# Patient Record
Sex: FEMALE | Race: WHITE | NOT HISPANIC OR LATINO | URBAN - NONMETROPOLITAN AREA
[De-identification: names, ages, dates, MRNs, and addresses within clinical notes are randomized per-mention and may not be internally consistent; named-entity substitution may affect disease eponyms.]

---

## 2017-04-18 ENCOUNTER — OUTSIDE FACILITY SERVICE (OUTPATIENT)
Dept: FAMILY MEDICINE CLINIC | Facility: CLINIC | Age: 82
End: 2017-04-18

## 2017-04-18 PROCEDURE — 99308 SBSQ NF CARE LOW MDM 20: CPT | Performed by: FAMILY MEDICINE

## 2017-04-25 ENCOUNTER — OUTSIDE FACILITY SERVICE (OUTPATIENT)
Dept: FAMILY MEDICINE CLINIC | Facility: CLINIC | Age: 82
End: 2017-04-25

## 2017-04-25 PROCEDURE — 99308 SBSQ NF CARE LOW MDM 20: CPT | Performed by: FAMILY MEDICINE

## 2017-05-02 ENCOUNTER — OUTSIDE FACILITY SERVICE (OUTPATIENT)
Dept: FAMILY MEDICINE CLINIC | Facility: CLINIC | Age: 82
End: 2017-05-02

## 2017-05-02 PROCEDURE — 99309 SBSQ NF CARE MODERATE MDM 30: CPT | Performed by: FAMILY MEDICINE

## 2017-05-09 ENCOUNTER — OUTSIDE FACILITY SERVICE (OUTPATIENT)
Dept: FAMILY MEDICINE CLINIC | Facility: CLINIC | Age: 82
End: 2017-05-09

## 2017-05-09 PROCEDURE — 99308 SBSQ NF CARE LOW MDM 20: CPT | Performed by: FAMILY MEDICINE

## 2017-05-17 ENCOUNTER — OUTSIDE FACILITY SERVICE (OUTPATIENT)
Dept: FAMILY MEDICINE CLINIC | Facility: CLINIC | Age: 82
End: 2017-05-17

## 2017-05-17 PROCEDURE — 99308 SBSQ NF CARE LOW MDM 20: CPT | Performed by: FAMILY MEDICINE

## 2017-05-24 ENCOUNTER — OUTSIDE FACILITY SERVICE (OUTPATIENT)
Dept: FAMILY MEDICINE CLINIC | Facility: CLINIC | Age: 82
End: 2017-05-24

## 2017-05-24 PROCEDURE — 99308 SBSQ NF CARE LOW MDM 20: CPT | Performed by: FAMILY MEDICINE

## 2017-06-06 ENCOUNTER — OUTSIDE FACILITY SERVICE (OUTPATIENT)
Dept: FAMILY MEDICINE CLINIC | Facility: CLINIC | Age: 82
End: 2017-06-06

## 2017-06-06 PROCEDURE — 99308 SBSQ NF CARE LOW MDM 20: CPT | Performed by: FAMILY MEDICINE

## 2017-06-13 ENCOUNTER — OUTSIDE FACILITY SERVICE (OUTPATIENT)
Dept: FAMILY MEDICINE CLINIC | Facility: CLINIC | Age: 82
End: 2017-06-13

## 2017-06-13 PROCEDURE — 99308 SBSQ NF CARE LOW MDM 20: CPT | Performed by: FAMILY MEDICINE

## 2017-06-21 ENCOUNTER — OUTSIDE FACILITY SERVICE (OUTPATIENT)
Dept: FAMILY MEDICINE CLINIC | Facility: CLINIC | Age: 82
End: 2017-06-21

## 2017-06-21 PROCEDURE — 99308 SBSQ NF CARE LOW MDM 20: CPT | Performed by: FAMILY MEDICINE

## 2017-06-28 ENCOUNTER — OUTSIDE FACILITY SERVICE (OUTPATIENT)
Dept: FAMILY MEDICINE CLINIC | Facility: CLINIC | Age: 82
End: 2017-06-28

## 2017-06-28 PROCEDURE — 99308 SBSQ NF CARE LOW MDM 20: CPT | Performed by: FAMILY MEDICINE

## 2017-07-05 ENCOUNTER — OUTSIDE FACILITY SERVICE (OUTPATIENT)
Dept: FAMILY MEDICINE CLINIC | Facility: CLINIC | Age: 82
End: 2017-07-05

## 2017-07-05 PROCEDURE — 99308 SBSQ NF CARE LOW MDM 20: CPT | Performed by: FAMILY MEDICINE

## 2017-07-28 ENCOUNTER — OUTSIDE FACILITY SERVICE (OUTPATIENT)
Dept: FAMILY MEDICINE CLINIC | Facility: CLINIC | Age: 82
End: 2017-07-28

## 2017-07-28 PROCEDURE — 99308 SBSQ NF CARE LOW MDM 20: CPT | Performed by: NURSE PRACTITIONER

## 2017-08-08 ENCOUNTER — OUTSIDE FACILITY SERVICE (OUTPATIENT)
Dept: FAMILY MEDICINE CLINIC | Facility: CLINIC | Age: 82
End: 2017-08-08

## 2017-08-08 PROCEDURE — 99309 SBSQ NF CARE MODERATE MDM 30: CPT | Performed by: FAMILY MEDICINE

## 2017-08-17 ENCOUNTER — OUTSIDE FACILITY SERVICE (OUTPATIENT)
Dept: FAMILY MEDICINE CLINIC | Facility: CLINIC | Age: 82
End: 2017-08-17

## 2017-08-17 PROCEDURE — 99308 SBSQ NF CARE LOW MDM 20: CPT | Performed by: NURSE PRACTITIONER

## 2017-09-08 ENCOUNTER — OUTSIDE FACILITY SERVICE (OUTPATIENT)
Dept: FAMILY MEDICINE CLINIC | Facility: CLINIC | Age: 82
End: 2017-09-08

## 2017-09-08 PROCEDURE — 99308 SBSQ NF CARE LOW MDM 20: CPT | Performed by: NURSE PRACTITIONER

## 2017-09-22 ENCOUNTER — OUTSIDE FACILITY SERVICE (OUTPATIENT)
Dept: FAMILY MEDICINE CLINIC | Facility: CLINIC | Age: 82
End: 2017-09-22

## 2017-09-22 PROCEDURE — 99308 SBSQ NF CARE LOW MDM 20: CPT | Performed by: NURSE PRACTITIONER

## 2017-09-27 ENCOUNTER — OUTSIDE FACILITY SERVICE (OUTPATIENT)
Dept: FAMILY MEDICINE CLINIC | Facility: CLINIC | Age: 82
End: 2017-09-27

## 2017-09-27 PROCEDURE — 99309 SBSQ NF CARE MODERATE MDM 30: CPT | Performed by: FAMILY MEDICINE

## 2017-10-10 ENCOUNTER — OUTSIDE FACILITY SERVICE (OUTPATIENT)
Dept: FAMILY MEDICINE CLINIC | Facility: CLINIC | Age: 82
End: 2017-10-10

## 2017-10-10 PROCEDURE — 99309 SBSQ NF CARE MODERATE MDM 30: CPT | Performed by: FAMILY MEDICINE

## 2017-10-13 ENCOUNTER — OUTSIDE FACILITY SERVICE (OUTPATIENT)
Dept: FAMILY MEDICINE CLINIC | Facility: CLINIC | Age: 82
End: 2017-10-13

## 2017-10-13 PROCEDURE — 99308 SBSQ NF CARE LOW MDM 20: CPT | Performed by: NURSE PRACTITIONER

## 2017-10-24 ENCOUNTER — OUTSIDE FACILITY SERVICE (OUTPATIENT)
Dept: FAMILY MEDICINE CLINIC | Facility: CLINIC | Age: 82
End: 2017-10-24

## 2017-10-24 PROCEDURE — 99309 SBSQ NF CARE MODERATE MDM 30: CPT | Performed by: FAMILY MEDICINE

## 2017-11-03 ENCOUNTER — OUTSIDE FACILITY SERVICE (OUTPATIENT)
Dept: FAMILY MEDICINE CLINIC | Facility: CLINIC | Age: 82
End: 2017-11-03

## 2017-11-03 PROCEDURE — 99308 SBSQ NF CARE LOW MDM 20: CPT | Performed by: NURSE PRACTITIONER

## 2017-11-07 ENCOUNTER — OUTSIDE FACILITY SERVICE (OUTPATIENT)
Dept: FAMILY MEDICINE CLINIC | Facility: CLINIC | Age: 82
End: 2017-11-07

## 2017-11-07 PROCEDURE — 99309 SBSQ NF CARE MODERATE MDM 30: CPT | Performed by: FAMILY MEDICINE

## 2017-11-20 ENCOUNTER — OUTSIDE FACILITY SERVICE (OUTPATIENT)
Dept: FAMILY MEDICINE CLINIC | Facility: CLINIC | Age: 82
End: 2017-11-20

## 2017-11-20 PROCEDURE — 99309 SBSQ NF CARE MODERATE MDM 30: CPT | Performed by: FAMILY MEDICINE

## 2017-12-05 ENCOUNTER — OUTSIDE FACILITY SERVICE (OUTPATIENT)
Dept: FAMILY MEDICINE CLINIC | Facility: CLINIC | Age: 82
End: 2017-12-05

## 2017-12-05 PROCEDURE — 99309 SBSQ NF CARE MODERATE MDM 30: CPT | Performed by: FAMILY MEDICINE

## 2017-12-15 ENCOUNTER — OUTSIDE FACILITY SERVICE (OUTPATIENT)
Dept: FAMILY MEDICINE CLINIC | Facility: CLINIC | Age: 82
End: 2017-12-15

## 2017-12-15 PROCEDURE — 99308 SBSQ NF CARE LOW MDM 20: CPT | Performed by: NURSE PRACTITIONER

## 2017-12-19 ENCOUNTER — OUTSIDE FACILITY SERVICE (OUTPATIENT)
Dept: FAMILY MEDICINE CLINIC | Facility: CLINIC | Age: 82
End: 2017-12-19

## 2017-12-19 PROCEDURE — 99309 SBSQ NF CARE MODERATE MDM 30: CPT | Performed by: FAMILY MEDICINE

## 2018-01-04 ENCOUNTER — OUTSIDE FACILITY SERVICE (OUTPATIENT)
Dept: INTERNAL MEDICINE | Facility: CLINIC | Age: 83
End: 2018-01-04

## 2018-01-04 PROCEDURE — 99309 SBSQ NF CARE MODERATE MDM 30: CPT | Performed by: FAMILY MEDICINE

## 2018-01-12 ENCOUNTER — OUTSIDE FACILITY SERVICE (OUTPATIENT)
Dept: FAMILY MEDICINE CLINIC | Facility: CLINIC | Age: 83
End: 2018-01-12

## 2018-01-12 PROCEDURE — 99308 SBSQ NF CARE LOW MDM 20: CPT | Performed by: NURSE PRACTITIONER

## 2018-01-17 ENCOUNTER — OUTSIDE FACILITY SERVICE (OUTPATIENT)
Dept: INTERNAL MEDICINE | Facility: CLINIC | Age: 83
End: 2018-01-17

## 2018-01-17 PROCEDURE — 99309 SBSQ NF CARE MODERATE MDM 30: CPT | Performed by: FAMILY MEDICINE

## 2018-01-31 ENCOUNTER — OUTSIDE FACILITY SERVICE (OUTPATIENT)
Dept: INTERNAL MEDICINE | Facility: CLINIC | Age: 83
End: 2018-01-31

## 2018-01-31 PROCEDURE — 99309 SBSQ NF CARE MODERATE MDM 30: CPT | Performed by: FAMILY MEDICINE

## 2018-02-09 ENCOUNTER — OUTSIDE FACILITY SERVICE (OUTPATIENT)
Dept: FAMILY MEDICINE CLINIC | Facility: CLINIC | Age: 83
End: 2018-02-09

## 2018-02-09 PROCEDURE — 99308 SBSQ NF CARE LOW MDM 20: CPT | Performed by: NURSE PRACTITIONER

## 2018-02-14 ENCOUNTER — OUTSIDE FACILITY SERVICE (OUTPATIENT)
Dept: INTERNAL MEDICINE | Facility: CLINIC | Age: 83
End: 2018-02-14

## 2018-02-14 PROCEDURE — 99309 SBSQ NF CARE MODERATE MDM 30: CPT | Performed by: FAMILY MEDICINE

## 2018-02-27 ENCOUNTER — OUTSIDE FACILITY SERVICE (OUTPATIENT)
Dept: INTERNAL MEDICINE | Facility: CLINIC | Age: 83
End: 2018-02-27

## 2018-02-27 PROCEDURE — 99309 SBSQ NF CARE MODERATE MDM 30: CPT | Performed by: FAMILY MEDICINE

## 2018-03-23 ENCOUNTER — OUTSIDE FACILITY SERVICE (OUTPATIENT)
Dept: FAMILY MEDICINE CLINIC | Facility: CLINIC | Age: 83
End: 2018-03-23

## 2018-03-23 PROCEDURE — 99308 SBSQ NF CARE LOW MDM 20: CPT | Performed by: NURSE PRACTITIONER

## 2018-03-28 ENCOUNTER — OUTSIDE FACILITY SERVICE (OUTPATIENT)
Dept: INTERNAL MEDICINE | Facility: CLINIC | Age: 83
End: 2018-03-28

## 2018-03-28 PROCEDURE — 99309 SBSQ NF CARE MODERATE MDM 30: CPT | Performed by: FAMILY MEDICINE

## 2018-04-10 ENCOUNTER — OUTSIDE FACILITY SERVICE (OUTPATIENT)
Dept: INTERNAL MEDICINE | Facility: CLINIC | Age: 83
End: 2018-04-10

## 2018-04-10 PROCEDURE — 99309 SBSQ NF CARE MODERATE MDM 30: CPT | Performed by: FAMILY MEDICINE

## 2018-04-13 ENCOUNTER — OUTSIDE FACILITY SERVICE (OUTPATIENT)
Dept: FAMILY MEDICINE CLINIC | Facility: CLINIC | Age: 83
End: 2018-04-13

## 2018-04-13 PROCEDURE — 99308 SBSQ NF CARE LOW MDM 20: CPT | Performed by: NURSE PRACTITIONER

## 2018-04-18 ENCOUNTER — OUTSIDE FACILITY SERVICE (OUTPATIENT)
Dept: INTERNAL MEDICINE | Facility: CLINIC | Age: 83
End: 2018-04-18

## 2018-04-18 PROCEDURE — 99309 SBSQ NF CARE MODERATE MDM 30: CPT | Performed by: FAMILY MEDICINE

## 2018-04-24 ENCOUNTER — OUTSIDE FACILITY SERVICE (OUTPATIENT)
Dept: INTERNAL MEDICINE | Facility: CLINIC | Age: 83
End: 2018-04-24

## 2018-04-24 PROCEDURE — 99309 SBSQ NF CARE MODERATE MDM 30: CPT | Performed by: FAMILY MEDICINE

## 2018-05-18 ENCOUNTER — OUTSIDE FACILITY SERVICE (OUTPATIENT)
Dept: FAMILY MEDICINE CLINIC | Facility: CLINIC | Age: 83
End: 2018-05-18

## 2018-05-18 PROCEDURE — 99308 SBSQ NF CARE LOW MDM 20: CPT | Performed by: NURSE PRACTITIONER

## 2018-06-04 RX ORDER — METHYLPHENIDATE HYDROCHLORIDE 5 MG/1
5 TABLET ORAL DAILY
Qty: 60 TABLET | Refills: 0 | Status: SHIPPED | OUTPATIENT
Start: 2018-06-04 | End: 2018-08-06 | Stop reason: SDUPTHER

## 2018-06-29 ENCOUNTER — OUTSIDE FACILITY SERVICE (OUTPATIENT)
Dept: FAMILY MEDICINE CLINIC | Facility: CLINIC | Age: 83
End: 2018-06-29

## 2018-06-29 PROCEDURE — 99308 SBSQ NF CARE LOW MDM 20: CPT | Performed by: NURSE PRACTITIONER

## 2018-07-09 ENCOUNTER — NURSING HOME (OUTPATIENT)
Dept: FAMILY MEDICINE CLINIC | Facility: CLINIC | Age: 83
End: 2018-07-09

## 2018-07-09 VITALS
HEART RATE: 70 BPM | SYSTOLIC BLOOD PRESSURE: 136 MMHG | DIASTOLIC BLOOD PRESSURE: 68 MMHG | WEIGHT: 138 LBS | OXYGEN SATURATION: 96 % | RESPIRATION RATE: 16 BRPM | TEMPERATURE: 98.4 F

## 2018-07-09 DIAGNOSIS — K21.9 GERD WITHOUT ESOPHAGITIS: Chronic | ICD-10-CM

## 2018-07-09 DIAGNOSIS — I10 ESSENTIAL HYPERTENSION: Chronic | ICD-10-CM

## 2018-07-09 DIAGNOSIS — Z79.4 TYPE 2 DIABETES MELLITUS TREATED WITH INSULIN (HCC): Chronic | ICD-10-CM

## 2018-07-09 DIAGNOSIS — J96.11 CHRONIC RESPIRATORY FAILURE WITH HYPOXIA (HCC): Chronic | ICD-10-CM

## 2018-07-09 DIAGNOSIS — Z78.9 IMPAIRED MOBILITY AND ADLS: Chronic | ICD-10-CM

## 2018-07-09 DIAGNOSIS — E11.9 TYPE 2 DIABETES MELLITUS TREATED WITH INSULIN (HCC): Chronic | ICD-10-CM

## 2018-07-09 DIAGNOSIS — F01.50 VASCULAR DEMENTIA WITHOUT BEHAVIORAL DISTURBANCE (HCC): Chronic | ICD-10-CM

## 2018-07-09 DIAGNOSIS — Z74.09 IMPAIRED MOBILITY AND ADLS: Chronic | ICD-10-CM

## 2018-07-09 DIAGNOSIS — R54 AGE-RELATED PHYSICAL DEBILITY: Primary | Chronic | ICD-10-CM

## 2018-07-09 PROCEDURE — 99309 SBSQ NF CARE MODERATE MDM 30: CPT | Performed by: FAMILY MEDICINE

## 2018-07-10 PROBLEM — I10 ESSENTIAL HYPERTENSION: Chronic | Status: ACTIVE | Noted: 2018-07-10

## 2018-07-10 PROBLEM — J96.11 CHRONIC RESPIRATORY FAILURE WITH HYPOXIA (HCC): Chronic | Status: ACTIVE | Noted: 2018-07-10

## 2018-07-10 PROBLEM — K21.9 GERD WITHOUT ESOPHAGITIS: Chronic | Status: ACTIVE | Noted: 2018-07-10

## 2018-07-10 PROBLEM — E11.9 TYPE 2 DIABETES MELLITUS TREATED WITH INSULIN (HCC): Chronic | Status: ACTIVE | Noted: 2018-07-10

## 2018-07-10 PROBLEM — Z78.9 IMPAIRED MOBILITY AND ADLS: Chronic | Status: ACTIVE | Noted: 2018-07-10

## 2018-07-10 PROBLEM — Z79.4 TYPE 2 DIABETES MELLITUS TREATED WITH INSULIN (HCC): Chronic | Status: ACTIVE | Noted: 2018-07-10

## 2018-07-10 PROBLEM — R54 AGE-RELATED PHYSICAL DEBILITY: Chronic | Status: ACTIVE | Noted: 2018-07-10

## 2018-07-10 PROBLEM — Z74.09 IMPAIRED MOBILITY AND ADLS: Chronic | Status: ACTIVE | Noted: 2018-07-10

## 2018-07-10 PROBLEM — F01.50 VASCULAR DEMENTIA WITHOUT BEHAVIORAL DISTURBANCE (HCC): Chronic | Status: ACTIVE | Noted: 2018-07-10

## 2018-07-10 NOTE — PROGRESS NOTES
[facility]  Skilled Nursing Facility    Patient Name: Brigette Ramirez    : 10/26/1930     DOS: 2018    Nursing Facility: []   [place]  []     []   [facility]  []   [facility]    Subjective     Chief Complaint:  CMM/LTC    History of Present Illness:   [x]  Follow Up visit for coordination of long term care issues and chronic medical management needs.    Dementia/debility/chronic respiratory failure with hypoxia/impaired mobility and ADLs/diabetes mellitus/GERD      Review of Systems:  [x]  A complete ROS was performed. All were (-).  []  A complete ROS was performed. All were (-) except:     Review of Systems    1. Constitutional:  2. HENT:  3. Eyes:  4. Respiratory:  5. Cardiovascular:  6. Gastrointestinal:  7. Endocrine:  8. Genitourinary:  9. Musculoskeletal:  10. Skin:  11. Neurological:  12. Hematological:  13. Psychiatric/Behavioral:    Past Medical History: No past medical history on file. Reviewed at facility    Past Surgical History:No past surgical history on file. Reviewed at facility    Family History: family history is not on file. Reviewed at facility    Social History:   Reviewed at facility    Allergies:  Allergies not on file Reviewed at facility, no new listings     [x]  History reviewed at skilled nursing facility.    Objective     Vital Signs:                                              LPM: ____  Weight: _____    Physical Exam:  General:   [x]  Alert   [x]  Oriented x person  [x]  No acute distress    [x]  Confused  []  Disoriented   []  Comatose      HEENT:   [x] Atraumatic  [x]  PEERLA  [x]  Oral mucosa pink and moist                Neck:      [x]  Nares patent without epistaxis  [x]  External auditory canals are patent     [x]  Tympanic membranes intact      [x]  Supple [x]  No JVD [x]  Trachea midline [x]  No thyromegaly      Psych:  [x]  Mood _________  [x]  No acute changes []  Depressed     Heart::   [x]  RRR  []  IRRR  []  Murmur ___________     Pedal Pulses:     [x]  Present  []  Absent      Lungs:   [x]   CTA  [x]  Unlabored breathing effort []  Rales, Location ___________       Abdomen:    [x]   Soft, BS x 4, NT/ND  []  Distended  []  Tender __________       Skin:  [x]  Intact, warm and dry  []  Pressure Ulcer, Location  Age related atrophy of the skin     Extremities:               Neuro:     [x]  No clubbing [x]  No cyanosis  [x]  No edema    [x]  Good ROM actively and passively [x] Symmetric muscle strength and                                                                 development     []  Edema, Location []  Pitting    [x]  Normal speech []  Normal mental status [x] Cranial nerves II through                                                                                XII intact   [x]  No anosmia [x]  DTR 2+ [x]  Proprioception intact    [x]  No focal motor/sensory deficits  Poor core strength and stability       Pain:  []  None  [x]  Pain noted w/pain management in place       Urinary:                []  Continent  [x]  Incontinent  []  Retention  []  F/C     []  UTI w/treatment in progress              Appetite:  []  Fair  [x]  Good  []  Poor  [x]  Weight Stable     []  Weightloss  []  Unavoidable Weight Loss     []  Supplements Provided  []  Tolerating Tube Feeding              Assessment / Plan     Assessment/Problem List:    Patient Active Problem List   Diagnosis   • Vascular dementia without behavioral disturbance   • Impaired mobility and ADLs   • Age-related physical debility   • Type 2 diabetes mellitus treated with insulin (CMS/[facility])   • Essential hypertension   • GERD without esophagitis   • Chronic respiratory failure with hypoxia (CMS/[facility])       Plan:  Planned continuation of supportive care.  No significant increased work of breathing or increased oxygen demand.  Blood sugars have been adequately controlled.  Vital signs otherwise stable.  No acute behavioral changes.  Blood pressure is at goal.      [x] Medication Review: I have reviewed the  patients active and prn medications at Skilled Nursing Facility      []   PT/OT Reviewed   []  Order Changes     [x] I have personally reviewed and interpreted available lab data, radiology studies and ECG obtained at time of visit.       []   Discharge Plans Reviewed      [x]  Plan of Care Reviewed  []  Discharge Summary Reviewed      [x]  Discussed Patient in detail with nursing/staff, addressed all needs today.    Discussed plan of care in detail with pt today; pt verb understanding and agrees; counseled for approx 15 min of total 25 min total exam time.    Jose D Pennington,  07/10/18 5:16 PM    EMR Dragon/Transcription disclaimer:   Much of this encounter note is an electronic transcription/translation of spoken language to printed text. The electronic translation of spoken language may permit erroneous, or at times, nonsensical words or phrases to be inadvertently transcribed; Although I have reviewed the note for such errors, some may still exist.

## 2018-07-17 ENCOUNTER — NURSING HOME (OUTPATIENT)
Dept: FAMILY MEDICINE CLINIC | Facility: CLINIC | Age: 83
End: 2018-07-17

## 2018-07-17 DIAGNOSIS — J96.11 CHRONIC RESPIRATORY FAILURE WITH HYPOXIA (HCC): Chronic | ICD-10-CM

## 2018-07-17 DIAGNOSIS — R54 AGE-RELATED PHYSICAL DEBILITY: Chronic | ICD-10-CM

## 2018-07-17 DIAGNOSIS — Z74.09 IMPAIRED MOBILITY AND ADLS: Primary | Chronic | ICD-10-CM

## 2018-07-17 DIAGNOSIS — F01.50 VASCULAR DEMENTIA WITHOUT BEHAVIORAL DISTURBANCE (HCC): Chronic | ICD-10-CM

## 2018-07-17 DIAGNOSIS — Z78.9 IMPAIRED MOBILITY AND ADLS: Primary | Chronic | ICD-10-CM

## 2018-07-17 DIAGNOSIS — K21.9 GERD WITHOUT ESOPHAGITIS: Chronic | ICD-10-CM

## 2018-07-17 DIAGNOSIS — Z79.4 TYPE 2 DIABETES MELLITUS TREATED WITH INSULIN (HCC): Chronic | ICD-10-CM

## 2018-07-17 DIAGNOSIS — E11.9 TYPE 2 DIABETES MELLITUS TREATED WITH INSULIN (HCC): Chronic | ICD-10-CM

## 2018-07-17 DIAGNOSIS — I10 ESSENTIAL HYPERTENSION: Chronic | ICD-10-CM

## 2018-07-17 PROCEDURE — 99309 SBSQ NF CARE MODERATE MDM 30: CPT | Performed by: FAMILY MEDICINE

## 2018-07-19 NOTE — PROGRESS NOTES
Nursing Home Progress Note      Patient Name: Brigette Ramirez   YOB: 1930    Date of Service: 07/17/2018      Facility: [place] []   [place] []   [facility] [x]   [facility] []   Other []       CHIEF COMPLAINT:  CMM/LTC     HISTORY OF PRESENT ILLNESS:  [x]  Follow Up visit for coordination of long term care issues and chronic medical management needs.    Vascular dementia/diabetes mellitus/GERD/chronic respiratory failure with hypoxia/impaired mobility and ADLs/ ypertension/age related physcal ebility    PAST MEDICAL & SURGICAL HISTORY:  No past medical history on file.   No past surgical history on file.     MEDICATIONS:  Medication administration record for Brigette Ramirez reviewed and reconciled today.    ALLERGIES:  Allergies not on file     REVIEW OF SYSTEMS:  • Appetite: Fair []   Good [x]   Poor []   Weight Loss []  [x]  Weight Stable   Unavoidable Weight Loss []  Tolerating Tube Feeding []    Supplements Provided []   • Constitutional: Negative for fever, chills, diaphoresis or fatigue and weight change.   • HENT: No dysphagia; no new changes to vision/hearing/smell/taste; no epistaxis  • Eyes: Negative for redness and visual disturbance.   • Respiratory: Negative for shortness of breath, chest pain, cough or chest tightness.   • Cardiovascular: Negative for chest pain and palpitations.   • Gastrointestinal: Negative for abdominal distention, abdominal pain and blood in stool.   • Endocrine: Negative for cold intolerance and heat intolerance.   • Genitourinary: Negative for difficulty urinating, dysuria and frequency.Negative for hematuria   • Musculoskeletal: Chronic myalgias and arthralgias  • Integumentary: No open wounds, rash or concerning skin lesions  • Neurological: Negative for syncope, weakness and headaches.   • Hematological: Negative for adenopathy. Does not bruise/bleed easily.   • Immunological: Negative for reported allergies or immunological disorders  • Psychological: No depression,  anxiety or suicidal ideation    PHYSICAL EXAMINATION:  VITAL SIGNS: /70, HR 70 bpm, RR 18, weight 138, O2 sat 96% on 3 L NC    General Appearance:  [x]  Alert   [x]  Oriented x person  [x]  No acute distress    [x]  Confused  []  Disoriented   []  Comatose   Head:  Atraumatic and normocephalic, without obvious abnormality.   Eyes:          PERRLA, conjunctivae and sclerae normal, no Icterus. No pallor. Extra-occular movements are within normal limits.   Ears:  Ears appear intact with no abnormalities noted.   Throat: No oral lesions, no thrush, oral mucosa moist.   Neck: Supple, trachea midline, no thyromegaly, no carotid bruit.   Back:   No kyphoscoliosis. No tenderness to palpation.   Lungs:   Chest shape is normal.  Audible air exchange noted all lung fields.  No wheezing.  Rhonchus, referred upper congestion that is cleared with light cough.     Heart:  Normal S1 and S2, no murmur, no gallop, no rub. No JVD.   Abdomen:   Normal bowel sounds, no masses, no organomegaly. Soft, non-tender, non-distended, no guarding    Extremities: Chronic frail stature, edema, cyanosis or clubbing.  Frail build.    Pulses: Pulses palpable and equal bilaterally.   Skin: No bleeding or rash.  Generalized dry skin noted.  Age-related atrophy of skin.   Neurologic: [x] Normal speech []  Normal mental status    [x] Cranial nerves II through XII intact   [x]  No anosmia [x]  DTR 2+ [x]  Proprioception intact  [x]  No focal motor/sensory deficits     Psych/Mood:        [x]  No acute changes []  Depressed  Urinary:        []  Continent  [x]  Incontinent  []  Retention  []  F/C     []  UTI w/treatment in progress  RECORD REVIEW:   • Consult notes []   • Admission and subsequent notes []   •  [x] I have personally reviewed and interpreted available lab data, radiology studies and ECG obtained at time of visit.  [x] Medication Review: I have reviewed the patients active and prn medications at Skilled Nursing Facility     ASSESSMENT    Diagnoses and all orders for this visit:    Impaired mobility and ADLs    Age-related physical debility    Type 2 diabetes mellitus treated with insulin (CMS/[facility])    Chronic respiratory failure with hypoxia (CMS/[facility])    Essential hypertension    GERD without esophagitis    Vascular dementia without behavioral disturbance    •     PLAN  Continue supportive care as needed for ADLs and mobilization/transfers.  No signs of increased work of breathing or oxygen supplementation need at this time.  Blood pressures well controlled.  No reports of any cyclical or persistent hypoglycemia.  Continue dietary last modifications to assist in blood sugar control.  No acute behavioral changes.  Routine surveillance labs as needed.    [x]  Discussed Patient in detail with nursing/staff, addressed all needs today.     [x]  Plan of Care Reviewed   []   PT/OT Reviewed   []  Order Changes  []   Discharge Plans Reviewed         Total face to face time spent with patient 25 minutes, 15 min in counseling.    Jose D Pennington DO.  7/19/2018

## 2018-07-23 ENCOUNTER — NURSING HOME (OUTPATIENT)
Dept: FAMILY MEDICINE CLINIC | Facility: CLINIC | Age: 83
End: 2018-07-23

## 2018-07-23 DIAGNOSIS — Z74.09 IMPAIRED MOBILITY AND ADLS: Chronic | ICD-10-CM

## 2018-07-23 DIAGNOSIS — J96.11 CHRONIC RESPIRATORY FAILURE WITH HYPOXIA (HCC): Primary | Chronic | ICD-10-CM

## 2018-07-23 DIAGNOSIS — E11.9 TYPE 2 DIABETES MELLITUS TREATED WITH INSULIN (HCC): Chronic | ICD-10-CM

## 2018-07-23 DIAGNOSIS — I10 ESSENTIAL HYPERTENSION: Chronic | ICD-10-CM

## 2018-07-23 DIAGNOSIS — R54 AGE-RELATED PHYSICAL DEBILITY: Chronic | ICD-10-CM

## 2018-07-23 DIAGNOSIS — K21.9 GERD WITHOUT ESOPHAGITIS: Chronic | ICD-10-CM

## 2018-07-23 DIAGNOSIS — Z78.9 IMPAIRED MOBILITY AND ADLS: Chronic | ICD-10-CM

## 2018-07-23 DIAGNOSIS — F01.50 VASCULAR DEMENTIA WITHOUT BEHAVIORAL DISTURBANCE (HCC): Chronic | ICD-10-CM

## 2018-07-23 DIAGNOSIS — Z79.4 TYPE 2 DIABETES MELLITUS TREATED WITH INSULIN (HCC): Chronic | ICD-10-CM

## 2018-07-23 PROCEDURE — 99309 SBSQ NF CARE MODERATE MDM 30: CPT | Performed by: FAMILY MEDICINE

## 2018-07-31 ENCOUNTER — NURSING HOME (OUTPATIENT)
Dept: FAMILY MEDICINE CLINIC | Facility: CLINIC | Age: 83
End: 2018-07-31

## 2018-07-31 DIAGNOSIS — J96.11 CHRONIC RESPIRATORY FAILURE WITH HYPOXIA (HCC): Primary | Chronic | ICD-10-CM

## 2018-07-31 DIAGNOSIS — F01.50 VASCULAR DEMENTIA WITHOUT BEHAVIORAL DISTURBANCE (HCC): Chronic | ICD-10-CM

## 2018-07-31 DIAGNOSIS — E11.9 TYPE 2 DIABETES MELLITUS TREATED WITH INSULIN (HCC): Chronic | ICD-10-CM

## 2018-07-31 DIAGNOSIS — Z78.9 IMPAIRED MOBILITY AND ADLS: Chronic | ICD-10-CM

## 2018-07-31 DIAGNOSIS — Z79.4 TYPE 2 DIABETES MELLITUS TREATED WITH INSULIN (HCC): Chronic | ICD-10-CM

## 2018-07-31 DIAGNOSIS — K21.9 GERD WITHOUT ESOPHAGITIS: Chronic | ICD-10-CM

## 2018-07-31 DIAGNOSIS — R54 AGE-RELATED PHYSICAL DEBILITY: Chronic | ICD-10-CM

## 2018-07-31 DIAGNOSIS — Z74.09 IMPAIRED MOBILITY AND ADLS: Chronic | ICD-10-CM

## 2018-07-31 DIAGNOSIS — I10 ESSENTIAL HYPERTENSION: Chronic | ICD-10-CM

## 2018-07-31 PROCEDURE — 99309 SBSQ NF CARE MODERATE MDM 30: CPT | Performed by: FAMILY MEDICINE

## 2018-08-06 RX ORDER — METHYLPHENIDATE HYDROCHLORIDE 5 MG/1
5 TABLET ORAL DAILY
Qty: 60 TABLET | Refills: 0 | Status: SHIPPED | OUTPATIENT
Start: 2018-08-06 | End: 2018-10-15 | Stop reason: SDUPTHER

## 2018-08-08 ENCOUNTER — NURSING HOME (OUTPATIENT)
Dept: FAMILY MEDICINE CLINIC | Facility: CLINIC | Age: 83
End: 2018-08-08

## 2018-08-08 DIAGNOSIS — K21.9 GERD WITHOUT ESOPHAGITIS: Chronic | ICD-10-CM

## 2018-08-08 DIAGNOSIS — F01.50 VASCULAR DEMENTIA WITHOUT BEHAVIORAL DISTURBANCE (HCC): Primary | Chronic | ICD-10-CM

## 2018-08-08 DIAGNOSIS — I10 ESSENTIAL HYPERTENSION: Chronic | ICD-10-CM

## 2018-08-08 DIAGNOSIS — Z74.09 IMPAIRED MOBILITY AND ADLS: Chronic | ICD-10-CM

## 2018-08-08 DIAGNOSIS — Z78.9 IMPAIRED MOBILITY AND ADLS: Chronic | ICD-10-CM

## 2018-08-08 DIAGNOSIS — E11.9 TYPE 2 DIABETES MELLITUS TREATED WITH INSULIN (HCC): Chronic | ICD-10-CM

## 2018-08-08 DIAGNOSIS — R54 AGE-RELATED PHYSICAL DEBILITY: Chronic | ICD-10-CM

## 2018-08-08 DIAGNOSIS — J96.11 CHRONIC RESPIRATORY FAILURE WITH HYPOXIA (HCC): Chronic | ICD-10-CM

## 2018-08-08 DIAGNOSIS — Z79.4 TYPE 2 DIABETES MELLITUS TREATED WITH INSULIN (HCC): Chronic | ICD-10-CM

## 2018-08-08 PROCEDURE — 99309 SBSQ NF CARE MODERATE MDM 30: CPT | Performed by: FAMILY MEDICINE

## 2018-08-13 ENCOUNTER — LAB REQUISITION (OUTPATIENT)
Dept: LAB | Facility: HOSPITAL | Age: 83
End: 2018-08-13

## 2018-08-13 DIAGNOSIS — Z00.00 ROUTINE GENERAL MEDICAL EXAMINATION AT A HEALTH CARE FACILITY: ICD-10-CM

## 2018-08-13 LAB
ANION GAP SERPL CALCULATED.3IONS-SCNC: 3 MMOL/L (ref 3–11)
BUN BLD-MCNC: 16 MG/DL (ref 9–23)
BUN/CREAT SERPL: 23.9 (ref 7–25)
CALCIUM SPEC-SCNC: 9.2 MG/DL (ref 8.7–10.4)
CHLORIDE SERPL-SCNC: 92 MMOL/L (ref 99–109)
CO2 SERPL-SCNC: 36 MMOL/L (ref 20–31)
CREAT BLD-MCNC: 0.67 MG/DL (ref 0.6–1.3)
GFR SERPL CREATININE-BSD FRML MDRD: 83 ML/MIN/1.73
GLUCOSE BLD-MCNC: 292 MG/DL (ref 70–100)
POTASSIUM BLD-SCNC: 4.2 MMOL/L (ref 3.5–5.5)
SODIUM BLD-SCNC: 131 MMOL/L (ref 132–146)

## 2018-08-13 PROCEDURE — 80048 BASIC METABOLIC PNL TOTAL CA: CPT

## 2018-08-14 NOTE — PROGRESS NOTES
Nursing Home Progress Note      Patient Name: Brigette Ramirez   YOB: 1930    Date of Service: 07/23/2018      Facility: [place] []   [place] []   [facility] [x]   [facility] []   Other []       CHIEF COMPLAINT:  CMM/LTC     HISTORY OF PRESENT ILLNESS:  [x]  Follow Up visit for coordination of long term care issues and chronic medical management needs.    Vascular dementia/diabetes mellitus/GERD/chronic respiratory failure with hypoxia/impaired mobility and ADLs/ ypertension/age related physcal ebility    PAST MEDICAL & SURGICAL HISTORY:  No past medical history on file.   No past surgical history on file.     MEDICATIONS:  Medication administration record for Brigette Ramirez reviewed and reconciled today.    ALLERGIES:  Allergies not on file     REVIEW OF SYSTEMS:  • Appetite: Fair []   Good [x]   Poor []   Weight Loss []  [x]  Weight Stable   Unavoidable Weight Loss []  Tolerating Tube Feeding []    Supplements Provided []   • Constitutional: Negative for fever, chills, diaphoresis or fatigue and weight change.   • HENT: No dysphagia; no new changes to vision/hearing/smell/taste; no epistaxis  • Eyes: Negative for redness and visual disturbance.   • Respiratory: Negative for shortness of breath, chest pain, cough or chest tightness.   • Cardiovascular: Negative for chest pain and palpitations.   • Gastrointestinal: Negative for abdominal distention, abdominal pain and blood in stool.   • Endocrine: Negative for cold intolerance and heat intolerance.   • Genitourinary: Negative for difficulty urinating, dysuria and frequency.Negative for hematuria   • Musculoskeletal: Chronic myalgias and arthralgias  • Integumentary: No open wounds, rash or concerning skin lesions  • Neurological: Negative for syncope, weakness and headaches.   • Hematological: Negative for adenopathy. Does not bruise/bleed easily.   • Immunological: Negative for reported allergies or immunological disorders  • Psychological: No depression,  anxiety or suicidal ideation    PHYSICAL EXAMINATION:  VITAL SIGNS: /72, HR 71 BPM, RR 18 O2 sat 96% on 3 L NC, weight 138    General Appearance:  [x]  Alert   [x]  Oriented x person  [x]  No acute distress    [x]  Confused  []  Disoriented   []  Comatose   Head:  Atraumatic and normocephalic, without obvious abnormality.   Eyes:          PERRLA, conjunctivae and sclerae normal, no Icterus. No pallor. Extra-occular movements are within normal limits.   Ears:  Ears appear intact with no abnormalities noted.   Throat: No oral lesions, no thrush, oral mucosa moist.   Neck: Supple, trachea midline, no thyromegaly, no carotid bruit.   Back:   No kyphoscoliosis. No tenderness to palpation.   Lungs:   Chest shape is normal.  Audible air exchange noted all lung fields.  No wheezing.  Rhonchus, referred upper congestion that is cleared with light cough.     Heart:  Normal S1 and S2, no murmur, no gallop, no rub. No JVD.   Abdomen:   Normal bowel sounds, no masses, no organomegaly. Soft, non-tender, non-distended, no guarding    Extremities: Chronic frail stature, edema, cyanosis or clubbing.  Frail build.    Pulses: Pulses palpable and equal bilaterally.   Skin: No bleeding or rash.  Generalized dry skin noted.  Age-related atrophy of skin.   Neurologic: [x] Normal speech []  Normal mental status    [x] Cranial nerves II through XII intact   [x]  No anosmia [x]  DTR 2+ [x]  Proprioception intact  [x]  No focal motor/sensory deficits     Psych/Mood:        [x]  No acute changes []  Depressed  Urinary:        []  Continent  [x]  Incontinent  []  Retention  []  F/C     []  UTI w/treatment in progress  RECORD REVIEW:   • Consult notes []   • Admission and subsequent notes []   •  [x] I have personally reviewed and interpreted available lab data, radiology studies and ECG obtained at time of visit.  [x] Medication Review: I have reviewed the patients active and prn medications at [facility]     ASSESSMENT    Diagnoses and all orders for this visit:    Chronic respiratory failure with hypoxia (CMS/HCC)    Vascular dementia without behavioral disturbance    Impaired mobility and ADLs    Age-related physical debility    Type 2 diabetes mellitus treated with insulin (CMS/[facility])    GERD without esophagitis    Essential hypertension        PLAN  Continue supportive care as needed for ADLs and mobilization/transfers.  Without signs of increased work of breathing or oxygen supplementation need at this time.  Blood pressure well controlled on review of vital signs, without lability.  No reports of any cyclical/persistent hypoglycemia.  Continue dietary/lifestyle modifications to assist in blood sugar control.  No acute behavioral changes.  Routine surveillance labs.    [x]  Discussed Patient in detail with nursing/staff, addressed all needs today.     [x]  Plan of Care Reviewed   []   PT/OT Reviewed   []  Order Changes  []   Discharge Plans Reviewed         Total face to face time spent with patient 25 minutes, 15 min in counseling.    Jose D Pennington DO.  8/14/2018

## 2018-08-15 ENCOUNTER — NURSING HOME (OUTPATIENT)
Dept: FAMILY MEDICINE CLINIC | Facility: CLINIC | Age: 83
End: 2018-08-15

## 2018-08-15 DIAGNOSIS — Z79.4 TYPE 2 DIABETES MELLITUS TREATED WITH INSULIN (HCC): Chronic | ICD-10-CM

## 2018-08-15 DIAGNOSIS — J96.11 CHRONIC RESPIRATORY FAILURE WITH HYPOXIA (HCC): Primary | Chronic | ICD-10-CM

## 2018-08-15 DIAGNOSIS — R54 AGE-RELATED PHYSICAL DEBILITY: Chronic | ICD-10-CM

## 2018-08-15 DIAGNOSIS — Z74.09 IMPAIRED MOBILITY AND ADLS: Chronic | ICD-10-CM

## 2018-08-15 DIAGNOSIS — I10 ESSENTIAL HYPERTENSION: Chronic | ICD-10-CM

## 2018-08-15 DIAGNOSIS — Z78.9 IMPAIRED MOBILITY AND ADLS: Chronic | ICD-10-CM

## 2018-08-15 DIAGNOSIS — E11.9 TYPE 2 DIABETES MELLITUS TREATED WITH INSULIN (HCC): Chronic | ICD-10-CM

## 2018-08-15 DIAGNOSIS — F01.50 VASCULAR DEMENTIA WITHOUT BEHAVIORAL DISTURBANCE (HCC): Chronic | ICD-10-CM

## 2018-08-15 DIAGNOSIS — K21.9 GERD WITHOUT ESOPHAGITIS: Chronic | ICD-10-CM

## 2018-08-15 PROCEDURE — 99309 SBSQ NF CARE MODERATE MDM 30: CPT | Performed by: FAMILY MEDICINE

## 2018-08-22 ENCOUNTER — NURSING HOME (OUTPATIENT)
Dept: FAMILY MEDICINE CLINIC | Facility: CLINIC | Age: 83
End: 2018-08-22

## 2018-08-22 DIAGNOSIS — Z79.4 TYPE 2 DIABETES MELLITUS TREATED WITH INSULIN (HCC): Chronic | ICD-10-CM

## 2018-08-22 DIAGNOSIS — I10 ESSENTIAL HYPERTENSION: Chronic | ICD-10-CM

## 2018-08-22 DIAGNOSIS — J96.11 CHRONIC RESPIRATORY FAILURE WITH HYPOXIA (HCC): Chronic | ICD-10-CM

## 2018-08-22 DIAGNOSIS — R30.0 DYSURIA: ICD-10-CM

## 2018-08-22 DIAGNOSIS — K21.9 GERD WITHOUT ESOPHAGITIS: Chronic | ICD-10-CM

## 2018-08-22 DIAGNOSIS — E11.9 TYPE 2 DIABETES MELLITUS TREATED WITH INSULIN (HCC): Chronic | ICD-10-CM

## 2018-08-22 DIAGNOSIS — Z78.9 IMPAIRED MOBILITY AND ADLS: Chronic | ICD-10-CM

## 2018-08-22 DIAGNOSIS — N39.0 RECURRENT UTI: ICD-10-CM

## 2018-08-22 DIAGNOSIS — F01.50 VASCULAR DEMENTIA WITHOUT BEHAVIORAL DISTURBANCE (HCC): Primary | Chronic | ICD-10-CM

## 2018-08-22 DIAGNOSIS — Z74.09 IMPAIRED MOBILITY AND ADLS: Chronic | ICD-10-CM

## 2018-08-22 DIAGNOSIS — R54 AGE-RELATED PHYSICAL DEBILITY: Chronic | ICD-10-CM

## 2018-08-22 PROCEDURE — 99309 SBSQ NF CARE MODERATE MDM 30: CPT | Performed by: FAMILY MEDICINE

## 2018-08-24 ENCOUNTER — OUTSIDE FACILITY SERVICE (OUTPATIENT)
Dept: FAMILY MEDICINE CLINIC | Facility: CLINIC | Age: 83
End: 2018-08-24

## 2018-08-24 PROCEDURE — 99308 SBSQ NF CARE LOW MDM 20: CPT | Performed by: NURSE PRACTITIONER

## 2018-08-29 ENCOUNTER — NURSING HOME (OUTPATIENT)
Dept: FAMILY MEDICINE CLINIC | Facility: CLINIC | Age: 83
End: 2018-08-29

## 2018-08-29 DIAGNOSIS — K21.9 GERD WITHOUT ESOPHAGITIS: Chronic | ICD-10-CM

## 2018-08-29 DIAGNOSIS — Z74.09 IMPAIRED MOBILITY AND ADLS: Chronic | ICD-10-CM

## 2018-08-29 DIAGNOSIS — E11.9 TYPE 2 DIABETES MELLITUS TREATED WITH INSULIN (HCC): Chronic | ICD-10-CM

## 2018-08-29 DIAGNOSIS — F01.50 VASCULAR DEMENTIA WITHOUT BEHAVIORAL DISTURBANCE (HCC): Chronic | ICD-10-CM

## 2018-08-29 DIAGNOSIS — J96.11 CHRONIC RESPIRATORY FAILURE WITH HYPOXIA (HCC): Primary | Chronic | ICD-10-CM

## 2018-08-29 DIAGNOSIS — Z79.4 TYPE 2 DIABETES MELLITUS TREATED WITH INSULIN (HCC): Chronic | ICD-10-CM

## 2018-08-29 DIAGNOSIS — I10 ESSENTIAL HYPERTENSION: Chronic | ICD-10-CM

## 2018-08-29 DIAGNOSIS — R54 AGE-RELATED PHYSICAL DEBILITY: Chronic | ICD-10-CM

## 2018-08-29 DIAGNOSIS — Z78.9 IMPAIRED MOBILITY AND ADLS: Chronic | ICD-10-CM

## 2018-08-29 PROCEDURE — 99309 SBSQ NF CARE MODERATE MDM 30: CPT | Performed by: FAMILY MEDICINE

## 2018-09-05 ENCOUNTER — NURSING HOME (OUTPATIENT)
Dept: FAMILY MEDICINE CLINIC | Facility: CLINIC | Age: 83
End: 2018-09-05

## 2018-09-05 DIAGNOSIS — K21.9 GERD WITHOUT ESOPHAGITIS: Chronic | ICD-10-CM

## 2018-09-05 DIAGNOSIS — I10 ESSENTIAL HYPERTENSION: Chronic | ICD-10-CM

## 2018-09-05 DIAGNOSIS — Z74.09 IMPAIRED MOBILITY AND ADLS: Chronic | ICD-10-CM

## 2018-09-05 DIAGNOSIS — R54 AGE-RELATED PHYSICAL DEBILITY: Chronic | ICD-10-CM

## 2018-09-05 DIAGNOSIS — F01.50 VASCULAR DEMENTIA WITHOUT BEHAVIORAL DISTURBANCE (HCC): Chronic | ICD-10-CM

## 2018-09-05 DIAGNOSIS — E11.9 TYPE 2 DIABETES MELLITUS TREATED WITH INSULIN (HCC): Chronic | ICD-10-CM

## 2018-09-05 DIAGNOSIS — Z79.4 TYPE 2 DIABETES MELLITUS TREATED WITH INSULIN (HCC): Chronic | ICD-10-CM

## 2018-09-05 DIAGNOSIS — Z78.9 IMPAIRED MOBILITY AND ADLS: Chronic | ICD-10-CM

## 2018-09-05 DIAGNOSIS — J96.11 CHRONIC RESPIRATORY FAILURE WITH HYPOXIA (HCC): Primary | Chronic | ICD-10-CM

## 2018-09-05 PROCEDURE — 99309 SBSQ NF CARE MODERATE MDM 30: CPT | Performed by: FAMILY MEDICINE

## 2018-09-05 NOTE — PROGRESS NOTES
Nursing Home Progress Note      Patient Name: Brigette Ramirez   YOB: 1930    Date of Service: 07/31/2018      Facility: [place] []   [place] []   [facility] [x]   [facility] []   Other []       CHIEF COMPLAINT:  CMM/LTC     HISTORY OF PRESENT ILLNESS:  [x]  Follow Up visit for coordination of long term care issues and chronic medical management needs.    Vascular dementia/diabetes mellitus/GERD/chronic respiratory failure with hypoxia/impaired mobility and ADLs/ ypertension/age related physcal ebility    PAST MEDICAL & SURGICAL HISTORY:  No past medical history on file.   No past surgical history on file.     MEDICATIONS:  Medication administration record for Brigette Ramirez reviewed and reconciled today.    ALLERGIES:  Allergies not on file     REVIEW OF SYSTEMS:  • Appetite: Fair []   Good [x]   Poor []   Weight Loss []  [x]  Weight Stable   Unavoidable Weight Loss []  Tolerating Tube Feeding []    Supplements Provided []   • Constitutional: Negative for fever, chills, diaphoresis or fatigue and weight change.   • HENT: No dysphagia; no new changes to vision/hearing/smell/taste; no epistaxis  • Eyes: Negative for redness and visual disturbance.   • Respiratory: Negative for shortness of breath, chest pain, cough or chest tightness.   • Cardiovascular: Negative for chest pain and palpitations.   • Gastrointestinal: Negative for abdominal distention, abdominal pain and blood in stool.   • Endocrine: Negative for cold intolerance and heat intolerance.   • Genitourinary: Negative for difficulty urinating, dysuria and frequency.Negative for hematuria   • Musculoskeletal: Chronic myalgias and arthralgias  • Integumentary: No open wounds, rash or concerning skin lesions  • Neurological: Negative for syncope, weakness and headaches.   • Hematological: Negative for adenopathy. Does not bruise/bleed easily.   • Immunological: Negative for reported allergies or immunological disorders  • Psychological: No depression,  anxiety or suicidal ideation    PHYSICAL EXAMINATION:  VITAL SIGNS: /68, HR 72 BPM, RR 18 O2 sat 96% on 3 L NC, weight 138    General Appearance:  [x]  Alert   [x]  Oriented x person  [x]  No acute distress    [x]  Confused  []  Disoriented   []  Comatose   Head:  Atraumatic and normocephalic, without obvious abnormality.   Eyes:          PERRLA, conjunctivae and sclerae normal, no Icterus. No pallor. Extra-occular movements are within normal limits.   Ears:  Ears appear intact with no abnormalities noted.   Throat: No oral lesions, no thrush, oral mucosa moist.   Neck: Supple, trachea midline, no thyromegaly, no carotid bruit.   Back:   No kyphoscoliosis. No tenderness to palpation.   Lungs:   Chest shape is normal.  Audible air exchange noted all lung fields.  No wheezing.  Rhonchus, referred upper congestion that is cleared with light cough.     Heart:  Normal S1 and S2, no murmur, no gallop, no rub. No JVD.   Abdomen:   Normal bowel sounds, no masses, no organomegaly. Soft, non-tender, non-distended, no guarding    Extremities: Chronic frail stature, edema, cyanosis or clubbing.  Frail build.    Pulses: Pulses palpable and equal bilaterally.   Skin: No bleeding or rash.  Generalized dry skin noted.  Age-related atrophy of skin.   Neurologic: [x] Normal speech []  Normal mental status    [x] Cranial nerves II through XII intact   [x]  No anosmia [x]  DTR 2+ [x]  Proprioception intact  [x]  No focal motor/sensory deficits     Psych/Mood:        [x]  No acute changes []  Depressed  Urinary:        []  Continent  [x]  Incontinent  []  Retention  []  F/C     []  UTI w/treatment in progress  RECORD REVIEW:   • Consult notes []   • Admission and subsequent notes []   •  [x] I have personally reviewed and interpreted available lab data, radiology studies and ECG obtained at time of visit.  [x] Medication Review: I have reviewed the patients active and prn medications at [facility]     ASSESSMENT    Diagnoses and all orders for this visit:    Chronic respiratory failure with hypoxia (CMS/HCC)    Vascular dementia without behavioral disturbance    Impaired mobility and ADLs    Age-related physical debility    Type 2 diabetes mellitus treated with insulin (CMS/[facility])    GERD without esophagitis    Essential hypertension        PLAN  Continue supportive care as needed for ADLs and mobilization/transfers.  Without signs of increased work of breathing or oxygen supplementation need at this time.  Blood pressure well controlled on review of vitals, without lability.  No reports of any cyclical/persistent hypoglycemia.  Continue dietary/lifestyle modifications to assist in blood sugar control.  No acute behavioral changes.  Continue surveillance labs.    [x]  Discussed Patient in detail with nursing/staff, addressed all needs today.     [x]  Plan of Care Reviewed   []   PT/OT Reviewed   []  Order Changes  []   Discharge Plans Reviewed         Total face to face time spent with patient 25 minutes, 15 min in counseling.    Jose D Pennington DO.  7/31/2018

## 2018-09-12 ENCOUNTER — NURSING HOME (OUTPATIENT)
Dept: FAMILY MEDICINE CLINIC | Facility: CLINIC | Age: 83
End: 2018-09-12

## 2018-09-12 DIAGNOSIS — J96.11 CHRONIC RESPIRATORY FAILURE WITH HYPOXIA (HCC): Chronic | ICD-10-CM

## 2018-09-12 DIAGNOSIS — I10 ESSENTIAL HYPERTENSION: Chronic | ICD-10-CM

## 2018-09-12 DIAGNOSIS — Z78.9 IMPAIRED MOBILITY AND ADLS: Primary | Chronic | ICD-10-CM

## 2018-09-12 DIAGNOSIS — K21.9 GERD WITHOUT ESOPHAGITIS: Chronic | ICD-10-CM

## 2018-09-12 DIAGNOSIS — Z74.09 IMPAIRED MOBILITY AND ADLS: Primary | Chronic | ICD-10-CM

## 2018-09-12 DIAGNOSIS — E11.9 TYPE 2 DIABETES MELLITUS TREATED WITH INSULIN (HCC): Chronic | ICD-10-CM

## 2018-09-12 DIAGNOSIS — R54 AGE-RELATED PHYSICAL DEBILITY: Chronic | ICD-10-CM

## 2018-09-12 DIAGNOSIS — Z79.4 TYPE 2 DIABETES MELLITUS TREATED WITH INSULIN (HCC): Chronic | ICD-10-CM

## 2018-09-12 DIAGNOSIS — F01.50 VASCULAR DEMENTIA WITHOUT BEHAVIORAL DISTURBANCE (HCC): Chronic | ICD-10-CM

## 2018-09-12 PROCEDURE — 99309 SBSQ NF CARE MODERATE MDM 30: CPT | Performed by: FAMILY MEDICINE

## 2018-09-19 ENCOUNTER — NURSING HOME (OUTPATIENT)
Dept: FAMILY MEDICINE CLINIC | Facility: CLINIC | Age: 83
End: 2018-09-19

## 2018-09-19 DIAGNOSIS — Z74.09 IMPAIRED MOBILITY AND ADLS: Primary | Chronic | ICD-10-CM

## 2018-09-19 DIAGNOSIS — Z79.4 TYPE 2 DIABETES MELLITUS TREATED WITH INSULIN (HCC): Chronic | ICD-10-CM

## 2018-09-19 DIAGNOSIS — R54 AGE-RELATED PHYSICAL DEBILITY: Chronic | ICD-10-CM

## 2018-09-19 DIAGNOSIS — F01.50 VASCULAR DEMENTIA WITHOUT BEHAVIORAL DISTURBANCE (HCC): Chronic | ICD-10-CM

## 2018-09-19 DIAGNOSIS — J96.11 CHRONIC RESPIRATORY FAILURE WITH HYPOXIA (HCC): Chronic | ICD-10-CM

## 2018-09-19 DIAGNOSIS — K21.9 GERD WITHOUT ESOPHAGITIS: Chronic | ICD-10-CM

## 2018-09-19 DIAGNOSIS — I10 ESSENTIAL HYPERTENSION: Chronic | ICD-10-CM

## 2018-09-19 DIAGNOSIS — Z78.9 IMPAIRED MOBILITY AND ADLS: Primary | Chronic | ICD-10-CM

## 2018-09-19 DIAGNOSIS — E11.9 TYPE 2 DIABETES MELLITUS TREATED WITH INSULIN (HCC): Chronic | ICD-10-CM

## 2018-09-19 PROCEDURE — 99309 SBSQ NF CARE MODERATE MDM 30: CPT | Performed by: FAMILY MEDICINE

## 2018-09-21 ENCOUNTER — OUTSIDE FACILITY SERVICE (OUTPATIENT)
Dept: FAMILY MEDICINE CLINIC | Facility: CLINIC | Age: 83
End: 2018-09-21

## 2018-09-21 PROCEDURE — 99308 SBSQ NF CARE LOW MDM 20: CPT | Performed by: NURSE PRACTITIONER

## 2018-09-26 ENCOUNTER — NURSING HOME (OUTPATIENT)
Dept: FAMILY MEDICINE CLINIC | Facility: CLINIC | Age: 83
End: 2018-09-26

## 2018-09-26 DIAGNOSIS — K21.9 GERD WITHOUT ESOPHAGITIS: Chronic | ICD-10-CM

## 2018-09-26 DIAGNOSIS — Z78.9 IMPAIRED MOBILITY AND ADLS: Chronic | ICD-10-CM

## 2018-09-26 DIAGNOSIS — E11.9 TYPE 2 DIABETES MELLITUS TREATED WITH INSULIN (HCC): Primary | Chronic | ICD-10-CM

## 2018-09-26 DIAGNOSIS — F01.50 VASCULAR DEMENTIA WITHOUT BEHAVIORAL DISTURBANCE (HCC): Chronic | ICD-10-CM

## 2018-09-26 DIAGNOSIS — I10 ESSENTIAL HYPERTENSION: Chronic | ICD-10-CM

## 2018-09-26 DIAGNOSIS — J96.11 CHRONIC RESPIRATORY FAILURE WITH HYPOXIA (HCC): Chronic | ICD-10-CM

## 2018-09-26 DIAGNOSIS — Z79.4 TYPE 2 DIABETES MELLITUS TREATED WITH INSULIN (HCC): Primary | Chronic | ICD-10-CM

## 2018-09-26 DIAGNOSIS — R54 AGE-RELATED PHYSICAL DEBILITY: Chronic | ICD-10-CM

## 2018-09-26 DIAGNOSIS — Z74.09 IMPAIRED MOBILITY AND ADLS: Chronic | ICD-10-CM

## 2018-09-26 PROCEDURE — 99309 SBSQ NF CARE MODERATE MDM 30: CPT | Performed by: FAMILY MEDICINE

## 2018-09-26 NOTE — PROGRESS NOTES
Nursing Home Progress Note      Patient Name: Brigette Ramirez   YOB: 1930    Date of Service: 8/8/2018      Facility: [place] []   [place] []   [facility] [x]   [facility] []   Other []       CHIEF COMPLAINT:  CMM/LTC     HISTORY OF PRESENT ILLNESS:  [x]  Follow Up visit for coordination of long term care issues and chronic medical management needs.    Vascular dementia/diabetes mellitus/GERD/chronic respiratory failure with hypoxia/impaired mobility and ADLs/ ypertension/age related physcal ebility    PAST MEDICAL & SURGICAL HISTORY:  No past medical history on file.   No past surgical history on file.     MEDICATIONS:  Medication administration record for Brigette Ramirez reviewed and reconciled today.    ALLERGIES:  Allergies not on file     REVIEW OF SYSTEMS:  • Appetite: Fair []   Good [x]   Poor []   Weight Loss []  [x]  Weight Stable   Unavoidable Weight Loss []  Tolerating Tube Feeding []    Supplements Provided []   • Constitutional: Negative for fever, chills, diaphoresis or fatigue and weight change.   • HENT: No dysphagia; no new changes to vision/hearing/smell/taste; no epistaxis  • Eyes: Negative for redness and visual disturbance.   • Respiratory: Negative for shortness of breath, chest pain, cough or chest tightness.   • Cardiovascular: Negative for chest pain and palpitations.   • Gastrointestinal: Negative for abdominal distention, abdominal pain and blood in stool.   • Endocrine: Negative for cold intolerance and heat intolerance.   • Genitourinary: Negative for difficulty urinating, dysuria and frequency.Negative for hematuria   • Musculoskeletal: Chronic myalgias and arthralgias  • Integumentary: No open wounds, rash or concerning skin lesions  • Neurological: Negative for syncope, weakness and headaches.   • Hematological: Negative for adenopathy. Does not bruise/bleed easily.   • Immunological: Negative for reported allergies or immunological disorders  • Psychological: No depression, anxiety  or suicidal ideation    PHYSICAL EXAMINATION:  VITAL SIGNS: /74, HR 71 BPM, RR 18, weight 138, FiO2 support 3 L nasal cannula    General Appearance:  [x]  Alert   [x]  Oriented x person  [x]  No acute distress    [x]  Confused  []  Disoriented   []  Comatose   Head:  Atraumatic and normocephalic, without obvious abnormality.   Eyes:          PERRLA, conjunctivae and sclerae normal, no Icterus. No pallor. Extra-occular movements are within normal limits.   Ears:  Ears appear intact with no abnormalities noted.   Throat: No oral lesions, no thrush, oral mucosa moist.   Neck: Supple, trachea midline, no thyromegaly, no carotid bruit.   Back:   No kyphoscoliosis. No tenderness to palpation.   Lungs:   Chest shape is normal.  Audible air exchange noted all lung fields.  No wheezing.  Rhonchus, referred upper congestion that is cleared with light cough.     Heart:  Normal S1 and S2, no murmur, no gallop, no rub. No JVD.   Abdomen:   Normal bowel sounds, no masses, no organomegaly. Soft, non-tender, non-distended, no guarding    Extremities: Chronic frail stature, edema, cyanosis or clubbing.  Frail build.    Pulses: Pulses palpable and equal bilaterally.   Skin: No bleeding or rash.  Generalized dry skin noted.  Age-related atrophy of skin.   Neurologic: [x] Normal speech []  Normal mental status    [x] Cranial nerves II through XII intact   [x]  No anosmia [x]  DTR 2+ [x]  Proprioception intact  [x]  No focal motor/sensory deficits     Psych/Mood:        [x]  No acute changes []  Depressed  Urinary:        []  Continent  [x]  Incontinent  []  Retention  []  F/C     []  UTI w/treatment in progress  RECORD REVIEW:   • Consult notes []   • Admission and subsequent notes []   •  [x] I have personally reviewed and interpreted available lab data, radiology studies and ECG obtained at time of visit.  [x] Medication Review: I have reviewed the patients active and prn medications at [facility]     ASSESSMENT    Diagnoses and all orders for this visit:    Vascular dementia without behavioral disturbance    Impaired mobility and ADLs    Age-related physical debility    GERD without esophagitis    Type 2 diabetes mellitus treated with insulin (CMS/[facility])    Chronic respiratory failure with hypoxia (CMS/[facility])    Essential hypertension        PLAN  Continue supportive care as needed for ADLs and mobilization/transfers.  Without signs of increased work of breathing or oxygen supplementation need at this time.  Blood pressure well controlled on review of vitals.  No reports of any cyclical/persistent hypoglycemia.  Continue dietary/lifestyle modifications to assist in blood sugar control.  No acute behavioral changes.  Continue surveillance labs.  Weight stability noted.  No reports of fever or chills.    [x]  Discussed Patient in detail with nursing/staff, addressed all needs today.     [x]  Plan of Care Reviewed   []   PT/OT Reviewed   []  Order Changes  []   Discharge Plans Reviewed         Total face to face time spent with patient 25 minutes, 15 min in counseling.    Jose D Pennington DO.  8/8/2018

## 2018-10-09 NOTE — PROGRESS NOTES
Nursing Home Progress Note      Patient Name: Brigette Ramirez   YOB: 1930    Date of Service: 8/15/2018      Facility: [place] []   [place] []   [facility] [x]   [place] []   Other []       CHIEF COMPLAINT:  CMM/LTC     HISTORY OF PRESENT ILLNESS:  [x]  Follow Up visit for coordination of long term care issues and chronic medical management needs.    Vascular dementia/diabetes mellitus/GERD/chronic respiratory failure with hypoxia/impaired mobility and ADLs/ ypertension/age related physcal debility    PAST MEDICAL & SURGICAL HISTORY:  No past medical history on file.   No past surgical history on file.     MEDICATIONS:  Medication administration record for Brigette Ramirez reviewed and reconciled today.    ALLERGIES:  Allergies not on file     REVIEW OF SYSTEMS:  • Appetite: Fair []   Good [x]   Poor []   Weight Loss []  [x]  Weight Stable   Unavoidable Weight Loss []  Tolerating Tube Feeding []    Supplements Provided []   • Constitutional: Negative for fever, chills, diaphoresis or fatigue and weight change.   • HENT: No dysphagia; no new changes to vision/hearing/smell/taste; no epistaxis  • Eyes: Negative for redness and visual disturbance.   • Respiratory: Negative for shortness of breath, chest pain, cough or chest tightness.   • Cardiovascular: Negative for chest pain and palpitations.   • Gastrointestinal: Negative for abdominal distention, abdominal pain and blood in stool.   • Endocrine: Negative for cold intolerance and heat intolerance.   • Genitourinary: Negative for difficulty urinating, dysuria and frequency.Negative for hematuria   • Musculoskeletal: Chronic myalgias and arthralgias  • Integumentary: No open wounds, rash or concerning skin lesions  • Neurological: Negative for syncope, weakness and headaches.   • Hematological: Negative for adenopathy. Does not bruise/bleed easily.   • Immunological: Negative for reported allergies or immunological disorders  • Psychological: No depression,  anxiety or suicidal ideation    PHYSICAL EXAMINATION:  VITAL SIGNS: /78, HR 74 BPM, RR 18, weight 132, O2 sat 94% 2 L nasal cannula    General Appearance:  [x]  Alert   [x]  Oriented x person  [x]  No acute distress    [x]  Confused  []  Disoriented   []  Comatose   Head:  Atraumatic and normocephalic, without obvious abnormality.   Eyes:          PERRLA, conjunctivae and sclerae normal, no Icterus. No pallor. Extra-occular movements are within normal limits.   Ears:  Ears appear intact with no abnormalities noted.   Throat: No oral lesions, no thrush, oral mucosa moist.   Neck: Supple, trachea midline, no thyromegaly, no carotid bruit.   Back:   No kyphoscoliosis. No tenderness to palpation.   Lungs:   Chest shape is normal.  Audible air exchange noted all lung fields.  No wheezing.  Rhonchus, referred upper congestion that is cleared with light cough.     Heart:  Normal S1 and S2, no murmur, no gallop, no rub. No JVD.   Abdomen:   Normal bowel sounds, no masses, no organomegaly. Soft, non-tender, non-distended, no guarding    Extremities: Chronic frail stature, edema, cyanosis or clubbing.  Frail build.    Pulses: Pulses palpable and equal bilaterally.   Skin: No bleeding or rash.  Generalized dry skin noted.  Age-related atrophy of skin.   Neurologic: [x] Normal speech []  Normal mental status    [x] Cranial nerves II through XII intact   [x]  No anosmia [x]  DTR 2+ [x]  Proprioception intact  [x]  No focal motor/sensory deficits     Psych/Mood:        [x]  No acute changes []  Depressed  Urinary:        []  Continent  [x]  Incontinent  []  Retention  []  F/C     []  UTI w/treatment in progress  RECORD REVIEW:   • Consult notes []   • Admission and subsequent notes []   •  [x] I have personally reviewed and interpreted available lab data, radiology studies and ECG obtained at time of visit.  [x] Medication Review: I have reviewed the patients active and prn medications at [facility]      ASSESSMENT   Diagnoses and all orders for this visit:    Chronic respiratory failure with hypoxia (CMS/HCC)    GERD without esophagitis    Type 2 diabetes mellitus treated with insulin (CMS/HCC)    Vascular dementia without behavioral disturbance    Impaired mobility and ADLs    Age-related physical debility    Essential hypertension        PLAN  Continue supportive care as needed for ADLs and mobilization/transfers.  Without signs of increased work of breathing or oxygen supplementation need at this time.  Blood pressure well controlled on review of vitals.  No reports of any cyclical/persistent hypoglycemia.  Continue dietary/lifestyle modifications to assist in blood sugar control.  No acute behavioral changes.  Continue surveillance labs.  Weight stability noted.    [x]  Discussed Patient in detail with nursing/staff, addressed all needs today.     [x]  Plan of Care Reviewed   []   PT/OT Reviewed   []  Order Changes  []   Discharge Plans Reviewed         Total face to face time spent with patient 25 minutes, 15 min in counseling.    Jose D Pennington DO.  8/15/2018

## 2018-10-11 NOTE — PROGRESS NOTES
Nursing Home Progress Note      Patient Name: Brigette Ramirez   YOB: 1930    Date of Service: 8/15/2018      Facility: [place] []   [place] []   [facility] [x]   [facility] []   Other []       CHIEF COMPLAINT:  CMM/LTC     HISTORY OF PRESENT ILLNESS:  [x]  Follow Up visit for coordination of long term care issues and chronic medical management needs.    Vascular dementia/diabetes mellitus/GERD/chronic respiratory failure with hypoxia/impaired mobility and ADLs/ ypertension/age related physcal debility    PAST MEDICAL & SURGICAL HISTORY:  No past medical history on file.   No past surgical history on file.     MEDICATIONS:  Medication administration record for Brigette Ramirez reviewed and reconciled today.    ALLERGIES:  Allergies not on file     REVIEW OF SYSTEMS:  • Appetite: Fair []   Good [x]   Poor []   Weight Loss []  [x]  Weight Stable   Unavoidable Weight Loss []  Tolerating Tube Feeding []    Supplements Provided []   • Constitutional: Negative for fever, chills, diaphoresis or fatigue and weight change.   • HENT: No dysphagia; no new changes to vision/hearing/smell/taste; no epistaxis  • Eyes: Negative for redness and visual disturbance.   • Respiratory: Negative for shortness of breath, chest pain, cough or chest tightness.   • Cardiovascular: Negative for chest pain and palpitations.   • Gastrointestinal: Negative for abdominal distention, abdominal pain and blood in stool.   • Endocrine: Negative for cold intolerance and heat intolerance.   • Genitourinary: Mild dysuria, without hematuria.Negative for hematuria   • Musculoskeletal: Chronic myalgias and arthralgias  • Integumentary: No open wounds, rash or concerning skin lesions  • Neurological: Negative for syncope, weakness and headaches.   • Hematological: Negative for adenopathy. Does not bruise/bleed easily.   • Immunological: Negative for reported allergies or immunological disorders  • Psychological: No depression, anxiety or suicidal  ideation    PHYSICAL EXAMINATION:  VITAL SIGNS: /82, HR 76 BPM, RR 18, weight 134, O2 sat 94% 2 L nasal cannula    General Appearance:  [x]  Alert   [x]  Oriented x person  [x]  No acute distress    [x]  Confused  []  Disoriented   []  Comatose   Head:  Atraumatic and normocephalic, without obvious abnormality.   Eyes:          PERRLA, conjunctivae and sclerae normal, no Icterus. No pallor. Extra-occular movements are within normal limits.   Ears:  Ears appear intact with no abnormalities noted.   Throat: No oral lesions, no thrush, oral mucosa moist.   Neck: Supple, trachea midline, no thyromegaly, no carotid bruit.   Back:   No kyphoscoliosis. No tenderness to palpation.   Lungs:   Chest shape is normal.  Audible air exchange noted all lung fields.  No wheezing.  Rhonchus, referred upper congestion that is cleared with light cough.     Heart:  Normal S1 and S2, no murmur, no gallop, no rub. No JVD.   Abdomen:   Normal bowel sounds, no masses, no organomegaly. Soft, non-tender, non-distended, no guarding .  No CVA tenderness.     Extremities: Chronic frail stature, edema, cyanosis or clubbing.  Frail build.    Pulses: Pulses palpable and equal bilaterally.   Skin: No bleeding or rash.  Generalized dry skin noted.  Age-related atrophy of skin.   Neurologic: [x] Normal speech []  Normal mental status    [x] Cranial nerves II through XII intact   [x]  No anosmia [x]  DTR 2+ [x]  Proprioception intact  [x]  No focal motor/sensory deficits     Psych/Mood:        [x]  No acute changes []  Depressed  Urinary:        []  Continent  [x]  Incontinent  []  Retention  []  F/C     []  UTI w/treatment in progress  RECORD REVIEW:   • Consult notes []   • Admission and subsequent notes []   •  [x] I have personally reviewed and interpreted available lab data, radiology studies and ECG obtained at time of visit.  [x] Medication Review: I have reviewed the patients active and prn medications at [facility]      ASSESSMENT   Diagnoses and all orders for this visit:    Vascular dementia without behavioral disturbance    Impaired mobility and ADLs    Age-related physical debility    Type 2 diabetes mellitus treated with insulin (CMS/[facility])    GERD without esophagitis    Chronic respiratory failure with hypoxia (CMS/[facility])    Essential hypertension    Dysuria    Recurrent UTI        PLAN  Continue supportive care as needed for ADLs and mobilization/transfers.  Without signs of increased work of breathing or oxygen supplementation need at this time.  Blood pressure well controlled on review of vitals.  No reports of any cyclical/persistent hypoglycemia.  Continue dietary/lifestyle modifications to assist in blood sugar control.  No acute behavioral changes.  Continue surveillance labs.  Weight stability noted.  Urinalysis will be obtained with culture and sensitivity.  Defer treatment at this time until results are known.  Patient is a known history of recurrent UTIs.  Suspect secondary to age-related changes of the bladder and resultant stasis of urine.    [x]  Discussed Patient in detail with nursing/staff, addressed all needs today.     [x]  Plan of Care Reviewed   []   PT/OT Reviewed   []  Order Changes  []   Discharge Plans Reviewed         Total face to face time spent with patient 25 minutes, 15 min in counseling.    Jose D Pennington DO.  8/15/2018

## 2018-10-15 RX ORDER — METHYLPHENIDATE HYDROCHLORIDE 5 MG/1
5 TABLET ORAL DAILY
Qty: 3 TABLET | Refills: 0 | Status: SHIPPED | OUTPATIENT
Start: 2018-10-15 | End: 2018-10-17 | Stop reason: SDUPTHER

## 2018-10-17 ENCOUNTER — NURSING HOME (OUTPATIENT)
Dept: FAMILY MEDICINE CLINIC | Facility: CLINIC | Age: 83
End: 2018-10-17

## 2018-10-17 DIAGNOSIS — F01.50 VASCULAR DEMENTIA WITHOUT BEHAVIORAL DISTURBANCE (HCC): Chronic | ICD-10-CM

## 2018-10-17 DIAGNOSIS — K21.9 GERD WITHOUT ESOPHAGITIS: Chronic | ICD-10-CM

## 2018-10-17 DIAGNOSIS — I10 ESSENTIAL HYPERTENSION: Chronic | ICD-10-CM

## 2018-10-17 DIAGNOSIS — Z74.09 IMPAIRED MOBILITY AND ADLS: Chronic | ICD-10-CM

## 2018-10-17 DIAGNOSIS — E11.9 TYPE 2 DIABETES MELLITUS TREATED WITH INSULIN (HCC): Primary | Chronic | ICD-10-CM

## 2018-10-17 DIAGNOSIS — J96.11 CHRONIC RESPIRATORY FAILURE WITH HYPOXIA (HCC): Chronic | ICD-10-CM

## 2018-10-17 DIAGNOSIS — Z78.9 IMPAIRED MOBILITY AND ADLS: Chronic | ICD-10-CM

## 2018-10-17 DIAGNOSIS — R54 AGE-RELATED PHYSICAL DEBILITY: Chronic | ICD-10-CM

## 2018-10-17 DIAGNOSIS — Z79.4 TYPE 2 DIABETES MELLITUS TREATED WITH INSULIN (HCC): Primary | Chronic | ICD-10-CM

## 2018-10-17 PROCEDURE — 99309 SBSQ NF CARE MODERATE MDM 30: CPT | Performed by: FAMILY MEDICINE

## 2018-10-17 RX ORDER — METHYLPHENIDATE HYDROCHLORIDE 5 MG/1
5 TABLET ORAL DAILY
Qty: 60 TABLET | Refills: 0 | Status: SHIPPED | OUTPATIENT
Start: 2018-10-17 | End: 2018-12-11 | Stop reason: SDUPTHER

## 2018-10-19 ENCOUNTER — NURSING HOME (OUTPATIENT)
Dept: FAMILY MEDICINE CLINIC | Facility: CLINIC | Age: 83
End: 2018-10-19

## 2018-10-19 DIAGNOSIS — Z74.09 IMPAIRED MOBILITY AND ADLS: Chronic | ICD-10-CM

## 2018-10-19 DIAGNOSIS — I10 ESSENTIAL HYPERTENSION: Chronic | ICD-10-CM

## 2018-10-19 DIAGNOSIS — F01.50 VASCULAR DEMENTIA WITHOUT BEHAVIORAL DISTURBANCE (HCC): Chronic | ICD-10-CM

## 2018-10-19 DIAGNOSIS — J96.11 CHRONIC RESPIRATORY FAILURE WITH HYPOXIA (HCC): Chronic | ICD-10-CM

## 2018-10-19 DIAGNOSIS — Z79.4 TYPE 2 DIABETES MELLITUS TREATED WITH INSULIN (HCC): Chronic | ICD-10-CM

## 2018-10-19 DIAGNOSIS — D64.9 ANEMIA, UNSPECIFIED TYPE: ICD-10-CM

## 2018-10-19 DIAGNOSIS — E11.9 TYPE 2 DIABETES MELLITUS TREATED WITH INSULIN (HCC): Chronic | ICD-10-CM

## 2018-10-19 DIAGNOSIS — Z78.9 IMPAIRED MOBILITY AND ADLS: Chronic | ICD-10-CM

## 2018-10-19 PROCEDURE — 99308 SBSQ NF CARE LOW MDM 20: CPT | Performed by: NURSE PRACTITIONER

## 2018-10-19 NOTE — PROGRESS NOTES
Nursing Home Progress Note      Patient Name: Brigette Ramirez   YOB: 1930    Date of Service: 10/19/2018      Facility: [place] []   [place] []   [facility] [x]   [facility] []   Other []       CHIEF COMPLAINT:  CMM/LTC     HISTORY OF PRESENT ILLNESS:  [x]  Follow Up visit for coordination of long term care issues and chronic medical management needs.    Vascular dementia/diabetes mellitus/GERD/chronic respiratory failure with hypoxia/impaired mobility and ADLs/ hypertension/age related physical debility    PAST MEDICAL & SURGICAL HISTORY:  No past medical history on file.   No past surgical history on file.     MEDICATIONS:  Medication administration record for Brigette Ramirez reviewed and reconciled today.    ALLERGIES:  Allergies not on file     REVIEW OF SYSTEMS:  • Appetite: Fair []   Good [x]   Poor []   Weight Loss [] Weight Stable [x]    Unavoidable Weight Loss []  Tolerating Tube Feeding []    Supplements Provided []   • Constitutional: Negative for fever, chills, diaphoresis or fatigue and weight change.   • HENT: No dysphagia; no new changes to vision/hearing/smell/taste; no epistaxis  • Eyes: Negative for redness and visual disturbance.   • Respiratory: Negative for shortness of breath, chest pain, cough or chest tightness.   • Cardiovascular: Negative for chest pain and palpitations.   • Gastrointestinal: Negative for abdominal distention, abdominal pain and blood in stool.   • Endocrine: Negative for cold intolerance and heat intolerance.   • Genitourinary: Mild dysuria, without hematuria.Negative for hematuria   • Musculoskeletal: Chronic myalgias and arthralgias  • Integumentary: No open wounds, rash or concerning skin lesions  • Neurological: Negative for syncope, weakness and headaches.   • Hematological: Negative for adenopathy. Does not bruise/bleed easily.   • Immunological: Negative for reported allergies or immunological disorders  • Psychological: No depression, anxiety or suicidal  ideation    PHYSICAL EXAMINATION:  VITAL SIGNS: /82, HR 76 BPM, RR 18, weight 134, O2 sat 94% 3 L nasal cannula    General Appearance:  [x]  Alert   [x]  Oriented x person  [x]  No acute distress    [x]  Confused  []  Disoriented   []  Comatose   Head:  Atraumatic and normocephalic, without obvious abnormality.   Eyes:          PERRLA, conjunctivae and sclerae normal, no Icterus. No pallor. Extra-occular movements are within normal limits.   Ears:  Ears appear intact with no abnormalities noted.   Throat: No oral lesions, no thrush, oral mucosa moist.   Neck: Supple, trachea midline, no thyromegaly, no carotid bruit.   Back:   No kyphoscoliosis. No tenderness to palpation.   Lungs:   Chest shape is normal.  Audible air exchange noted all lung fields.  No wheezing.  Rhonchus, referred upper congestion that is cleared with light cough.     Heart:  Normal S1 and S2, no murmur, no gallop, no rub. No JVD.   Abdomen:   Normal bowel sounds, no masses, no organomegaly. Soft, non-tender, non-distended, no guarding .  No CVA tenderness.     Extremities: Chronic frail stature, edema, cyanosis or clubbing.  Frail build.    Pulses: Pulses palpable and equal bilaterally.   Skin: No bleeding or rash.  Generalized dry skin noted.  Age-related atrophy of skin.   Neurologic: [x] Normal speech []  Normal mental status    [x] Cranial nerves II through XII intact   [x]  No anosmia [x]  DTR 2+ [x]  Proprioception intact  [x]  No focal motor/sensory deficits     Psych/Mood:        [x]  No acute changes []  Depressed  Urinary:        []  Continent  [x]  Incontinent  []  Retention  []  F/C     []  UTI w/treatment in progress  RECORD REVIEW:   • Consult notes []   • Admission and subsequent notes []   •  [x] I have personally reviewed and interpreted available lab data, radiology studies and ECG obtained at time of visit.  10/01/18 Hgb 10.3, Hct 31.9; 09/03/18 A1c 8.7%; 08/14/18 CMP was normal.  [x] Medication Review: I have reviewed  the [facility]     ASSESSMENT   Diagnoses and all orders for this visit:    Anemia, unspecified type    Type 2 diabetes mellitus treated with insulin (CMS/[facility])    Essential hypertension    Chronic respiratory failure with hypoxia (CMS/[facility])    Impaired mobility and ADLs    Vascular dementia without behavioral disturbance        PLAN  Continue supportive care as needed for ADLs and mobilization/transfers.  Without signs of increased work of breathing or oxygen supplementation need at this time.  Blood pressure well controlled on review of vitals.  No reports of any cyclical/persistent hypoglycemia.  Continue dietary/lifestyle modifications to assist in blood sugar control.  No acute behavioral changes.  Continue surveillance labs.  Weight stability noted.      [x]  Discussed Patient in detail with nursing/staff, addressed all needs today.     [x]  Plan of Care Reviewed   []   PT/OT Reviewed   []  Order Changes  []   Discharge Plans Reviewed         Total face to face time spent with patient 25 minutes, 15 min in counseling.    Shelley Adam, APRN.  10/19/2018

## 2018-10-24 ENCOUNTER — NURSING HOME (OUTPATIENT)
Dept: FAMILY MEDICINE CLINIC | Facility: CLINIC | Age: 83
End: 2018-10-24

## 2018-10-24 DIAGNOSIS — F01.50 VASCULAR DEMENTIA WITHOUT BEHAVIORAL DISTURBANCE (HCC): Chronic | ICD-10-CM

## 2018-10-24 DIAGNOSIS — E11.9 TYPE 2 DIABETES MELLITUS TREATED WITH INSULIN (HCC): Chronic | ICD-10-CM

## 2018-10-24 DIAGNOSIS — I10 ESSENTIAL HYPERTENSION: Chronic | ICD-10-CM

## 2018-10-24 DIAGNOSIS — K21.9 GERD WITHOUT ESOPHAGITIS: Chronic | ICD-10-CM

## 2018-10-24 DIAGNOSIS — J96.11 CHRONIC RESPIRATORY FAILURE WITH HYPOXIA (HCC): Chronic | ICD-10-CM

## 2018-10-24 DIAGNOSIS — Z79.4 TYPE 2 DIABETES MELLITUS TREATED WITH INSULIN (HCC): Chronic | ICD-10-CM

## 2018-10-24 DIAGNOSIS — R54 AGE-RELATED PHYSICAL DEBILITY: Primary | Chronic | ICD-10-CM

## 2018-10-24 DIAGNOSIS — Z74.09 IMPAIRED MOBILITY AND ADLS: Chronic | ICD-10-CM

## 2018-10-24 DIAGNOSIS — Z78.9 IMPAIRED MOBILITY AND ADLS: Chronic | ICD-10-CM

## 2018-10-24 PROCEDURE — 99309 SBSQ NF CARE MODERATE MDM 30: CPT | Performed by: FAMILY MEDICINE

## 2018-10-24 NOTE — PROGRESS NOTES
Nursing Home Progress Note      Patient Name: Brigette Ramirez   YOB: 1930    Date of Service: 8/29/2018      Facility: [place] []   [place] []   [facility] [x]   [facility] []   Other []       CHIEF COMPLAINT:  CMM/LTC     HISTORY OF PRESENT ILLNESS:  [x]  Follow Up visit for coordination of long term care issues and chronic medical management needs.    Vascular dementia/diabetes mellitus/GERD/chronic respiratory failure with hypoxia/impaired mobility and ADLs/ ypertension/age related physcal debility    PAST MEDICAL & SURGICAL HISTORY:  No past medical history on file.   No past surgical history on file.     MEDICATIONS:  Medication administration record for Brigette Ramirez reviewed and reconciled today.    ALLERGIES:  Allergies not on file     REVIEW OF SYSTEMS:  • Appetite: Fair []   Good [x]   Poor []   Weight Loss []  [x]  Weight Stable   Unavoidable Weight Loss []  Tolerating Tube Feeding []    Supplements Provided []   • Constitutional: Negative for fever, chills, diaphoresis or fatigue and weight change.   • HENT: No dysphagia; no new changes to vision/hearing/smell/taste; no epistaxis  • Eyes: Negative for redness and visual disturbance.   • Respiratory: Negative for shortness of breath, chest pain, cough or chest tightness.   • Cardiovascular: Negative for chest pain and palpitations.   • Gastrointestinal: Negative for abdominal distention, abdominal pain and blood in stool.   • Endocrine: Negative for cold intolerance and heat intolerance.   • Genitourinary: Mild dysuria, without hematuria.Negative for hematuria   • Musculoskeletal: Chronic myalgias and arthralgias  • Integumentary: No open wounds, rash or concerning skin lesions  • Neurological: Negative for syncope, weakness and headaches.   • Hematological: Negative for adenopathy. Does not bruise/bleed easily.   • Immunological: Negative for reported allergies or immunological disorders  • Psychological: No depression, anxiety or suicidal  ideation    PHYSICAL EXAMINATION:  VITAL SIGNS: /70, HR 64 BPM, RR 18, weight 134, O2 sat 94% 2-3 L nasal cannula    General Appearance:  [x]  Alert   [x]  Oriented x person  [x]  No acute distress    [x]  Confused  []  Disoriented   []  Comatose   Head:  Atraumatic and normocephalic, without obvious abnormality.   Eyes:          PERRLA, conjunctivae and sclerae normal, no Icterus. No pallor. Extra-occular movements are within normal limits.   Ears:  Ears appear intact with no abnormalities noted.   Throat: No oral lesions, no thrush, oral mucosa moist.   Neck: Supple, trachea midline, no thyromegaly, no carotid bruit.   Back:   No kyphoscoliosis. No tenderness to palpation.   Lungs:   Chest shape is normal.  Audible air exchange noted all lung fields.  No wheezing.  Rhonchus, referred upper congestion that is cleared with light cough.     Heart:  Normal S1 and S2, no murmur, no gallop, no rub. No JVD.   Abdomen:   Normal bowel sounds, no masses, no organomegaly. Soft, non-tender, non-distended, no guarding .  No CVA tenderness.     Extremities: Chronic frail stature, edema, cyanosis or clubbing.  Frail build.    Pulses: Pulses palpable and equal bilaterally.   Skin: No bleeding or rash.  Generalized dry skin noted.  Age-related atrophy of skin.   Neurologic: [x] Normal speech []  Normal mental status    [x] Cranial nerves II through XII intact   [x]  No anosmia [x]  DTR 2+ [x]  Proprioception intact  [x]  No focal motor/sensory deficits     Psych/Mood:        [x]  No acute changes []  Depressed  Urinary:        []  Continent  [x]  Incontinent  []  Retention  []  F/C     []  UTI w/treatment in progress  RECORD REVIEW:   • Consult notes []   • Admission and subsequent notes []   •  [x] I have personally reviewed and interpreted available lab data, radiology studies and ECG obtained at time of visit.  [x] Medication Review: I have reviewed the patients active and prn medications at [facility]      ASSESSMENT   Diagnoses and all orders for this visit:    Chronic respiratory failure with hypoxia (CMS/HCC)    Impaired mobility and ADLs    Age-related physical debility    Essential hypertension    Type 2 diabetes mellitus treated with insulin (CMS/HCC)    GERD without esophagitis    Vascular dementia without behavioral disturbance        PLAN  Continue supportive care as needed for ADLs and mobilization/transfers.  Without signs of increased work of breathing or oxygen supplementation need at this time.  Blood pressure well controlled, at goal on review of vitals.  No reports of any cyclical/persistent hypoglycemia.  Continue dietary/lifestyle modifications to assist in blood sugar control.  No acute behavioral changes.  Continue surveillance labs.  Weight stability noted.  Patient has a history of recurrent UTIs.  Suspect secondary to age-related changes of the bladder and resultant stasis of urine.    [x]  Discussed Patient in detail with nursing/staff, addressed all needs today.     [x]  Plan of Care Reviewed   []   PT/OT Reviewed   []  Order Changes  []   Discharge Plans Reviewed         Total face to face time spent with patient 25 minutes, 15 min in counseling.    Jose D Pennington DO.  8/29/2018

## 2018-10-31 ENCOUNTER — NURSING HOME (OUTPATIENT)
Dept: FAMILY MEDICINE CLINIC | Facility: CLINIC | Age: 83
End: 2018-10-31

## 2018-10-31 DIAGNOSIS — K21.9 GERD WITHOUT ESOPHAGITIS: Chronic | ICD-10-CM

## 2018-10-31 DIAGNOSIS — Z78.9 IMPAIRED MOBILITY AND ADLS: Primary | Chronic | ICD-10-CM

## 2018-10-31 DIAGNOSIS — I10 ESSENTIAL HYPERTENSION: Chronic | ICD-10-CM

## 2018-10-31 DIAGNOSIS — J96.11 CHRONIC RESPIRATORY FAILURE WITH HYPOXIA (HCC): Chronic | ICD-10-CM

## 2018-10-31 DIAGNOSIS — R54 AGE-RELATED PHYSICAL DEBILITY: Chronic | ICD-10-CM

## 2018-10-31 DIAGNOSIS — Z74.09 IMPAIRED MOBILITY AND ADLS: Primary | Chronic | ICD-10-CM

## 2018-10-31 DIAGNOSIS — J44.9 CHRONIC OBSTRUCTIVE BRONCHITIS (HCC): Chronic | ICD-10-CM

## 2018-10-31 DIAGNOSIS — F01.50 VASCULAR DEMENTIA WITHOUT BEHAVIORAL DISTURBANCE (HCC): Chronic | ICD-10-CM

## 2018-10-31 PROCEDURE — 99309 SBSQ NF CARE MODERATE MDM 30: CPT | Performed by: FAMILY MEDICINE

## 2018-11-28 ENCOUNTER — NURSING HOME (OUTPATIENT)
Dept: FAMILY MEDICINE CLINIC | Facility: CLINIC | Age: 83
End: 2018-11-28

## 2018-11-28 DIAGNOSIS — J44.9 CHRONIC OBSTRUCTIVE BRONCHITIS (HCC): Chronic | ICD-10-CM

## 2018-11-28 DIAGNOSIS — J96.11 CHRONIC RESPIRATORY FAILURE WITH HYPOXIA (HCC): Chronic | ICD-10-CM

## 2018-11-28 DIAGNOSIS — K21.9 GERD WITHOUT ESOPHAGITIS: Chronic | ICD-10-CM

## 2018-11-28 DIAGNOSIS — Z74.09 IMPAIRED MOBILITY AND ADLS: Primary | Chronic | ICD-10-CM

## 2018-11-28 DIAGNOSIS — R54 AGE-RELATED PHYSICAL DEBILITY: Chronic | ICD-10-CM

## 2018-11-28 DIAGNOSIS — E11.65 UNCONTROLLED TYPE 2 DIABETES MELLITUS WITH HYPERGLYCEMIA (HCC): ICD-10-CM

## 2018-11-28 DIAGNOSIS — F01.50 VASCULAR DEMENTIA WITHOUT BEHAVIORAL DISTURBANCE (HCC): Chronic | ICD-10-CM

## 2018-11-28 DIAGNOSIS — I10 ESSENTIAL HYPERTENSION: Chronic | ICD-10-CM

## 2018-11-28 DIAGNOSIS — Z78.9 IMPAIRED MOBILITY AND ADLS: Primary | Chronic | ICD-10-CM

## 2018-11-28 PROCEDURE — 99309 SBSQ NF CARE MODERATE MDM 30: CPT | Performed by: FAMILY MEDICINE

## 2018-12-06 NOTE — PROGRESS NOTES
Nursing Home Progress Note      Patient Name: Brigette Ramirez   YOB: 1930    Date of Service: 9/12/2018      Facility: [place] []   [place] []   [facility] [x]   [facility] []   Other []       CHIEF COMPLAINT:  CMM/LTC     HISTORY OF PRESENT ILLNESS:  [x]  Follow Up visit for coordination of long term care issues and chronic medical management needs.    Vascular dementia/diabetes mellitus/GERD/chronic respiratory failure with hypoxia/impaired mobility and ADLs/ ypertension/age related physcal debility    PAST MEDICAL & SURGICAL HISTORY:  No past medical history on file.   No past surgical history on file.     MEDICATIONS:  Medication administration record for Brigette Ramirez reviewed and reconciled today.    ALLERGIES:  Allergies not on file     REVIEW OF SYSTEMS:  • Appetite: Fair []   Good [x]   Poor []   Weight Loss []  [x]  Weight Stable   Unavoidable Weight Loss []  Tolerating Tube Feeding []    Supplements Provided []   • Constitutional: Negative for fever, chills, diaphoresis or fatigue and weight change.   • HENT: No dysphagia; no new changes to vision/hearing/smell/taste; no epistaxis  • Eyes: Negative for redness and visual disturbance.   • Respiratory: Negative for shortness of breath, chest pain, cough or chest tightness.   • Cardiovascular: Negative for chest pain and palpitations.   • Gastrointestinal: Negative for abdominal distention, abdominal pain and blood in stool.   • Endocrine: Negative for cold intolerance and heat intolerance.   • Genitourinary: Mild dysuria, without hematuria.Negative for hematuria   • Musculoskeletal: Chronic myalgias and arthralgias  • Integumentary: No open wounds, rash or concerning skin lesions  • Neurological: Negative for syncope, weakness and headaches.   • Hematological: Negative for adenopathy. Does not bruise/bleed easily.   • Immunological: Negative for reported allergies or immunological disorders  • Psychological: No depression, anxiety or suicidal  ideation    PHYSICAL EXAMINATION:  VITAL SIGNS: /65, HR 72 bpm, RR 18, weight 133    General Appearance:  [x]  Alert   [x]  Oriented x person  [x]  No acute distress    [x]  Confused  []  Disoriented   []  Comatose   Head:  Atraumatic and normocephalic, without obvious abnormality.   Eyes:          PERRLA, conjunctivae and sclerae normal, no Icterus. No pallor. Extra-occular movements are within normal limits.   Ears:  Ears appear intact with no abnormalities noted.   Throat: No oral lesions, no thrush, oral mucosa moist.   Neck: Supple, trachea midline, no thyromegaly, no carotid bruit.   Back:   No kyphoscoliosis. No tenderness to palpation.   Lungs:   Chest shape is normal.  Audible air exchange noted all lung fields.  No wheezing.  Rhonchus, referred upper congestion that is cleared with light cough.     Heart:  Normal S1 and S2, no murmur, no gallop, no rub. No JVD.   Abdomen:   Normal bowel sounds, no masses, no organomegaly. Soft, non-tender, non-distended, no guarding .  No CVA tenderness.     Extremities: Chronic frail stature, edema, cyanosis or clubbing.  Frail build.    Pulses: Pulses palpable and equal bilaterally.   Skin: No bleeding or rash.  Generalized dry skin noted.  Age-related atrophy of skin.   Neurologic: [x] Normal speech []  Normal mental status    [x] Cranial nerves II through XII intact   [x]  No anosmia [x]  DTR 2+ [x]  Proprioception intact  [x]  No focal motor/sensory deficits     Psych/Mood:        [x]  No acute changes []  Depressed  Urinary:        []  Continent  [x]  Incontinent  []  Retention  []  F/C     []  UTI w/treatment in progress  RECORD REVIEW:   • Consult notes []   • Admission and subsequent notes []   •  [x] I have personally reviewed and interpreted available lab data, radiology studies and ECG obtained at time of visit.  [x] Medication Review: I have reviewed the patients active and prn medications at [facility]     ASSESSMENT   Diagnoses and all  orders for this visit:    Impaired mobility and ADLs    Age-related physical debility    Essential hypertension    Chronic respiratory failure with hypoxia (CMS/HCC)    GERD without esophagitis    Type 2 diabetes mellitus treated with insulin (CMS/[facility])    Vascular dementia without behavioral disturbance        PLAN  Continue supportive care as needed for ADLs and mobilization/transfers.  Without signs of increased work of breathing or oxygen supplementation need at time of my exam, nor reported from nursing.  Blood pressure well controlled, at goal and patient is asymptomatic.  No reports of any cyclical/persistent hypoglycemia.  Continue dietary/lifestyle modifications to assist in blood sugar control, no reports of cyclical/persistent hypoglycemia.  No acute behavioral changes.  Continue surveillance labs when needed.  Weight stability noted, no changes.     [x]  Discussed Patient in detail with nursing/staff, addressed all needs today.     [x]  Plan of Care Reviewed   []   PT/OT Reviewed   []  Order Changes  []   Discharge Plans Reviewed         Total face to face time spent with patient 25 minutes, 15 min in counseling.    Jose D Pennington DO.  9/12/2018

## 2018-12-11 RX ORDER — METHYLPHENIDATE HYDROCHLORIDE 5 MG/1
TABLET ORAL
Qty: 60 TABLET | Refills: 0 | Status: SHIPPED | OUTPATIENT
Start: 2018-12-11 | End: 2019-01-18 | Stop reason: SDUPTHER

## 2018-12-19 NOTE — PROGRESS NOTES
Nursing Home Progress Note      Patient Name: Brigette Ramirez   YOB: 1930    Date of Service: 9/19/2018      Facility: [place] []   [place] []   [facility] [x]   [facility] []   Other []       CHIEF COMPLAINT:  CMM/LTC     HISTORY OF PRESENT ILLNESS:  [x]  Follow Up visit for coordination of long term care issues and chronic medical management needs.    Vascular dementia/diabetes mellitus/GERD/chronic respiratory failure with hypoxia/impaired mobility and ADLs/ ypertension/age related physcal debility    PAST MEDICAL & SURGICAL HISTORY:  No past medical history on file.   No past surgical history on file.     MEDICATIONS:  Medication administration record for Brigette Ramirez reviewed and reconciled today.    ALLERGIES:  Allergies not on file     REVIEW OF SYSTEMS:  • Appetite: Fair []   Good [x]   Poor []   Weight Loss []  [x]  Weight Stable   Unavoidable Weight Loss []  Tolerating Tube Feeding []    Supplements Provided []   • Constitutional: Negative for fever, chills, diaphoresis or fatigue and weight change.   • HENT: No dysphagia; no new changes to vision/hearing/smell/taste; no epistaxis  • Eyes: Negative for redness and visual disturbance.   • Respiratory: Negative for shortness of breath, chest pain, cough or chest tightness.   • Cardiovascular: Negative for chest pain and palpitations.   • Gastrointestinal: Negative for abdominal distention, abdominal pain and blood in stool.   • Endocrine: Negative for cold intolerance and heat intolerance.   • Genitourinary: Mild dysuria, without hematuria.Negative for hematuria   • Musculoskeletal: Chronic myalgias and arthralgias  • Integumentary: No open wounds, rash or concerning skin lesions  • Neurological: Negative for syncope, weakness and headaches.   • Hematological: Negative for adenopathy. Does not bruise/bleed easily.   • Immunological: Negative for reported allergies or immunological disorders  • Psychological: No depression, anxiety or suicidal  ideation    PHYSICAL EXAMINATION:  VITAL SIGNS: /66, HR 70 bpm, RR 18, weight 133    General Appearance:  [x]  Alert   [x]  Oriented x person  [x]  No acute distress    [x]  Confused  []  Disoriented   []  Comatose   Head:  Atraumatic and normocephalic, without obvious abnormality.   Eyes:          PERRLA, conjunctivae and sclerae normal, no Icterus. No pallor. Extra-occular movements are within normal limits.   Ears:  Ears appear intact with no abnormalities noted.   Throat: No oral lesions, no thrush, oral mucosa moist.   Neck: Supple, trachea midline, no thyromegaly, no carotid bruit.   Back:   No kyphoscoliosis. No tenderness to palpation.   Lungs:   Chest shape is normal.  Audible air exchange noted all lung fields.  No wheezing.  Rhonchus, referred upper congestion that is cleared with light cough.     Heart:  Normal S1 and S2, no murmur, no gallop, no rub. No JVD.   Abdomen:   Normal bowel sounds, no masses, no organomegaly. Soft, non-tender, non-distended, no guarding .  No CVA tenderness.     Extremities: Chronic frail stature, edema, cyanosis or clubbing.  Frail build.    Pulses: Pulses palpable and equal bilaterally.   Skin: No bleeding or rash.  Generalized dry skin noted.  Age-related atrophy of skin.   Neurologic: [x] Normal speech []  Normal mental status    [x] Cranial nerves II through XII intact   [x]  No anosmia [x]  DTR 2+ [x]  Proprioception intact  [x]  No focal motor/sensory deficits     Psych/Mood:        [x]  No acute changes []  Depressed  Urinary:        []  Continent  [x]  Incontinent  []  Retention  []  F/C     []  UTI w/treatment in progress  RECORD REVIEW:   • Consult notes []   • Admission and subsequent notes []   •  [x] I have personally reviewed and interpreted available lab data, radiology studies and ECG obtained at time of visit.  [x] Medication Review: I have reviewed the patients active and prn medications at [facility]     ASSESSMENT   Diagnoses and all  orders for this visit:    Impaired mobility and ADLs    Age-related physical debility    Vascular dementia without behavioral disturbance    Essential hypertension    Chronic respiratory failure with hypoxia (CMS/[facility])    GERD without esophagitis    Type 2 diabetes mellitus treated with insulin (CMS/[facility])        PLAN  Continue supportive care as needed for ADLs and mobilization/transfers.  Without signs of increased work of breathing or oxygen supplementation need at time of my exam, nor reported from nursing.  Blood pressure well controlled and is at goal, patient remains asymptomatic.  No reports of any cyclical/persistent hypoglycemia.  Continue dietary/lifestyle modifications to assist in blood sugar control, no reports of cyclical/persistent hypoglycemia.  No acute behavioral changes from nursing.  Continue surveillance labs as needed.  Weight stability noted, without change.     [x]  Discussed Patient in detail with nursing/staff, addressed all needs today.     [x]  Plan of Care Reviewed   []   PT/OT Reviewed   []  Order Changes  []   Discharge Plans Reviewed         Total face to face time spent with patient 25 minutes, 15 min in counseling.    Jose D Pennington DO.  9/19/2018

## 2018-12-19 NOTE — PROGRESS NOTES
Nursing Home Progress Note      Patient Name: Brigette Ramirez   YOB: 1930    Date of Service: 9/26/18    Facility: [place] []   [place] []   [facility] [x]   [facility] []   Other []       CHIEF COMPLAINT:  CMM/LTC     HISTORY OF PRESENT ILLNESS:  [x]  Follow Up visit for coordination of long term care issues and chronic medical management needs.    Vascular dementia/diabetes mellitus/GERD/chronic respiratory failure with hypoxia/impaired mobility and ADLs/ ypertension/age related physcal debility    PAST MEDICAL & SURGICAL HISTORY:  No past medical history on file.   No past surgical history on file.     MEDICATIONS:  Medication administration record for Brigette Ramirez reviewed and reconciled today.    ALLERGIES:  Allergies not on file     REVIEW OF SYSTEMS:  • Appetite: Fair []   Good [x]   Poor []   Weight Loss []  [x]  Weight Stable   Unavoidable Weight Loss []  Tolerating Tube Feeding []    Supplements Provided []   • Constitutional: Negative for fever, chills, diaphoresis or fatigue and weight change.   • HENT: No dysphagia; no new changes to vision/hearing/smell/taste; no epistaxis  • Eyes: Negative for redness and visual disturbance.   • Respiratory: Negative for shortness of breath, chest pain, cough or chest tightness.   • Cardiovascular: Negative for chest pain and palpitations.   • Gastrointestinal: Negative for abdominal distention, abdominal pain and blood in stool.   • Endocrine: Negative for cold intolerance and heat intolerance.   • Genitourinary: Mild dysuria, without hematuria.Negative for hematuria   • Musculoskeletal: Chronic myalgias and arthralgias  • Integumentary: No open wounds, rash or concerning skin lesions  • Neurological: Negative for syncope, weakness and headaches.   • Hematological: Negative for adenopathy. Does not bruise/bleed easily.   • Immunological: Negative for reported allergies or immunological disorders  • Psychological: No depression, anxiety or suicidal  ideation    PHYSICAL EXAMINATION:  VITAL SIGNS: /73  HR 67  O2 96%  RR 18  TEMP 97.8  WEIGHT 134    General Appearance:  [x]  Alert   [x]  Oriented x person  [x]  No acute distress    [x]  Confused  []  Disoriented   []  Comatose   Head:  Atraumatic and normocephalic, without obvious abnormality.   Eyes:          PERRLA, conjunctivae and sclerae normal, no Icterus. No pallor. Extra-occular movements are within normal limits.   Ears:  Ears appear intact with no abnormalities noted.   Throat: No oral lesions, no thrush, oral mucosa moist.   Neck: Supple, trachea midline, no thyromegaly, no carotid bruit.   Back:   No kyphoscoliosis. No tenderness to palpation.   Lungs:   Chest shape is normal.  Audible air exchange noted all lung fields.  No wheezing.  Rhonchus, referred upper congestion that is cleared with light cough.     Heart:  Normal S1 and S2, no murmur, no gallop, no rub. No JVD.   Abdomen:   Normal bowel sounds, no masses, no organomegaly. Soft, non-tender, non-distended, no guarding .  No CVA tenderness.     Extremities: Chronic frail stature, edema, cyanosis or clubbing.  Frail build.    Pulses: Pulses palpable and equal bilaterally.   Skin: No bleeding or rash.  Generalized dry skin noted.  Age-related atrophy of skin.   Neurologic: [x] Normal speech []  Normal mental status    [x] Cranial nerves II through XII intact   [x]  No anosmia [x]  DTR 2+ [x]  Proprioception intact  [x]  No focal motor/sensory deficits     Psych/Mood:        [x]  No acute changes []  Depressed  Urinary:        []  Continent  [x]  Incontinent  []  Retention  []  F/C     []  UTI w/treatment in progress  RECORD REVIEW:   • Consult notes []   • Admission and subsequent notes []   •  [x] I have personally reviewed and interpreted available lab data, radiology studies and ECG obtained at time of visit.  [x] Medication Review: I [facility]     ASSESSMENT    Diagnoses and all orders for this visit:    Type 2 diabetes mellitus treated with insulin (CMS/[facility])    Vascular dementia without behavioral disturbance    Impaired mobility and ADLs    GERD without esophagitis    Essential hypertension    Chronic respiratory failure with hypoxia (CMS/[facility])    Age-related physical debility        PLAN  Continue supportive care as needed for ADLs and mobilization/transfers.  Without signs of increased work of breathing or oxygen supplementation need at time of my exam, nor reported from nursing.  Blood pressure well controlled and is at goal, patient remains asymptomatic.  No reports of any cyclical/persistent hypoglycemia.  Continue dietary/lifestyle modifications to assist in blood sugar control, no reports of cyclical/persistent hypoglycemia.  No acute behavioral changes from nursing.  Continue surveillance labs.  Weight stability noted, 1 pound gain.     [x]  Discussed Patient in detail with nursing/staff, addressed all needs today.     [x]  Plan of Care Reviewed   []   PT/OT Reviewed   []  Order Changes  []   Discharge Plans Reviewed         Total face to face time spent with patient 25 minutes, 15 min in counseling.      9/26/18

## 2019-01-02 NOTE — PROGRESS NOTES
Nursing Home Progress Note      Patient Name: Brigette Ramirez   YOB: 1930    Date of Service: 10/24/2018    Jose D Pennington, DO [x]  ANDREW Cherry ?  [address] [place] [address]  Phone: [phone]  Fax: [phone] Campbell Larios MD ?  Michele Kearney, DO ?  [address] [place] [address]  Phone: [phone]  Fax: [phone]       Facility: [place] []   [place] []   [facility] [x]   [facility] []   Other []       CHIEF COMPLAINT:  CMM/LTC     HISTORY OF PRESENT ILLNESS:  [x]  Follow Up visit for coordination of long term care issues and chronic medical management needs.    Vascular dementia/diabetes mellitus/GERD/chronic respiratory failure with hypoxia/impaired mobility and ADLs/ ypertension/age related physcal debility    PAST MEDICAL & SURGICAL HISTORY:  No past medical history on file.   No past surgical history on file.     MEDICATIONS:  Medication administration record for Brigette Ramirez reviewed and reconciled today.    ALLERGIES:  Allergies not on file     REVIEW OF SYSTEMS:  • Appetite: Fair []   Good [x]   Poor []   Weight Loss []  [x]  Weight Stable   Unavoidable Weight Loss []  Tolerating Tube Feeding []    Supplements Provided []   • Constitutional: Negative for fever, chills, diaphoresis or fatigue and weight change.   • HENT: No dysphagia; no new changes to vision/hearing/smell/taste; no epistaxis  • Eyes: Negative for redness and visual disturbance.   • Respiratory: Negative for shortness of breath, chest pain, cough or chest tightness.   • Cardiovascular: Negative for chest pain and palpitations.   • Gastrointestinal: Negative for abdominal distention, abdominal pain and blood in stool.   • Endocrine: Negative for cold intolerance and heat intolerance.   • Genitourinary: Mild dysuria, without hematuria.Negative for hematuria   • Musculoskeletal: Chronic myalgias and arthralgias  • Integumentary: No open wounds, rash or concerning skin  lesions  • Neurological: Negative for syncope, weakness and headaches.   • Hematological: Negative for adenopathy. Does not bruise/bleed easily.   • Immunological: Negative for reported allergies or immunological disorders  • Psychological: No depression, anxiety or suicidal ideation    PHYSICAL EXAMINATION:  VITAL SIGNS: /78  HR 70  02 94% 2LPM  RR 16  TEMP 97  WEIGHT 131    General Appearance:  [x]  Alert   [x]  Oriented x person  [x]  No acute distress    [x]  Confused  []  Disoriented   []  Comatose   Head:  Atraumatic and normocephalic, without obvious abnormality.   Eyes:          PERRLA, conjunctivae and sclerae normal, no Icterus. No pallor. Extra-occular movements are within normal limits.   Ears:  Ears appear intact with no abnormalities noted.   Throat: No oral lesions, no thrush, oral mucosa moist.   Neck: Supple, trachea midline, no thyromegaly, no carotid bruit.   Back:   No kyphoscoliosis. No tenderness to palpation.   Lungs:   Chest shape is normal.  Audible air exchange noted all lung fields.  No wheezing.  Rhonchus, referred upper congestion that is cleared with light cough.     Heart:  Normal S1 and S2, no murmur, no gallop, no rub. No JVD.   Abdomen:   Normal bowel sounds, no masses, no organomegaly. Soft, non-tender, non-distended, no guarding .  No CVA tenderness.     Extremities: Chronic frail stature, edema, cyanosis or clubbing.  Frail build.    Pulses: Pulses palpable and equal bilaterally.   Skin: No bleeding or rash.  Generalized dry skin noted.  Age-related atrophy of skin.   Neurologic: [x] Normal speech []  Normal mental status    [x] Cranial nerves II through XII intact   [x]  No anosmia [x]  DTR 2+ [x]  Proprioception intact  [x]  No focal motor/sensory deficits     Psych/Mood:        [x]  No acute changes []  Depressed  Urinary:        []  Continent  [x]  Incontinent  []  Retention  []  F/C     []  UTI w/treatment in progress  RECORD REVIEW:   • Consult notes []   • Admission  and subsequent notes []   •  [x] I have personally reviewed and interpreted available lab data, radiology studies and ECG obtained at time of visit.  [x] Medication Review: I have reviewed the patients active and prn medications at [facility]     ASSESSMENT   Diagnoses and all orders for this visit:    Age-related physical debility    Chronic respiratory failure with hypoxia (CMS/[facility])    Essential hypertension    GERD without esophagitis    Impaired mobility and ADLs    Vascular dementia without behavioral disturbance    Type 2 diabetes mellitus treated with insulin (CMS/[facility])        PLAN  Continue supportive care as needed for ADLs and mobilization/transfers.  Without signs of increased work of breathing or oxygen supplementation need, nor reported from nursing.  Blood pressure not quite at goal, would prefer her systolic blood pressure to be less than 160 ideally, remains asymptomatic.  No reports of any cyclical/persistent hypoglycemia.  Continue dietary/lifestyle modifications to assist in blood sugar control, no reports of cyclical/persistent hypoglycemia.  Adjustments will be made to her insulin regimen to avoid hypoglycemia, however would like to maximize her blood sugar control.  She has a history of being quite brittle with respect to changes of her insulin regimen.  She does, unfortunately, following non-strict dietary intake pattern, routinely eating and altering times.  No acute behavioral changes from nursing.  Continue surveillance labs.  Weight stability noted, no significant changes to her appetite.     [x]  Discussed Patient in detail with nursing/staff, addressed all needs today.     [x]  Plan of Care Reviewed   []   PT/OT Reviewed   []  Order Changes  []   Discharge Plans Reviewed         Total face to face time spent with patient 25 minutes, 15 min in counseling.    Jose D Pennington  10/24/2018

## 2019-01-02 NOTE — PROGRESS NOTES
Nursing Home Progress Note      Patient Name: Brigette Ramirez   YOB: 1930    Date of Service: 10/17/2018    Facility: [place] []   [place] []   [facility] [x]   [facility] []   Other []       Jose D Pennington DO [x]  Shelley Adam, ANDREW ?  [address] [place] [address]  Phone: [phone]  Fax: [phone] Campbell Larios MD ?  Michele Kearney, DO ?  [address] [place] [address]  Phone: [phone]  Fax: [phone]       CHIEF COMPLAINT:  CMM/LTC     HISTORY OF PRESENT ILLNESS:  [x]  Follow Up visit for coordination of long term care issues and chronic medical management needs.    Vascular dementia/diabetes mellitus/GERD/chronic respiratory failure with hypoxia/impaired mobility and ADLs/ ypertension/age related physcal debility    PAST MEDICAL & SURGICAL HISTORY:  No past medical history on file.   No past surgical history on file.     MEDICATIONS:  Medication administration record for Brigette Ramirez reviewed and reconciled today.    ALLERGIES:  Allergies not on file     REVIEW OF SYSTEMS:  • Appetite: Fair []   Good [x]   Poor []   Weight Loss []  [x]  Weight Stable   Unavoidable Weight Loss []  Tolerating Tube Feeding []    Supplements Provided []   • Constitutional: Negative for fever, chills, diaphoresis or fatigue and weight change.   • HENT: No dysphagia; no new changes to vision/hearing/smell/taste; no epistaxis  • Eyes: Negative for redness and visual disturbance.   • Respiratory: Negative for shortness of breath, chest pain, cough or chest tightness.   • Cardiovascular: Negative for chest pain and palpitations.   • Gastrointestinal: Negative for abdominal distention, abdominal pain and blood in stool.   • Endocrine: Negative for cold intolerance and heat intolerance.   • Genitourinary: Mild dysuria, without hematuria.Negative for hematuria   • Musculoskeletal: Chronic myalgias and arthralgias  • Integumentary: No open wounds, rash or concerning skin  lesions  • Neurological: Negative for syncope, weakness and headaches.   • Hematological: Negative for adenopathy. Does not bruise/bleed easily.   • Immunological: Negative for reported allergies or immunological disorders  • Psychological: No depression, anxiety or suicidal ideation    PHYSICAL EXAMINATION:  VITAL SIGNS:     General Appearance:  [x]  Alert   [x]  Oriented x person  [x]  No acute distress    [x]  Confused  []  Disoriented   []  Comatose   Head:  Atraumatic and normocephalic, without obvious abnormality.   Eyes:          PERRLA, conjunctivae and sclerae normal, no Icterus. No pallor. Extra-occular movements are within normal limits.   Ears:  Ears appear intact with no abnormalities noted.   Throat: No oral lesions, no thrush, oral mucosa moist.   Neck: Supple, trachea midline, no thyromegaly, no carotid bruit.   Back:   No kyphoscoliosis. No tenderness to palpation.   Lungs:   Chest shape is normal.  Audible air exchange noted all lung fields.  No wheezing.  Rhonchus, referred upper congestion that is cleared with light cough.     Heart:  Normal S1 and S2, no murmur, no gallop, no rub. No JVD.   Abdomen:   Normal bowel sounds, no masses, no organomegaly. Soft, non-tender, non-distended, no guarding .  No CVA tenderness.     Extremities: Chronic frail stature, edema, cyanosis or clubbing.  Frail build.    Pulses: Pulses palpable and equal bilaterally.   Skin: No bleeding or rash.  Generalized dry skin noted.  Age-related atrophy of skin.   Neurologic: [x] Normal speech []  Normal mental status    [x] Cranial nerves II through XII intact   [x]  No anosmia [x]  DTR 2+ [x]  Proprioception intact  [x]  No focal motor/sensory deficits     Psych/Mood:        [x]  No acute changes []  Depressed  Urinary:        []  Continent  [x]  Incontinent  []  Retention  []  F/C     []  UTI w/treatment in progress  RECORD REVIEW:   • Consult notes []   • Admission and subsequent notes []   •  [x] I have personally  reviewed and interpreted available lab data, radiology studies and ECG obtained at time of visit.  [x] Medication Review: I have reviewed the patients active and prn medications at [facility]     ASSESSMENT   Diagnoses and all orders for this visit:    Type 2 diabetes mellitus treated with insulin (CMS/[facility])    Vascular dementia without behavioral disturbance    Impaired mobility and ADLs    GERD without esophagitis    Essential hypertension    Chronic respiratory failure with hypoxia (CMS/[facility])    Age-related physical debility        PLAN  Continue supportive care as needed for ADLs and mobilization/transfers.  Without signs of increased work of breathing or oxygen supplementation need at time of my exam, nor reported from nursing.  Blood pressure is at goal, patient remains asymptomatic.  No reports of any cyclical/persistent hypoglycemia.  Continue dietary/lifestyle modifications to assist in blood sugar control, no reports of cyclical/persistent hypoglycemia.  No acute behavioral changes from nursing, continue Seroquel.  Continue surveillance labs.  Weight stability noted, change.  Planned decrease of MiraLAX dosing to once daily.    [x]  Discussed Patient in detail with nursing/staff, addressed all needs today.     [x]  Plan of Care Reviewed   []   PT/OT Reviewed   []  Order Changes  []   Discharge Plans Reviewed         Total face to face time spent with patient 25 minutes, 15 min in counseling.    Jose D Peninngton  10/17/2018

## 2019-01-04 ENCOUNTER — NURSING HOME (OUTPATIENT)
Dept: FAMILY MEDICINE CLINIC | Facility: CLINIC | Age: 84
End: 2019-01-04

## 2019-01-04 DIAGNOSIS — E11.65 UNCONTROLLED TYPE 2 DIABETES MELLITUS WITH HYPERGLYCEMIA (HCC): Primary | ICD-10-CM

## 2019-01-04 DIAGNOSIS — Z74.09 IMPAIRED MOBILITY AND ADLS: Chronic | ICD-10-CM

## 2019-01-04 DIAGNOSIS — F01.50 VASCULAR DEMENTIA WITHOUT BEHAVIORAL DISTURBANCE (HCC): Chronic | ICD-10-CM

## 2019-01-04 DIAGNOSIS — Z78.9 IMPAIRED MOBILITY AND ADLS: Chronic | ICD-10-CM

## 2019-01-04 PROCEDURE — 99308 SBSQ NF CARE LOW MDM 20: CPT | Performed by: NURSE PRACTITIONER

## 2019-01-05 PROBLEM — E11.65 UNCONTROLLED TYPE 2 DIABETES MELLITUS WITH HYPERGLYCEMIA (HCC): Status: ACTIVE | Noted: 2018-07-10

## 2019-01-05 NOTE — PROGRESS NOTES
Nursing Home Follow Up Note      Jose D Pennington DO []   ANDREW Cherry [x]  [address] [facility], [place] [address]  Phone: [phone]  Fax: [phone] Campbell Larios MD []    Michele Kearney DO []   [address] [place] [address]  Phone: [phone]  Fax: [phone]     PATIENT NAME: Brigette Ramirez                                                                          YOB: 1930           DATE OF SERVICE: 01/04/2019  FACILITY:  [][place]   [] [place]   [x] [facility]   [] [facility]   [] Other ______________________________________________________________________      CHIEF COMPLAINT:    CMM and LTC needs    HISTORY OF PRESENT ILLNESS:   RV for coordination of LTC needs and medications. Review of labs.    PAST MEDICAL & SURGICAL HISTORY:   No past medical history on file.   No past surgical history on file.      MEDICATIONS:  I have reviewed and reconciled the patients medication list in the patients chart at the skilled nursing facility today.      ALLERGIES:    Allergies not on file      SOCIAL HISTORY:    Social History     Socioeconomic History   • Marital status:      Spouse name: Not on file   • Number of children: Not on file   • Years of education: Not on file   • Highest education level: Not on file   Social Needs   • Financial resource strain: Not on file   • Food insecurity - worry: Not on file   • Food insecurity - inability: Not on file   • Transportation needs - medical: Not on file   • Transportation needs - non-medical: Not on file   Occupational History   • Not on file   Tobacco Use   • Smoking status: Not on file   Substance and Sexual Activity   • Alcohol use: Not on file   • Drug use: Not on file   • Sexual activity: Not on file   Other Topics Concern   • Not on file   Social History Narrative   • Not on file       FAMILY HISTORY:    No family history on file.    REVIEW OF SYSTEMS:    Review of Systems   Constitutional: Negative for activity change,  appetite change, chills, diaphoresis, fatigue, fever, unexpected weight gain and unexpected weight loss.   HENT: Positive for hearing loss. Negative for congestion, ear pain, mouth sores, nosebleeds, postnasal drip, rhinorrhea, sinus pressure, sneezing, sore throat and trouble swallowing.    Eyes: Negative for blurred vision, pain, discharge, redness, itching and visual disturbance.   Respiratory: Negative for apnea, cough, choking, chest tightness, shortness of breath, wheezing and stridor.    Cardiovascular: Negative for chest pain, palpitations and leg swelling.   Gastrointestinal: Negative for abdominal distention, abdominal pain, blood in stool, constipation, diarrhea, nausea, vomiting, GERD and indigestion.   Endocrine: Negative for polydipsia and polyuria.   Genitourinary: Negative for urinary incontinence, decreased urine volume, difficulty urinating, dysuria, flank pain, frequency, hematuria and urgency.   Musculoskeletal: Positive for arthralgias. Negative for back pain, gait problem, joint swelling and myalgias.   Skin: Negative for color change, dry skin, rash and skin lesions.   Allergic/Immunologic: Negative for environmental allergies.   Neurological: Positive for memory problem and confusion. Negative for dizziness, seizures, speech difficulty and weakness.   Psychiatric/Behavioral: Negative for behavioral problems, dysphoric mood, hallucinations, sleep disturbance and depressed mood. The patient is not nervous/anxious.          PHYSICAL EXAMINATION:   VITAL SIGNS: BP: 142/88   HR: 85   02: 94% 3L    RR:18     T: 98.0     Wt: 137    Physical Exam   Constitutional: She appears well-developed and well-nourished.   HENT:   Head: Normocephalic.   Right Ear: External ear normal.   Left Ear: External ear normal.   Nose: Nose normal.   Eyes: Conjunctivae are normal.   Neck: Normal range of motion.   Cardiovascular: Normal rate, regular rhythm, normal heart sounds and intact distal pulses.   Pulmonary/Chest:  Effort normal and breath sounds normal. No respiratory distress. She has no wheezes. She has no rales.   Abdominal: Soft. Bowel sounds are normal. She exhibits no distension and no mass. There is no tenderness. No hernia.   Musculoskeletal: She exhibits no edema.   Generalized weakness. Confined to bed or WC.   Neurological: She is alert.   Skin: Skin is warm and dry. No rash noted.   Psychiatric: She has a normal mood and affect. Her behavior is normal.   Nursing note and vitals reviewed.      RECORDS REVIEW:   I have reviewed and interpreted the following lab test results  obtained at the time of the visit today. 10/01/18 Hgb 10.3, Hct 31.9;  12/1/18 BMP WNL, A1c 9.6    ASSESSMENT     Diagnoses and all orders for this visit:    Uncontrolled type 2 diabetes mellitus with hyperglycemia (CMS/[facility])    Vascular dementia without behavioral disturbance    Impaired mobility and ADLs        PLAN    A1c on 12/1, 9.6. Patient requires insulin to keep her Diabetes controlled but her family does not want her taking insulin, for fear of hypoglycemia.     Dementia symptoms are stable, with no acute behavior changes. Her appetite and weight are stable.    Staff encouraged to keep me informed of any acute changes, lack of improvement, or any new concerning symptoms.    [x]  Discussed Patient in detail with nursing/staff, addressed all needs today.     [x]  Plan of Care Reviewed   []  PT/OT Reviewed   [x]  Order Changes  []  Discharge Plans Reviewed  [x]  Advance Directive on file with Nursing Home.   [x]  POA on file with Nursing Home.   [x]  Code Status listed: []  Full Code   [x]  DNR              ANDREW Ballesteros.

## 2019-01-18 RX ORDER — METHYLPHENIDATE HYDROCHLORIDE 5 MG/1
TABLET ORAL
Qty: 60 TABLET | Refills: 0 | Status: SHIPPED | OUTPATIENT
Start: 2019-01-18 | End: 2019-01-18 | Stop reason: SDUPTHER

## 2019-01-18 RX ORDER — METHYLPHENIDATE HYDROCHLORIDE 5 MG/1
TABLET ORAL
Qty: 60 TABLET | Refills: 0 | Status: SHIPPED | OUTPATIENT
Start: 2019-01-18 | End: 2019-01-24 | Stop reason: SDUPTHER

## 2019-01-24 RX ORDER — METHYLPHENIDATE HYDROCHLORIDE 5 MG/1
TABLET ORAL
Qty: 60 TABLET | Refills: 0 | Status: SHIPPED | OUTPATIENT
Start: 2019-01-24 | End: 2019-04-19 | Stop reason: SDUPTHER

## 2019-01-25 ENCOUNTER — NURSING HOME (OUTPATIENT)
Dept: FAMILY MEDICINE CLINIC | Facility: CLINIC | Age: 84
End: 2019-01-25

## 2019-01-25 DIAGNOSIS — Z74.09 IMPAIRED MOBILITY AND ADLS: Chronic | ICD-10-CM

## 2019-01-25 DIAGNOSIS — D64.9 ANEMIA, UNSPECIFIED TYPE: Primary | ICD-10-CM

## 2019-01-25 DIAGNOSIS — E11.65 UNCONTROLLED TYPE 2 DIABETES MELLITUS WITH HYPERGLYCEMIA (HCC): ICD-10-CM

## 2019-01-25 DIAGNOSIS — F01.50 VASCULAR DEMENTIA WITHOUT BEHAVIORAL DISTURBANCE (HCC): Chronic | ICD-10-CM

## 2019-01-25 DIAGNOSIS — Z78.9 IMPAIRED MOBILITY AND ADLS: Chronic | ICD-10-CM

## 2019-01-25 PROCEDURE — 99308 SBSQ NF CARE LOW MDM 20: CPT | Performed by: NURSE PRACTITIONER

## 2019-01-25 NOTE — PROGRESS NOTES
Nursing Home Follow Up Note      Jose D Pennington DO []   ANDREW Cherry [x]  [address] [facility], [place] [address]  Phone: [phone]  Fax: [phone] Campbell Larios MD []    Michele Kearney DO []   [address] [place] [address]  Phone: [phone]  Fax: [phone]     PATIENT NAME: Brigette Ramirez                                                                          YOB: 1930           DATE OF SERVICE: 01/25/2019  FACILITY:  [][place]   [] [place]   [x] [facility]   [] [facility]   [] Other ______________________________________________________________________      CHIEF COMPLAINT:  Anemia and DM      CMM and LTC needs    HISTORY OF PRESENT ILLNESS:   F/U on labs for Anemia, Diabetes.      RV for coordination of LTC needs and medications. Management of chronic conditions; dementia, impaired mobility.    PAST MEDICAL & SURGICAL HISTORY:   No past medical history on file.   No past surgical history on file.      MEDICATIONS:  I have reviewed and reconciled the patients medication list in the patients chart at the skilled nursing facility today.      ALLERGIES:    Allergies not on file      SOCIAL HISTORY:    Social History     Socioeconomic History   • Marital status:      Spouse name: Not on file   • Number of children: Not on file   • Years of education: Not on file   • Highest education level: Not on file   Social Needs   • Financial resource strain: Not on file   • Food insecurity - worry: Not on file   • Food insecurity - inability: Not on file   • Transportation needs - medical: Not on file   • Transportation needs - non-medical: Not on file   Occupational History   • Not on file   Tobacco Use   • Smoking status: Not on file   Substance and Sexual Activity   • Alcohol use: Not on file   • Drug use: Not on file   • Sexual activity: Not on file   Other Topics Concern   • Not on file   Social History Narrative   • Not on file       FAMILY HISTORY:    No family  history on file.    REVIEW OF SYSTEMS:    Review of Systems   Constitutional: Negative for activity change, appetite change, chills, diaphoresis, fatigue, fever, unexpected weight gain and unexpected weight loss.   HENT: Positive for hearing loss. Negative for congestion, ear pain, mouth sores, nosebleeds, postnasal drip, rhinorrhea, sinus pressure, sneezing, sore throat and trouble swallowing.    Eyes: Negative for blurred vision, pain, discharge, redness, itching and visual disturbance.   Respiratory: Negative for apnea, cough, choking, chest tightness, shortness of breath, wheezing and stridor.    Cardiovascular: Negative for chest pain, palpitations and leg swelling.   Gastrointestinal: Negative for abdominal distention, abdominal pain, blood in stool, constipation, diarrhea, nausea, vomiting, GERD and indigestion.   Endocrine: Negative for polydipsia and polyuria.   Genitourinary: Negative for urinary incontinence, decreased urine volume, difficulty urinating, dysuria, flank pain, frequency, hematuria and urgency.   Musculoskeletal: Positive for arthralgias. Negative for back pain, gait problem, joint swelling and myalgias.   Skin: Negative for color change, dry skin, rash and skin lesions.   Allergic/Immunologic: Negative for environmental allergies.   Neurological: Positive for memory problem and confusion. Negative for dizziness, seizures, speech difficulty and weakness.   Psychiatric/Behavioral: Negative for behavioral problems, dysphoric mood, hallucinations, sleep disturbance and depressed mood. The patient is not nervous/anxious.          PHYSICAL EXAMINATION:   VITAL SIGNS: BP: 118/78 HR: 80   02: 95% 3L    RR:18     T: 97.3     Wt: 135    Physical Exam   Constitutional: She appears well-developed and well-nourished.   HENT:   Head: Normocephalic.   Right Ear: External ear normal.   Left Ear: External ear normal.   Nose: Nose normal.   Eyes: Conjunctivae are normal.   Neck: Normal range of motion.    Cardiovascular: Normal rate, regular rhythm, normal heart sounds and intact distal pulses.   Pulmonary/Chest: Effort normal and breath sounds normal. No respiratory distress. She has no wheezes. She has no rales.   Abdominal: Soft. Bowel sounds are normal. She exhibits no distension and no mass. There is no tenderness. No hernia.   Musculoskeletal: She exhibits no edema.   Generalized weakness. Confined to bed or WC.   Neurological: She is alert.   Skin: Skin is warm and dry. No rash noted.   Psychiatric: She has a normal mood and affect. Her behavior is normal.   Nursing note and vitals reviewed.      RECORDS REVIEW:   I have reviewed and interpreted the following lab test results  obtained at the time of the visit today. 1/7/2019 Hgb 9.9, Hct 31; 1/23/2019 A1c 10.4    ASSESSMENT     Diagnoses and all orders for this visit:    Anemia, unspecified type    Uncontrolled type 2 diabetes mellitus with hyperglycemia (CMS/[facility])    Vascular dementia without behavioral disturbance    Impaired mobility and ADLs        PLAN  Hgb 9.9 and Hct 31 on 1/7/19 increased from Hgb of 9.4 on 12/4/18.  Will continue to monitor.       A1c on 1/23, 10.4. Patient requires insulin to keep her Diabetes controlled but her family does not want her taking insulin, for fear of hypoglycemia.     Dementia symptoms are stable, with no acute behavior changes. Her appetite and weight are stable.    Staff to continue supportive care for all ADLs.     Staff encouraged to keep me informed of any acute changes, lack of improvement, or any new concerning symptoms.    [x]  Discussed Patient in detail with nursing/staff, addressed all needs today.     [x]  Plan of Care Reviewed   []  PT/OT Reviewed   []  Order Changes  []  Discharge Plans Reviewed   [x]  Advance Directive on file with Nursing Home.   [x]  POA on file with Nursing Home.   [x]  Code Status listed: []  Full Code   [x]  DNR              Shelley Adam, ANDREW.

## 2019-01-30 ENCOUNTER — NURSING HOME (OUTPATIENT)
Dept: FAMILY MEDICINE CLINIC | Facility: CLINIC | Age: 84
End: 2019-01-30

## 2019-01-30 DIAGNOSIS — R54 AGE-RELATED PHYSICAL DEBILITY: Chronic | ICD-10-CM

## 2019-01-30 DIAGNOSIS — F01.50 VASCULAR DEMENTIA WITHOUT BEHAVIORAL DISTURBANCE (HCC): Chronic | ICD-10-CM

## 2019-01-30 DIAGNOSIS — Z78.9 IMPAIRED MOBILITY AND ADLS: Primary | Chronic | ICD-10-CM

## 2019-01-30 DIAGNOSIS — J96.11 CHRONIC RESPIRATORY FAILURE WITH HYPOXIA (HCC): Chronic | ICD-10-CM

## 2019-01-30 DIAGNOSIS — I10 ESSENTIAL HYPERTENSION: Chronic | ICD-10-CM

## 2019-01-30 DIAGNOSIS — Z74.09 IMPAIRED MOBILITY AND ADLS: Primary | Chronic | ICD-10-CM

## 2019-01-30 DIAGNOSIS — J44.9 CHRONIC OBSTRUCTIVE BRONCHITIS (HCC): Chronic | ICD-10-CM

## 2019-01-30 DIAGNOSIS — K21.9 GERD WITHOUT ESOPHAGITIS: Chronic | ICD-10-CM

## 2019-01-30 DIAGNOSIS — E11.65 UNCONTROLLED TYPE 2 DIABETES MELLITUS WITH HYPERGLYCEMIA (HCC): ICD-10-CM

## 2019-01-30 PROCEDURE — 99309 SBSQ NF CARE MODERATE MDM 30: CPT | Performed by: FAMILY MEDICINE

## 2019-02-08 ENCOUNTER — NURSING HOME (OUTPATIENT)
Dept: FAMILY MEDICINE CLINIC | Facility: CLINIC | Age: 84
End: 2019-02-08

## 2019-02-08 DIAGNOSIS — J44.9 CHRONIC OBSTRUCTIVE PULMONARY DISEASE, UNSPECIFIED COPD TYPE (HCC): Primary | ICD-10-CM

## 2019-02-08 DIAGNOSIS — F01.50 VASCULAR DEMENTIA WITHOUT BEHAVIORAL DISTURBANCE (HCC): Chronic | ICD-10-CM

## 2019-02-08 DIAGNOSIS — I10 ESSENTIAL HYPERTENSION: Chronic | ICD-10-CM

## 2019-02-08 PROCEDURE — 99308 SBSQ NF CARE LOW MDM 20: CPT | Performed by: NURSE PRACTITIONER

## 2019-02-09 PROBLEM — J44.9 CHRONIC OBSTRUCTIVE PULMONARY DISEASE (HCC): Status: ACTIVE | Noted: 2019-02-09

## 2019-02-10 NOTE — PROGRESS NOTES
Nursing Home Follow Up Note      Jose D Pennington DO []   ANDREW Cherry [x]  [address] [facility], [place] [address]  Phone: [phone]  Fax: [phone] Campbell Larios MD []    Michele Keraney DO []   [address] [place] [address]  Phone: [phone]  Fax: [phone]     PATIENT NAME: Brigette Ramirez                                                                          YOB: 1930           DATE OF SERVICE: 02/8/2019  FACILITY:  [][facility]   [] [place]   [x] [facility]   [] [facility]   [] Other ______________________________________________________________________      CHIEF COMPLAINT:  Unable to use inhaler      CMM and LTC needs    HISTORY OF PRESENT ILLNESS:   Patient is unable to understand how to use her Incruse inhaler properly, and family would like it stopped.    RV for coordination of LTC needs and medications. Management of chronic conditions; dementia, impaired mobility.    PAST MEDICAL & SURGICAL HISTORY:   No past medical history on file.   No past surgical history on file.      MEDICATIONS:  I have reviewed and reconciled the patients medication list in the patients chart at the skilled nursing facility today.      ALLERGIES:    Allergies not on file      SOCIAL HISTORY:    Social History     Socioeconomic History   • Marital status:      Spouse name: Not on file   • Number of children: Not on file   • Years of education: Not on file   • Highest education level: Not on file   Social Needs   • Financial resource strain: Not on file   • Food insecurity - worry: Not on file   • Food insecurity - inability: Not on file   • Transportation needs - medical: Not on file   • Transportation needs - non-medical: Not on file   Occupational History   • Not on file   Tobacco Use   • Smoking status: Not on file   Substance and Sexual Activity   • Alcohol use: Not on file   • Drug use: Not on file   • Sexual activity: Not on file   Other Topics Concern   • Not on file    Social History Narrative   • Not on file       FAMILY HISTORY:    No family history on file.    REVIEW OF SYSTEMS:    Review of Systems   Constitutional: Negative for activity change, appetite change, chills, diaphoresis, fatigue, fever, unexpected weight gain and unexpected weight loss.   HENT: Positive for hearing loss. Negative for congestion, ear pain, mouth sores, nosebleeds, postnasal drip, rhinorrhea, sinus pressure, sneezing, sore throat and trouble swallowing.    Eyes: Negative for blurred vision, pain, discharge, redness, itching and visual disturbance.   Respiratory: Negative for apnea, cough, choking, chest tightness, shortness of breath, wheezing and stridor.    Cardiovascular: Negative for chest pain, palpitations and leg swelling.   Gastrointestinal: Negative for abdominal distention, abdominal pain, blood in stool, constipation, diarrhea, nausea, vomiting, GERD and indigestion.   Endocrine: Negative for polydipsia and polyuria.   Genitourinary: Negative for urinary incontinence, decreased urine volume, difficulty urinating, dysuria, flank pain, frequency, hematuria and urgency.   Musculoskeletal: Positive for arthralgias. Negative for back pain, gait problem, joint swelling and myalgias.   Skin: Negative for color change, dry skin, rash and skin lesions.   Allergic/Immunologic: Negative for environmental allergies.   Neurological: Positive for memory problem and confusion. Negative for dizziness, seizures, speech difficulty and weakness.   Psychiatric/Behavioral: Negative for behavioral problems, dysphoric mood, hallucinations, sleep disturbance and depressed mood. The patient is not nervous/anxious.          PHYSICAL EXAMINATION:   VITAL SIGNS: BP: 158/77   HR: 61   02: 95% 3L    RR:18     T: 97.0     Wt: 134    Physical Exam   Constitutional: She appears well-developed and well-nourished.   HENT:   Head: Normocephalic.   Right Ear: External ear normal.   Left Ear: External ear normal.   Nose:  Nose normal.   Eyes: Conjunctivae are normal.   Neck: Normal range of motion.   Cardiovascular: Normal rate, regular rhythm, normal heart sounds and intact distal pulses.   Pulmonary/Chest: Effort normal and breath sounds normal. No respiratory distress. She has no wheezes. She has no rales.   Abdominal: Soft. Bowel sounds are normal. She exhibits no distension and no mass. There is no tenderness. No hernia.   Musculoskeletal: She exhibits no edema.   Generalized weakness. Confined to bed or WC.   Neurological: She is alert.   Skin: Skin is warm and dry. No rash noted.   Psychiatric: She has a normal mood and affect. Her behavior is normal.   Nursing note and vitals reviewed.      RECORDS REVIEW:   I have reviewed and interpreted the following lab test results  obtained at the time of the visit today. 1/7/2019 Hgb 9.9, Hct 31; 1/23/2019 A1c 10.4    ASSESSMENT     Diagnoses and all orders for this visit:    Chronic obstructive pulmonary disease, unspecified COPD type (CMS/HCC)    Essential hypertension    Vascular dementia without behavioral disturbance        PLAN  Discontinue Incruse, due to inability to use properly. Continue Spiriva and Symbicort, as well as prn inhalers, for COPD. No s/s of acute exacerbation or increased 02 demand.    BP at goal on current medications.     Dementia symptoms are stable, with no acute behavior changes. Her appetite and weight are stable.    Staff to continue supportive care for all ADLs.     Staff encouraged to keep me informed of any acute changes, lack of improvement, or any new concerning symptoms.    [x]  Discussed Patient in detail with nursing/staff, addressed all needs today.     [x]  Plan of Care Reviewed   []  PT/OT Reviewed   [x]  Order Changes  []  Discharge Plans Reviewed   [x]  Advance Directive on file with Nursing Home.   [x]  POA on file with Nursing Home.   [x]  Code Status listed: []  Full Code   [x]  DNR              ANDREW Ballesteros.

## 2019-02-19 RX ORDER — OXAZEPAM 10 MG
10 CAPSULE ORAL NIGHTLY PRN
Qty: 30 CAPSULE | Refills: 5 | Status: SHIPPED | OUTPATIENT
Start: 2019-02-19 | End: 2019-04-23

## 2019-03-13 PROBLEM — J44.9 CHRONIC OBSTRUCTIVE BRONCHITIS (HCC): Chronic | Status: ACTIVE | Noted: 2019-03-13

## 2019-03-13 PROBLEM — J44.9 CHRONIC OBSTRUCTIVE PULMONARY DISEASE (HCC): Chronic | Status: ACTIVE | Noted: 2019-02-09

## 2019-03-13 PROBLEM — J44.89 CHRONIC OBSTRUCTIVE BRONCHITIS: Chronic | Status: ACTIVE | Noted: 2019-03-13

## 2019-03-13 PROBLEM — J44.9 CHRONIC OBSTRUCTIVE PULMONARY DISEASE (HCC): Chronic | Status: RESOLVED | Noted: 2019-02-09 | Resolved: 2019-03-13

## 2019-03-13 NOTE — PROGRESS NOTES
Nursing Home Progress Note      Patient Name: Brigette Ramirez   YOB: 1930    Date of Service: 10/31/2018    Jose D Pennington, DO [x]  ANDREW Cherry ?  [address] [place] [address]  Phone: [phone]  Fax: [phone] Campbell Larios MD ?  Michele Kearney, DO ?  [address] [place] [address]  Phone: [phone]  Fax: [phone]       Facility: [place] []   [facility] []   [facility] [x]   [facility] []   Other []       CHIEF COMPLAINT:  CMM/LTC     HISTORY OF PRESENT ILLNESS:  [x]  Follow Up visit for coordination of long term care issues and chronic medical management needs.    Vascular dementia/diabetes mellitus/GERD/chronic respiratory failure with hypoxia/impaired mobility and ADLs/ ypertension/age related physcal debility    PAST MEDICAL & SURGICAL HISTORY:  No past medical history on file.   No past surgical history on file.     MEDICATIONS:  Medication administration record for Brigette Ramirez reviewed and reconciled today.    ALLERGIES:  Allergies not on file     REVIEW OF SYSTEMS:  • Appetite: Fair []   Good [x]   Poor []   Weight Loss []  [x]  Weight Stable   Unavoidable Weight Loss []  Tolerating Tube Feeding []    Supplements Provided []   • Constitutional: Negative for fever, chills, diaphoresis or fatigue and weight change.   • HENT: No dysphagia; no new changes to vision/hearing/smell/taste; no epistaxis  • Eyes: Negative for redness and visual disturbance.   • Respiratory: Negative for shortness of breath, chest pain, cough or chest tightness.   • Cardiovascular: Negative for chest pain and palpitations.   • Gastrointestinal: Negative for abdominal distention, abdominal pain and blood in stool.   • Endocrine: Negative for cold intolerance and heat intolerance.   • Genitourinary: Mild dysuria, without hematuria.Negative for hematuria   • Musculoskeletal: Chronic myalgias and arthralgias  • Integumentary: No open wounds, rash or concerning skin  lesions  • Neurological: Negative for syncope, weakness and headaches.   • Hematological: Negative for adenopathy. Does not bruise/bleed easily.   • Immunological: Negative for reported allergies or immunological disorders  • Psychological: No depression, anxiety or suicidal ideation    PHYSICAL EXAMINATION:  VITAL SIGNS: /78  HR 70  02 94% 2LPM  RR 16  TEMP 97  WEIGHT 131    General Appearance:  [x]  Alert   [x]  Oriented x person  [x]  No acute distress    [x]  Confused  []  Disoriented   []  Comatose   Head:  Atraumatic and normocephalic, without obvious abnormality.   Eyes:          PERRLA, conjunctivae and sclerae normal, no Icterus. No pallor. Extra-occular movements are within normal limits.   Ears:  Ears appear intact with no abnormalities noted.   Throat: No oral lesions, no thrush, oral mucosa moist.   Neck: Supple, trachea midline, no thyromegaly, no carotid bruit.   Back:   No kyphoscoliosis. No tenderness to palpation.   Lungs:   Chest shape is normal.  Audible air exchange noted all lung fields.  No wheezing.  Rhonchus, referred upper congestion that is cleared with light cough.     Heart:  Normal S1 and S2, no murmur, no gallop, no rub. No JVD.   Abdomen:   Normal bowel sounds, no masses, no organomegaly. Soft, non-tender, non-distended, no guarding .  No CVA tenderness.     Extremities: Chronic frail stature, edema, cyanosis or clubbing.  Frail build.    Pulses: Pulses palpable and equal bilaterally.   Skin: No bleeding or rash.  Generalized dry skin noted.  Age-related atrophy of skin.   Neurologic: [x] Normal speech []  Normal mental status    [x] Cranial nerves II through XII intact   [x]  No anosmia [x]  DTR 2+ [x]  Proprioception intact  [x]  No focal motor/sensory deficits     Psych/Mood:        [x]  No acute changes []  Depressed  Urinary:        []  Continent  [x]  Incontinent  []  Retention  []  F/C     []  UTI w/treatment in progress  RECORD REVIEW:   • Consult notes []   • Admission  and subsequent notes []   •  [x] I have personally reviewed and interpreted available lab data, radiology studies and ECG obtained at time of visit.  [x] Medication Review: I have reviewed the patients active and prn medications at Skilled Nursing Facility     ASSESSMENT   Diagnoses and all orders for this visit:    Impaired mobility and ADLs    Age-related physical debility    Vascular dementia without behavioral disturbance    Chronic respiratory failure with hypoxia (CMS/HCC)    Chronic obstructive bronchitis (CMS/HCC)    Essential hypertension    GERD without esophagitis        PLAN  Continue supportive care as needed for ADLs and mobilization/transfers.  Demonstrates no signs of increased work of breathing or oxygen supplementation need, nor reported from nursing/staff.  Blood pressure is not at goal, though her systolic blood pressure is at goal, diastolic blood pressure is listed above 90; she remains asymptomatic.  Continue to follow clinically with routine blood pressure monitoring, adjustment of her treatment regimen will be made based on her results.  No reports of any cyclical/persistent hypoglycemia since last visit.  Continue dietary modifications to assist in blood sugar control including avoidance of prolonged fasting periods and maintaining adequate hydration status.  Adjustments will be made to her insulin regimen to avoid hypoglycemia, however needs improvement to her blood sugar control.  She has a history of being quite brittle with respect to changes of her insulin regimen in the past.  She has been more adherent to her dietary recommendations.  No acute behavioral changes at this time.  Continue surveillance labs when needed.  Weight stability noted, appetite continues to be good.     [x]  Discussed Patient in detail with nursing/staff, addressed all needs today.     [x]  Plan of Care Reviewed   []   PT/OT Reviewed   []  Order Changes  []   Discharge Plans Reviewed         Total face to face  time spent with patient 25 minutes, 15 min in counseling.    Jose D Pennington  10/31/2018

## 2019-04-03 NOTE — PROGRESS NOTES
Nursing Home Progress Note      Patient Name: Brigette Ramirez   YOB: 1930    Date of Service: 11/28/2018    Jose D Pennington, DO [x]  ANDRE[facility] Cherry ?  [address] [place] [address]  Phone: [phone]  Fax: [phone] Campbell Larios MD ?  Michele Kearney, DO ?  [address] [place] [address]  Phone: [phone]  Fax: [phone]       Facility: [place] []   [place] []   [facility] [x]   [facility] []   Other []       CHIEF COMPLAINT:  CMM/LTC     HISTORY OF PRESENT ILLNESS:  [x]  Follow Up visit for coordination of long term care issues and chronic medical management needs.    Vascular dementia/diabetes mellitus/GERD/chronic respiratory failure with hypoxia/impaired mobility and ADLs/ ypertension/age related physcal debility    PAST MEDICAL & SURGICAL HISTORY:  No past medical history on file.   No past surgical history on file.     MEDICATIONS:  Medication administration record for Brigette Ramierz reviewed and reconciled today.    ALLERGIES:  Allergies not on file     REVIEW OF SYSTEMS:  • Appetite: Fair []   Good [x]   Poor []   Weight Loss []  [x]  Weight Stable   Unavoidable Weight Loss []  Tolerating Tube Feeding []    Supplements Provided []   • Constitutional: Negative for fever, chills, diaphoresis or fatigue and weight change.   • HENT: No dysphagia; no new changes to vision/hearing/smell/taste; no epistaxis  • Eyes: Negative for redness and visual disturbance.   • Respiratory: Negative for shortness of breath, chest pain, cough or chest tightness.   • Cardiovascular: Negative for chest pain and palpitations.   • Gastrointestinal: Negative for abdominal distention, abdominal pain and blood in stool.   • Endocrine: Negative for cold intolerance and heat intolerance.   • Genitourinary: Mild dysuria, without hematuria.Negative for hematuria   • Musculoskeletal: Chronic myalgias and arthralgias  • Integumentary: No open wounds, rash or concerning skin  lesions  • Neurological: Negative for syncope, weakness and headaches.   • Hematological: Negative for adenopathy. Does not bruise/bleed easily.   • Immunological: Negative for reported allergies or immunological disorders  • Psychological: No depression, anxiety or suicidal ideation    PHYSICAL EXAMINATION:  VITAL SIGNS: /76, HR 73 bpm, RR 18, weight 135    General Appearance:  [x]  Alert   [x]  Oriented x person  [x]  No acute distress    [x]  Confused  []  Disoriented   []  Comatose   Head:  Atraumatic and normocephalic, without obvious abnormality.   Eyes:          PERRLA, conjunctivae and sclerae normal, no Icterus. No pallor. Extra-occular movements are within normal limits.   Ears:  Ears appear intact with no abnormalities noted.   Throat: No oral lesions, no thrush, oral mucosa moist.   Neck: Supple, trachea midline, no thyromegaly, no carotid bruit.   Back:   No kyphoscoliosis. No tenderness to palpation.   Lungs:   Chest shape is normal.  Audible air exchange noted all lung fields.  No wheezing.  Rhonchus, referred upper congestion that is cleared with light cough.     Heart:  Normal S1 and S2, no murmur, no gallop, no rub. No JVD.   Abdomen:   Normal bowel sounds, no masses, no organomegaly. Soft, non-tender, non-distended, no guarding .  No CVA tenderness.     Extremities: Chronic frail stature, edema, cyanosis or clubbing.  Frail build.    Pulses: Pulses palpable and equal bilaterally.   Skin: No bleeding or rash.  Generalized dry skin noted.  Age-related atrophy of skin.   Neurologic: [x] Normal speech []  Normal mental status    [x] Cranial nerves II through XII intact   [x]  No anosmia [x]  DTR 2+ [x]  Proprioception intact  [x]  No focal motor/sensory deficits     Psych/Mood:        [x]  No acute changes []  Depressed  Urinary:        []  Continent  [x]  Incontinent  []  Retention  []  F/C     []  UTI w/treatment in progress  RECORD REVIEW:   • Consult notes []   • Admission and subsequent  notes []   •  [x] I have personally reviewed and interpreted available lab data, radiology studies and ECG obtained at time of visit.  [x] Medication Review: I have reviewed the patients active and prn medications at Skilled Nursing Facility     ASSESSMENT   Diagnoses and all orders for this visit:    Impaired mobility and ADLs    Age-related physical debility    Uncontrolled type 2 diabetes mellitus with hyperglycemia (CMS/HCC)    Chronic respiratory failure with hypoxia (CMS/HCC)    Essential hypertension    Chronic obstructive bronchitis (CMS/HCC)    GERD without esophagitis    Vascular dementia without behavioral disturbance        PLAN  Continue supportive care as needed for ADLs and mobilization/transfers.  No findings or reports of increased work of breathing, nor oxygen supplementation need.  BP is at goal, remains hemodynamically asymptomatic.  No reports of any cyclical/persistent hypoglycemia.  Continue dietary modifications to assist in blood glucose control including avoidance of prolonged fasting periods and adequate hydration status as best able.  Adjustments continue to be made to her insulin regimen to avoid hypoglycemia.  She has a history of being quite brittle with respect to changes of her insulin regimen in the past.  She has been more compliant with recommendations to her diet.  No acute behavioral changes at this time reported from staff.  Continue surveillance labs.  Relative weight stability noted, appetite continues to be good, approximate 2 pound change.     [x]  Discussed Patient in detail with nursing/staff, addressed all needs today.     [x]  Plan of Care Reviewed   []   PT/OT Reviewed   []  Order Changes  []   Discharge Plans Reviewed         Total face to face time spent with patient 25 minutes, 15 min in counseling.    Jose D Pennington  11/28/2018

## 2019-04-19 RX ORDER — METHYLPHENIDATE HYDROCHLORIDE 5 MG/1
TABLET ORAL
Qty: 60 TABLET | Refills: 0 | Status: SHIPPED | OUTPATIENT
Start: 2019-04-19 | End: 2019-06-14 | Stop reason: SDUPTHER

## 2019-04-23 ENCOUNTER — NURSING HOME (OUTPATIENT)
Dept: FAMILY MEDICINE CLINIC | Facility: CLINIC | Age: 84
End: 2019-04-23

## 2019-04-23 DIAGNOSIS — J44.9 CHRONIC OBSTRUCTIVE BRONCHITIS (HCC): Chronic | ICD-10-CM

## 2019-04-23 DIAGNOSIS — F01.50 VASCULAR DEMENTIA WITHOUT BEHAVIORAL DISTURBANCE (HCC): Chronic | ICD-10-CM

## 2019-04-23 DIAGNOSIS — R05.9 COUGH: Primary | ICD-10-CM

## 2019-04-23 DIAGNOSIS — Z74.09 IMPAIRED MOBILITY AND ADLS: Chronic | ICD-10-CM

## 2019-04-23 DIAGNOSIS — I10 ESSENTIAL HYPERTENSION: Chronic | ICD-10-CM

## 2019-04-23 DIAGNOSIS — Z78.9 IMPAIRED MOBILITY AND ADLS: Chronic | ICD-10-CM

## 2019-04-23 DIAGNOSIS — F41.9 ANXIETY: ICD-10-CM

## 2019-04-23 PROCEDURE — 99309 SBSQ NF CARE MODERATE MDM 30: CPT | Performed by: NURSE PRACTITIONER

## 2019-04-23 RX ORDER — TEMAZEPAM 7.5 MG/1
7.5 CAPSULE ORAL NIGHTLY PRN
Qty: 30 CAPSULE | Refills: 0 | Status: SHIPPED | OUTPATIENT
Start: 2019-04-23 | End: 2019-05-23

## 2019-04-23 NOTE — PROGRESS NOTES
Nursing Home Follow Up Note      Jose D Pennington DO []   ANDREW Cherry [x]  [address] [place] [address]  Phone: [phone]  Fax: [phone] Campbell Larios MD []    Michele Kearney DO []   [address] [place] [address]  Phone: [phone]  Fax: [phone]     PATIENT NAME: Brigette Ramirez                                                                          YOB: 1930           DATE OF SERVICE: 04/23/2019  FACILITY:  [][place]   [] [place]   [x] [facility]   [] [facility]   [] Other ______________________________________________________________________      CHIEF COMPLAINT:  Cough and congestion      CMM and LTC needs    HISTORY OF PRESENT ILLNESS:   Patient has had cough and congestion for the past 2 days. Family would like her to have a chest xray    RV for coordination of LTC needs and medications. Management of chronic conditions; HTN, dementia, anxiety, insomnia,  impaired mobility.    PAST MEDICAL & SURGICAL HISTORY:   No past medical history on file.   No past surgical history on file.      MEDICATIONS:  I have reviewed and reconciled the patients medication list in the patients chart at the skilled nursing facility today.      ALLERGIES:    Allergies not on file      SOCIAL HISTORY:    Social History     Socioeconomic History   • Marital status:      Spouse name: Not on file   • Number of children: Not on file   • Years of education: Not on file   • Highest education level: Not on file       FAMILY HISTORY:    No family history on file.    REVIEW OF SYSTEMS:    Review of Systems   Constitutional: Negative for activity change, appetite change, chills, diaphoresis, fatigue, fever, unexpected weight gain and unexpected weight loss.   HENT: Positive for congestion and hearing loss. Negative for ear pain, mouth sores, nosebleeds, postnasal drip, rhinorrhea, sinus pressure, sneezing, sore throat and trouble swallowing.    Eyes: Negative for blurred vision,  pain, discharge, redness, itching and visual disturbance.   Respiratory: Positive for cough. Negative for apnea, choking, chest tightness, shortness of breath, wheezing and stridor.    Cardiovascular: Negative for chest pain, palpitations and leg swelling.   Gastrointestinal: Negative for abdominal distention, abdominal pain, blood in stool, constipation, diarrhea, nausea, vomiting, GERD and indigestion.   Endocrine: Negative for polydipsia and polyuria.   Genitourinary: Negative for urinary incontinence, decreased urine volume, difficulty urinating, dysuria, flank pain, frequency, hematuria and urgency.   Musculoskeletal: Positive for arthralgias. Negative for back pain, gait problem, joint swelling and myalgias.   Skin: Negative for color change, dry skin, rash and skin lesions.   Allergic/Immunologic: Negative for environmental allergies.   Neurological: Positive for memory problem and confusion. Negative for dizziness, seizures, speech difficulty and weakness.   Psychiatric/Behavioral: Negative for behavioral problems, dysphoric mood, hallucinations, sleep disturbance and depressed mood. The patient is not nervous/anxious.          PHYSICAL EXAMINATION:   VITAL SIGNS: BP: 132/74   HR: 70    02: 97% 3L    RR:18     T: 97.9    Wt: 129    Physical Exam   Constitutional: She appears well-developed and well-nourished.   HENT:   Head: Normocephalic.   Right Ear: External ear normal.   Left Ear: External ear normal.   Nose: Nose normal.   Eyes: Conjunctivae are normal.   Neck: Normal range of motion.   Cardiovascular: Normal rate, regular rhythm, normal heart sounds and intact distal pulses.   Pulmonary/Chest: Effort normal. No respiratory distress. She has decreased breath sounds in the right middle field and the left middle field. She has wheezes in the right upper field, the right middle field, the left upper field and the left middle field. She has no rales.   Abdominal: Soft. Bowel sounds are normal. She exhibits  no distension and no mass. There is no tenderness. No hernia.   Musculoskeletal: She exhibits no edema.   Generalized weakness. Confined to bed or WC.   Neurological: She is alert.   Skin: Skin is warm and dry. No rash noted.   Psychiatric: She has a normal mood and affect. Her behavior is normal.   Nursing note and vitals reviewed.      RECORDS REVIEW:   I have reviewed and interpreted the following lab test results  obtained at the time of the visit today. 1/7/2019 Hgb 9.9, Hct 31; 1/23/2019 A1c 10.4    ASSESSMENT     Diagnoses and all orders for this visit:    Cough    Chronic obstructive bronchitis (CMS/HCC)    Essential hypertension    Anxiety  -     temazepam (RESTORIL) 7.5 MG capsule; Take 1 capsule by mouth At Night As Needed for Sleep for up to 30 days.    Vascular dementia without behavioral disturbance    Impaired mobility and ADLs        PLAN  Patient with increased cough and congestion, but no increased 02 demand. Will get CXR for further evaluation. Staff to notify of results.    BP at goal on current medications.     Anxiety well controlled on Temazepam qhs.     Dementia symptoms are stable, with no acute behavior changes. Her appetite and weight are stable.    Staff to continue supportive care for all ADLs.     Staff encouraged to keep me informed of any acute changes, lack of improvement, or any new concerning symptoms.    [x]  Discussed Patient in detail with nursing/staff, addressed all needs today.     [x]  Plan of Care Reviewed   []  PT/OT Reviewed   [x]  Order Changes  []  Discharge Plans Reviewed   [x]  Advance Directive on file with Nursing Home.   [x]  POA on file with Nursing Home.   [x]  Code Status listed: []  Full Code   [x]  DNR              Shelley Adam, ANDREW.

## 2019-05-21 ENCOUNTER — NURSING HOME (OUTPATIENT)
Dept: FAMILY MEDICINE CLINIC | Facility: CLINIC | Age: 84
End: 2019-05-21

## 2019-05-21 DIAGNOSIS — J44.9 CHRONIC OBSTRUCTIVE BRONCHITIS (HCC): ICD-10-CM

## 2019-05-21 DIAGNOSIS — I10 ESSENTIAL HYPERTENSION: Chronic | ICD-10-CM

## 2019-05-21 DIAGNOSIS — Z74.09 IMPAIRED MOBILITY AND ADLS: Chronic | ICD-10-CM

## 2019-05-21 DIAGNOSIS — Z78.9 IMPAIRED MOBILITY AND ADLS: Chronic | ICD-10-CM

## 2019-05-21 DIAGNOSIS — F01.50 VASCULAR DEMENTIA WITHOUT BEHAVIORAL DISTURBANCE (HCC): Chronic | ICD-10-CM

## 2019-05-21 DIAGNOSIS — R41.0 CONFUSION: ICD-10-CM

## 2019-05-21 PROCEDURE — 99309 SBSQ NF CARE MODERATE MDM 30: CPT | Performed by: NURSE PRACTITIONER

## 2019-05-21 NOTE — PROGRESS NOTES
"Nursing Home Follow Up Note      Jose D Pennington DO []   ANDREW Cherry [x]  [address] [place] [address]  Phone: [phone]  Fax: [phone] Campbell Larios MD []    Michele Kearney DO []   [address] [place] [address]  Phone: [phone]  Fax: [phone]     PATIENT NAME: Brigette Ramirez                                                                          YOB: 1930           DATE OF SERVICE: 05/21/2019  FACILITY:  [][place]   [] [place]   [x] [facility]   [] [facility]   [] Other ______________________________________________________________________      \" I personally performed the service documented in this note. The ancillary staff only assisted in transcribing the note and was not involved in any other part of rendering this service\".    CHIEF COMPLAINT:  Increased confusion      CMM and LTC needs    HISTORY OF PRESENT ILLNESS:   Patient has had increased confusion for the couple days, per family, and they would like a UA performed on her.     RV for coordination of LTC needs and medications. Management of chronic conditions; HTN, dementia, anxiety, insomnia,  impaired mobility.    PAST MEDICAL & SURGICAL HISTORY:   No past medical history on file.   No past surgical history on file.      MEDICATIONS:  I have reviewed and reconciled the patients medication list in the patients chart at the skilled nursing facility today.      ALLERGIES:    Allergies not on file      SOCIAL HISTORY:    Social History     Socioeconomic History   • Marital status:      Spouse name: Not on file   • Number of children: Not on file   • Years of education: Not on file   • Highest education level: Not on file       FAMILY HISTORY:    No family history on file.    REVIEW OF SYSTEMS:    Review of Systems   Constitutional: Negative for activity change, appetite change, chills, diaphoresis, fatigue, fever, unexpected weight gain and unexpected weight loss.   HENT: Positive for " hearing loss. Negative for congestion, ear pain, mouth sores, nosebleeds, postnasal drip, rhinorrhea, sinus pressure, sneezing, sore throat and trouble swallowing.    Eyes: Negative for blurred vision, pain, discharge, redness, itching and visual disturbance.   Respiratory: Positive for cough and wheezing. Negative for apnea, choking, chest tightness, shortness of breath and stridor.    Cardiovascular: Negative for chest pain, palpitations and leg swelling.   Gastrointestinal: Negative for abdominal distention, abdominal pain, blood in stool, constipation, diarrhea, nausea, vomiting, GERD and indigestion.   Endocrine: Negative for polydipsia and polyuria.   Genitourinary: Negative for urinary incontinence, decreased urine volume, difficulty urinating, dysuria, flank pain, frequency, hematuria and urgency.   Musculoskeletal: Positive for arthralgias. Negative for back pain, gait problem, joint swelling and myalgias.   Skin: Negative for color change, dry skin, rash and skin lesions.   Allergic/Immunologic: Negative for environmental allergies.   Neurological: Positive for memory problem and confusion. Negative for dizziness, seizures, speech difficulty and weakness.   Psychiatric/Behavioral: Negative for behavioral problems, dysphoric mood, hallucinations, sleep disturbance and depressed mood. The patient is not nervous/anxious.      Exam re-performed and the findings are noted    PHYSICAL EXAMINATION:   VITAL SIGNS: BP: 146/79   HR: 84    02: 96%    RR:18     T: 97   Wt: 128    Physical Exam   Constitutional: She appears well-developed and well-nourished.   HENT:   Head: Normocephalic.   Right Ear: External ear normal.   Left Ear: External ear normal.   Nose: Nose normal.   Eyes: Conjunctivae are normal.   Neck: Normal range of motion.   Cardiovascular: Normal rate, regular rhythm, normal heart sounds and intact distal pulses.   Pulmonary/Chest: Effort normal. No respiratory distress. She has decreased breath sounds  in the right upper field, the right middle field, the left upper field and the left middle field. She has no rhonchi. She has no rales.   Abdominal: Soft. Bowel sounds are normal. She exhibits no distension and no mass. There is no tenderness. No hernia.   Musculoskeletal: She exhibits no edema.   Generalized weakness. Confined to bed or WC.   Neurological: She is alert.   Skin: Skin is warm and dry. No rash noted.   Psychiatric: She has a normal mood and affect. Her behavior is normal.   Nursing note and vitals reviewed.    Exam re-performed and the findings are noted.      RECORDS REVIEW:   I have reviewed and interpreted the following lab test results  obtained at the time of the visit today. 1/7/2019 Hgb 9.9, Hct 31; 1/23/2019 A1c 10.4    ASSESSMENT     Diagnoses and all orders for this visit:    Vascular dementia without behavioral disturbance    Confusion    Chronic obstructive bronchitis (CMS/HCC)    Essential hypertension    Impaired mobility and ADLs        PLAN  Patient with increased confusion, per family, the past couple days. Her appetite is still stable.     COPD without s/s of acute exacerbation and not increased 02 demand.     BP at goal on current medications.     Staff to continue supportive care for all ADLs.     Staff encouraged to keep me informed of any acute changes, lack of improvement, or any new concerning symptoms.    [x]  Discussed Patient in detail with nursing/staff, addressed all needs today.     [x]  Plan of Care Reviewed   []  PT/OT Reviewed   [x]  Order Changes  []  Discharge Plans Reviewed   [x]  Advance Directive on file with Nursing Home.   [x]  POA on file with Nursing Home.   [x]  Code Status listed: []  Full Code   [x]  DNR

## 2019-06-05 NOTE — PROGRESS NOTES
Nursing Home Progress Note      Patient Name: Brigette Ramirez   YOB: 1930    Date of Service: 1/30/2019    Jose D Pennington, DO [x]  ANDREW Cherry ?  [address] [place] [address]  Phone: [phone]  Fax: [phone] Campbell Larios MD ?  Michele Kearney, DO ?  [address] [place] [address]  Phone: [phone]  Fax: [phone]       Facility: [place] []   [place] []   [facility] [x]   [facility] []   Other []       CHIEF COMPLAINT:  CMM/LTC     HISTORY OF PRESENT ILLNESS:  [x]  Follow Up visit for coordination of long term care issues and chronic medical management needs.    Vascular dementia/diabetes mellitus/GERD/chronic respiratory failure with hypoxia/impaired mobility and ADLs/ ypertension/age related physcal debility    PAST MEDICAL & SURGICAL HISTORY:  No past medical history on file.   No past surgical history on file.     MEDICATIONS:  Medication administration record for Brigette Ramirez reviewed and reconciled today.    ALLERGIES:  Allergies not on file     REVIEW OF SYSTEMS:  • Appetite: Fair []   Good [x]   Poor []   Weight Loss []  [x]  Weight Stable   Unavoidable Weight Loss []  Tolerating Tube Feeding []    Supplements Provided []   • Constitutional: Negative for fever, chills, diaphoresis or fatigue and weight change.   • HENT: No dysphagia; no new changes to vision/hearing/smell/taste; no epistaxis  • Eyes: Negative for redness and visual disturbance.   • Respiratory: Negative for shortness of breath, chest pain, cough or chest tightness.   • Cardiovascular: Negative for chest pain and palpitations.   • Gastrointestinal: Negative for abdominal distention, abdominal pain and blood in stool.   • Endocrine: Negative for cold intolerance and heat intolerance.   • Genitourinary: Mild dysuria, without hematuria.Negative for hematuria   • Musculoskeletal: Chronic myalgias and arthralgias  • Integumentary: No open wounds, rash or concerning skin  lesions  • Neurological: Negative for syncope, weakness and headaches.   • Hematological: Negative for adenopathy. Does not bruise/bleed easily.   • Immunological: Negative for reported allergies or immunological disorders  • Psychological: No depression, anxiety or suicidal ideation    PHYSICAL EXAMINATION:  VITAL SIGNS: /74, HR 69 bpm, RR 18, weight 134    General Appearance:  [x]  Alert   [x]  Oriented x person  [x]  No acute distress    [x]  Confused  []  Disoriented   []  Comatose   Head:  Atraumatic and normocephalic, without obvious abnormality.   Eyes:          PERRLA, conjunctivae and sclerae normal, no Icterus. No pallor. Extra-occular movements are within normal limits.   Ears:  Ears appear intact with no abnormalities noted.   Throat: No oral lesions, no thrush, oral mucosa moist.   Neck: Supple, trachea midline, no thyromegaly, no carotid bruit.   Back:   No kyphoscoliosis. No tenderness to palpation.   Lungs:   Chest shape is normal.  Audible air exchange noted all lung fields.  No wheezing.  Rhonchus, referred upper congestion that is cleared with light cough.     Heart:  Normal S1 and S2, no murmur, no gallop, no rub. No JVD.   Abdomen:   Normal bowel sounds, no masses, no organomegaly. Soft, non-tender, non-distended, no guarding .  No CVA tenderness.     Extremities: Chronic frail stature, edema, cyanosis or clubbing.  Frail build.    Pulses: Pulses palpable and equal bilaterally.   Skin: No bleeding or rash.  Generalized dry skin noted.  Age-related atrophy of skin.   Neurologic: [x] Normal speech []  Normal mental status    [x] Cranial nerves II through XII intact   [x]  No anosmia [x]  DTR 2+ [x]  Proprioception intact  [x]  No focal motor/sensory deficits     Psych/Mood:        [x]  No acute changes []  Depressed  Urinary:        []  Continent  [x]  Incontinent  []  Retention  []  F/C     []  UTI w/treatment in progress  RECORD REVIEW:   • Consult notes []   • Admission and subsequent  notes []   •  [x] I have personally reviewed and interpreted available lab data, radiology studies and ECG obtained at time of visit.  [x] Medication Review: I have reviewed the patients active and prn medications at [facility]     ASSESSMENT   Diagnoses and all orders for this visit:    Impaired mobility and ADLs    Age-related physical debility    Uncontrolled type 2 diabetes mellitus with hyperglycemia (CMS/HCC)    Chronic respiratory failure with hypoxia (CMS/HCC)    Chronic obstructive bronchitis (CMS/HCC)    GERD without esophagitis    Essential hypertension    Vascular dementia without behavioral disturbance        PLAN  Continue supportive care as needed for ADLs and all mobilization/transfers needs.  No findings or reports of increased work of breathing, nor oxygen supplementation need.  BP remains at goal, continues to be hemodynamically asymptomatic.  No reports of any cyclical/persistent hypoglycemia, although I have increased her insulin, Lantus, from 10 units to 18 units; family has been routinely performing fingersticks, and I  discussed the need to avoid random fingersticks on patient by anyone other than clinical staff at facility.,  Due to facility regulations and the avoidance of incorrect timing of fingersticks affecting the potential of inappropriate short acting insulin administration; planned timing of fingersticks by clinical staff will  allow a more accurate reflection of her short acting insulin need, as well as future increases to her long-acting insulin.  Continue dietary modifications to assist in blood glucose control including avoidance of prolonged fasting periods and adequate hydration status as best able.  She has a history of being quite brittle with respect to changes of her insulin regimen in the past.  I have discussed again with the family the need to apply it with dietary recommendations.  No acute behavioral changes at this time reported from staff.  Continue  surveillance labs when needed.  Relative weight stability noted, appetite is good, approximate 2 pound change since last visit, of no clinical significance.     [x]  Discussed Patient in detail with nursing/staff, addressed all needs today.     [x]  Plan of Care Reviewed   []   PT/OT Reviewed   []  Order Changes  []   Discharge Plans Reviewed         Total face to face time spent with patient 25 minutes, 15 min in counseling.    Jose D Pennington  1/30/2019

## 2019-06-07 ENCOUNTER — NURSING HOME (OUTPATIENT)
Dept: FAMILY MEDICINE CLINIC | Facility: CLINIC | Age: 84
End: 2019-06-07

## 2019-06-07 DIAGNOSIS — E11.65 UNCONTROLLED TYPE 2 DIABETES MELLITUS WITH HYPERGLYCEMIA (HCC): ICD-10-CM

## 2019-06-07 DIAGNOSIS — I10 ESSENTIAL HYPERTENSION: Chronic | ICD-10-CM

## 2019-06-07 DIAGNOSIS — R07.81 RIB PAIN ON LEFT SIDE: ICD-10-CM

## 2019-06-07 PROCEDURE — 99309 SBSQ NF CARE MODERATE MDM 30: CPT | Performed by: NURSE PRACTITIONER

## 2019-06-07 NOTE — PROGRESS NOTES
"Nursing Home Follow Up Note      Jose D Pennington DO []   ANDREW Cherry [x]  [address] [place] [address]  Phone: [phone]  Fax: [phone] Campbell Larios MD []    Michele Kearney DO []   [address] [place] [address]  Phone: [phone]  Fax: [phone]     PATIENT NAME: Brigette Ramirez                                                                          YOB: 1930           DATE OF SERVICE: 06/07/2019  FACILITY:  [][place]   [] [place]   [x] [facility]   [] [facility]   [] Other ______________________________________________________________________      \" I personally performed the service documented in this note. The ancillary staff only assisted in transcribing the note and was not involved in any other part of rendering this service\".    CHIEF COMPLAINT:  Increased pain/tenderness under left breast in rib area.       CMM and LTC needs    HISTORY OF PRESENT ILLNESS:   Per staff and family, patient has increased pain/tenderness on left side, under her ribs, with moving and turning patient. Family at bedside with patient during visit.    RV for coordination of LTC needs and medications. Management of chronic conditions; HTN, dementia, anxiety, insomnia,  impaired mobility.    PAST MEDICAL & SURGICAL HISTORY:   Diabetes  No past surgical history on file.      MEDICATIONS:  I have reviewed and reconciled the patients medication list in the patients chart at the skilled nursing facility today.      ALLERGIES:    Allergies not on file      SOCIAL HISTORY:    Social History     Socioeconomic History   • Marital status:      Spouse name: Not on file   • Number of children: Not on file   • Years of education: Not on file   • Highest education level: Not on file       FAMILY HISTORY:    No family history on file.    REVIEW OF SYSTEMS:    Review of Systems   Constitutional: Negative for activity change, appetite change, chills, diaphoresis, fatigue, fever, " unexpected weight gain and unexpected weight loss.        Pain/tenderness on left side under breast, near rib area.   HENT: Positive for hearing loss. Negative for congestion, ear pain, mouth sores, nosebleeds, postnasal drip, rhinorrhea, sinus pressure, sneezing, sore throat and trouble swallowing.    Eyes: Negative for blurred vision, pain, discharge, redness, itching and visual disturbance.   Respiratory: Negative for apnea, cough, choking, chest tightness, shortness of breath, wheezing and stridor.    Cardiovascular: Negative for chest pain, palpitations and leg swelling.   Gastrointestinal: Negative for abdominal distention, abdominal pain, blood in stool, constipation, diarrhea, nausea, vomiting, GERD and indigestion.   Endocrine: Negative for polydipsia and polyuria.   Genitourinary: Negative for urinary incontinence, decreased urine volume, difficulty urinating, dysuria, flank pain, frequency, hematuria and urgency.   Musculoskeletal: Positive for arthralgias. Negative for back pain, gait problem, joint swelling and myalgias.   Skin: Negative for color change, dry skin, rash and skin lesions.   Allergic/Immunologic: Negative for environmental allergies.   Neurological: Positive for memory problem and confusion. Negative for dizziness, seizures, speech difficulty and weakness.   Psychiatric/Behavioral: Negative for behavioral problems, dysphoric mood, hallucinations, sleep disturbance and depressed mood. The patient is not nervous/anxious.      Exam re-performed and the findings are noted    PHYSICAL EXAMINATION:   VITAL SIGNS: BP: 114/94   HR: 64    02: 99% on 2L    RR:16     T: 97.2   Wt: 128    Physical Exam   Constitutional: She appears well-developed and well-nourished.   HENT:   Head: Normocephalic.   Right Ear: External ear normal.   Left Ear: External ear normal.   Nose: Nose normal.   Eyes: Conjunctivae are normal.   Neck: Normal range of motion.   Cardiovascular: Normal rate, regular rhythm, normal  heart sounds and intact distal pulses.   Pulmonary/Chest: Effort normal. No stridor. No respiratory distress. She has decreased breath sounds in the right upper field, the right middle field, the left upper field and the left middle field. She has no wheezes. She has no rhonchi. She has no rales. She exhibits tenderness.   Tenderness with palpation and ROM, in left side ribs, just inferior to breast     Abdominal: Soft. Bowel sounds are normal. She exhibits no distension and no mass. There is no tenderness. No hernia.   Musculoskeletal: She exhibits no edema.   Generalized weakness. Confined to bed or WC.   Neurological: She is alert.   Skin: Skin is warm and dry. No rash noted.   Psychiatric: She has a normal mood and affect. Her behavior is normal.   Nursing note and vitals reviewed.    Exam re-performed and the findings are noted.      RECORDS REVIEW:   I have reviewed and interpreted the following lab test results  obtained at the time of the visit today. 6/6/2019 Hgb 9.6, Hct 28.7  A1c 6.8 , Creat 0.7, BUN/Creat ration 27, Albumin 3.3    ASSESSMENT   RIB PAIN LEFT SIDE    UNCONTROLLED DM WITH HYPERGLYCEMIA    ESSENTIAL HYPERTENSION        PLAN  Chest x-ray with left rib series ordered, for further evaluation of her pain. Discussed  plan with daughter, at BS, is agreeable.     A1C is 6.8 on 6/6/2019    BP at goal on current medications.     Staff to continue supportive care for all ADLs.     Staff encouraged to keep me informed of any acute changes, lack of improvement, or any new concerning symptoms.    [x]  Discussed Patient in detail with nursing/staff, addressed all needs today.     [x]  Plan of Care Reviewed   []  PT/OT Reviewed   [x]  Order Changes  []  Discharge Plans Reviewed   [x]  Advance Directive on file with Nursing Home.   [x]  POA on file with Nursing Home.   [x]  Code Status listed: []  Full Code   [x]  DNR

## 2019-06-14 RX ORDER — METHYLPHENIDATE HYDROCHLORIDE 5 MG/1
TABLET ORAL
Qty: 60 TABLET | Refills: 0 | Status: SHIPPED | OUTPATIENT
Start: 2019-06-14 | End: 2019-06-19 | Stop reason: SDUPTHER

## 2019-06-19 RX ORDER — METHYLPHENIDATE HYDROCHLORIDE 5 MG/1
TABLET ORAL
Qty: 60 TABLET | Refills: 0 | Status: SHIPPED | OUTPATIENT
Start: 2019-06-19 | End: 2019-07-30 | Stop reason: SDUPTHER

## 2019-06-28 ENCOUNTER — NURSING HOME (OUTPATIENT)
Dept: FAMILY MEDICINE CLINIC | Facility: CLINIC | Age: 84
End: 2019-06-28

## 2019-06-28 DIAGNOSIS — F01.50 VASCULAR DEMENTIA WITHOUT BEHAVIORAL DISTURBANCE (HCC): Chronic | ICD-10-CM

## 2019-06-28 DIAGNOSIS — Z78.9 IMPAIRED MOBILITY AND ADLS: Chronic | ICD-10-CM

## 2019-06-28 DIAGNOSIS — I10 ESSENTIAL HYPERTENSION: Chronic | ICD-10-CM

## 2019-06-28 DIAGNOSIS — Z74.09 IMPAIRED MOBILITY AND ADLS: Chronic | ICD-10-CM

## 2019-06-28 DIAGNOSIS — J44.9 CHRONIC OBSTRUCTIVE BRONCHITIS (HCC): Chronic | ICD-10-CM

## 2019-06-28 PROCEDURE — 99309 SBSQ NF CARE MODERATE MDM 30: CPT | Performed by: NURSE PRACTITIONER

## 2019-07-01 PROBLEM — R07.81 RIB PAIN ON LEFT SIDE: Status: RESOLVED | Noted: 2019-06-07 | Resolved: 2019-07-01

## 2019-07-12 ENCOUNTER — NURSING HOME (OUTPATIENT)
Dept: FAMILY MEDICINE CLINIC | Facility: CLINIC | Age: 84
End: 2019-07-12

## 2019-07-12 DIAGNOSIS — D63.8 ANEMIA OF CHRONIC DISEASE: Primary | ICD-10-CM

## 2019-07-12 PROCEDURE — 99309 SBSQ NF CARE MODERATE MDM 30: CPT | Performed by: NURSE PRACTITIONER

## 2019-07-12 NOTE — PROGRESS NOTES
Nursing Home Follow Up Note      Jose D Pennington DO  []  NADREW Cherry  []  ANDREW Mcclain [x]  [address] [facility], [place] [address]  Phone: [phone]  Fax: [phone] Campbell Larios MD  []  Michele Kearney DO  []  [address] [place] [address]  Phone: [phone]  Fax: [phone]     PATIENT NAME: Brigette Ramirez                                                                          YOB: 1930           DATE OF SERVICE: 07/12/2019  FACILITY:   [] [place]    [] [place]    [x] [facility]     [] [facility]    [] Other ______________________________________________________________________     CHIEF COMPLAINT:  Follow-up anemia      HISTORY OF PRESENT ILLNESS:   Family is wanting to know the next step in her care given her occult stool was negative.   Family member at bedside asks about her receiving 1 unit of blood.  She also tells me she does not want her mom's anemia to have invasive work-up.  She would like her to receive B12 if possible.  No events per nursing    REVIEW OF SYSTEMS:  Patient denies pain, full review of systems unable to be obtained due to mentation and extreme hard of hearing    MEDICATIONS:  I have reviewed and reconciled the patients medication list in the patients chart at the [facility] today.      ALLERGIES:  I have reviewed allergies on file at the [facility]     SOCIAL HISTORY:  Social History     Socioeconomic History   • Marital status:      Spouse name: Not on file   • Number of children: Not on file   • Years of education: Not on file   • Highest education level: Not on file       PHYSICAL EXAMINATION:   VITAL SIGNS:   BP: 116/82, HR: 61, Resp: 16, O2 Sat: 99, Temp: 97.0, Weight: 127    Constitutional: No acute distress, sitting up in chair just received bath  HENT: NCAT, mucous membranes moist, dark coating noted on tongue  Respiratory: Clear to auscultation bilaterally, respiratory effort normal, on  supplemental O2  Cardiovascular: RRR, no murmurs, rubs, or gallops  Gastrointestinal: Positive bowel sounds, soft, nontender, nondistended  Psychiatric: Appropriate affect, cooperative  Neurologic: Follows commands, oriented to self, extremely hard of hearing    RECORDS REVIEW:   I have reviewed labs available at time of visit.  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  Hemoglobin is stable.  Looks like her baseline hemoglobin is between 9.5-10.5 when looking at her trends  --She has no overt signs of bleeding, stool Hemoccult is negative  --Discussed rationale and guidelines concerning blood transfusion  --Will check CBC on Monday per her request  --Continue iron and vitamin C replacement.  Would not check iron levels for another 2 months  --Add oral B12 supplement per family request    [x]  Discussed Patient in detail with nursing/staff, addressed all needs today.     [x]  Plan of Care Reviewed   []  PT/OT Reviewed   [x]  Order Changes  []  Discharge Plans Reviewed  [x]  Advance Directive on file with Nursing Home.   [x]  POA on file with Nursing Home.   [x]  Code Status: I have reviewed the CODE STATUS on file at the skilled nursing facility    More than 50% of time spent counseling on current illness and plan of care. Case discussed with: Daughter, nursing  Total time spent face to face with the patient was 15 minutes.  Total time of the encounter was 25 minutes.       Jocelin Moore, APRN.

## 2019-07-23 ENCOUNTER — NURSING HOME (OUTPATIENT)
Dept: FAMILY MEDICINE CLINIC | Facility: CLINIC | Age: 84
End: 2019-07-23

## 2019-07-23 DIAGNOSIS — Z78.9 IMPAIRED MOBILITY AND ADLS: Chronic | ICD-10-CM

## 2019-07-23 DIAGNOSIS — I10 ESSENTIAL HYPERTENSION: Chronic | ICD-10-CM

## 2019-07-23 DIAGNOSIS — Z74.09 IMPAIRED MOBILITY AND ADLS: Chronic | ICD-10-CM

## 2019-07-23 DIAGNOSIS — H61.21 IMPACTED CERUMEN, RIGHT EAR: Primary | ICD-10-CM

## 2019-07-23 DIAGNOSIS — J44.9 CHRONIC OBSTRUCTIVE BRONCHITIS (HCC): Chronic | ICD-10-CM

## 2019-07-23 PROCEDURE — 99309 SBSQ NF CARE MODERATE MDM 30: CPT | Performed by: NURSE PRACTITIONER

## 2019-07-23 NOTE — PROGRESS NOTES
"Nursing Home Follow Up Note      Jose D Pennington DO []   ANDREW Cherry [x]  [address] [place] [address]  Phone: [phone]  Fax: [phone] Campbell Larios MD []    Michele Kearney DO []   [address] [place] [address]  Phone: [phone]  Fax: [phone]     PATIENT NAME: Brigette Ramirez                                                                          YOB: 1930           DATE OF SERVICE: 07/23/2019  FACILITY:  [][place]   [] [place]   [x] [facility]   [] [facility]   [] Other ______________________________________________________________________      \" I personally performed the service documented in this note. The ancillary staff only assisted in transcribing the note and was not involved in any other part of rendering this service\".    CHIEF COMPLAINT:  Right ear drainage and odor      CMM and LTC needs    HISTORY OF PRESENT ILLNESS:   Per family, patient had an odorous drainage for her right ear yesterday. Staff reports that they have not seen it and it is not noted today.    RV for coordination of LTC needs and medications. Management of chronic conditions; HTN, dementia, anxiety, insomnia,  impaired mobility.    PAST MEDICAL & SURGICAL HISTORY:   Diabetes  No past surgical history on file.      MEDICATIONS:  I have reviewed and reconciled the patients medication list in the patients chart at the skilled nursing facility today.      ALLERGIES:    Allergies not on file      SOCIAL HISTORY:    Social History     Socioeconomic History   • Marital status:      Spouse name: Not on file   • Number of children: Not on file   • Years of education: Not on file   • Highest education level: Not on file       FAMILY HISTORY:    No family history on file.    REVIEW OF SYSTEMS:    Review of Systems   Constitutional: Negative for activity change, appetite change, chills, diaphoresis, fatigue, fever, unexpected weight gain and unexpected weight loss.   HENT: " Positive for ear discharge (left) and hearing loss. Negative for congestion, ear pain, mouth sores, nosebleeds, postnasal drip, rhinorrhea, sinus pressure, sneezing, sore throat and trouble swallowing.    Eyes: Negative for blurred vision, pain, discharge, redness, itching and visual disturbance.   Respiratory: Positive for cough and wheezing. Negative for apnea, choking, chest tightness, shortness of breath and stridor.    Cardiovascular: Negative for chest pain, palpitations and leg swelling.   Gastrointestinal: Negative for abdominal distention, abdominal pain, blood in stool, constipation, diarrhea, nausea, vomiting, GERD and indigestion.   Endocrine: Negative for polydipsia and polyuria.   Genitourinary: Negative for urinary incontinence, decreased urine volume, difficulty urinating, dysuria, flank pain, frequency, hematuria and urgency.   Musculoskeletal: Negative for arthralgias, back pain, gait problem, joint swelling and myalgias.   Skin: Negative for color change, dry skin, rash and skin lesions.   Allergic/Immunologic: Negative for environmental allergies.   Neurological: Positive for memory problem and confusion. Negative for dizziness, seizures, speech difficulty and weakness.   Psychiatric/Behavioral: Negative for behavioral problems, dysphoric mood, hallucinations, sleep disturbance and depressed mood. The patient is not nervous/anxious.      Exam re-performed and the findings are noted    PHYSICAL EXAMINATION:   VITAL SIGNS: BP: 133/82   HR: 62   02: 98% on 2L    RR:18     T: 97.3   Wt: 127    Physical Exam   Constitutional: She appears well-developed and well-nourished.   HENT:   Head: Normocephalic.   Right Ear: External ear normal. There is drainage (per family). cerumen impaction is present.  Left Ear: External ear normal.   Nose: Nose normal.   Eyes: Conjunctivae are normal.   Neck: Normal range of motion.   Cardiovascular: Normal rate, regular rhythm, normal heart sounds and intact distal  pulses.   Pulmonary/Chest: Effort normal. No stridor. No respiratory distress. She has decreased breath sounds in the right upper field, the right middle field, the left upper field and the left middle field. She has no wheezes. She has no rhonchi. She has no rales. She exhibits tenderness.        Abdominal: Soft. Bowel sounds are normal. She exhibits no distension and no mass. There is no tenderness. No hernia.   Musculoskeletal: She exhibits no edema.   Generalized weakness. Confined to bed or WC.   Neurological: She is alert.   Skin: Skin is warm and dry. No rash noted.   Psychiatric: She has a normal mood and affect. Her behavior is normal.   Nursing note and vitals reviewed.    Exam re-performed and the findings are noted.      RECORDS REVIEW:   I have reviewed and interpreted the following lab test results  obtained at the time of the visit today. 6/6/2019 Hgb 9.6, Hct 28.7  A1c 6.8 , Creat 0.7, BUN/Creat ration 27, Albumin 3.3    ASSESSMENT   IMPACTED CERUMEN, RIGHT EAR    ESSENTIAL HYPERTENSION    CHRONIC OBSTRUCTIVE BRONCHITIS    IMPAIRED MOBILITY      PLAN  Debrox drops to right ear, 5 gtts bid for 4 days then irrigate.     BP at goal on current medications.     Staff to continue supportive care for all ADLs.     Staff encouraged to keep me informed of any acute changes, lack of improvement, or any new concerning symptoms.    [x]  Discussed Patient in detail with nursing/staff, addressed all needs today.     [x]  Plan of Care Reviewed   []  PT/OT Reviewed   [x]  Order Changes  []  Discharge Plans Reviewed   [x]  Advance Directive on file with Nursing Home.   [x]  POA on file with Nursing Home.   [x]  Code Status listed: []  Full Code   [x]  DNR

## 2019-07-30 ENCOUNTER — NURSING HOME (OUTPATIENT)
Dept: FAMILY MEDICINE CLINIC | Facility: CLINIC | Age: 84
End: 2019-07-30

## 2019-07-30 DIAGNOSIS — H65.111 ACUTE MUCOID OTITIS MEDIA OF RIGHT EAR: ICD-10-CM

## 2019-07-30 DIAGNOSIS — I10 ESSENTIAL HYPERTENSION: Chronic | ICD-10-CM

## 2019-07-30 DIAGNOSIS — J44.9 CHRONIC OBSTRUCTIVE BRONCHITIS (HCC): Chronic | ICD-10-CM

## 2019-07-30 DIAGNOSIS — Z74.09 IMPAIRED MOBILITY AND ADLS: Chronic | ICD-10-CM

## 2019-07-30 DIAGNOSIS — Z78.9 IMPAIRED MOBILITY AND ADLS: Chronic | ICD-10-CM

## 2019-07-30 DIAGNOSIS — H91.13 PRESBYCUSIS OF BOTH EARS: ICD-10-CM

## 2019-07-30 DIAGNOSIS — H61.23 IMPACTED CERUMEN OF BOTH EARS: ICD-10-CM

## 2019-07-30 PROCEDURE — 99309 SBSQ NF CARE MODERATE MDM 30: CPT | Performed by: NURSE PRACTITIONER

## 2019-07-30 RX ORDER — METHYLPHENIDATE HYDROCHLORIDE 5 MG/1
TABLET ORAL
Qty: 60 TABLET | Refills: 0 | Status: SHIPPED | OUTPATIENT
Start: 2019-07-30 | End: 2019-10-08 | Stop reason: SDUPTHER

## 2019-07-30 NOTE — PROGRESS NOTES
"Nursing Home Follow Up Note      Jose D Pennington DO []   ANDREW Cherry [x]  [address] [place] [address]  Phone: [phone]  Fax: [phone] Campbell Larios MD []    Michele Kearney DO []   [address] [place] [address]  Phone: [phone]  Fax: [phone]     PATIENT NAME: Brigette Ramirez                                                                          YOB: 1930           DATE OF SERVICE: 07/30/2019  FACILITY:  [][facility]   [] [place]   [x] [facility]   [] [facility]   [] Other ______________________________________________________________________      \" I personally performed the service documented in this note. The ancillary staff only assisted in transcribing the note and was not involved in any other part of rendering this service\".    CHIEF COMPLAINT:  F/U Right ear drainage and cerumen impaction       HISTORY OF PRESENT ILLNESS:   Per family, patient had an odorous drainage for her right ear last week. TM visualization was inhibited due to right ear cerumen impaction. Debrox started on 7/23.      PAST MEDICAL & SURGICAL HISTORY:   Diabetes  No past surgical history on file.      MEDICATIONS:  I have reviewed and reconciled the patients medication list in the patients chart at the skilled nursing facility today.      ALLERGIES:    Allergies not on file      SOCIAL HISTORY:    Social History     Socioeconomic History   • Marital status:      Spouse name: Not on file   • Number of children: Not on file   • Years of education: Not on file   • Highest education level: Not on file       FAMILY HISTORY:    No family history on file.    REVIEW OF SYSTEMS:    Review of Systems   Constitutional: Negative for activity change, appetite change, chills, diaphoresis, fatigue, fever, unexpected weight gain and unexpected weight loss.   HENT: Positive for ear discharge (right) and hearing loss. Negative for congestion, ear pain, mouth sores, nosebleeds, postnasal " drip, rhinorrhea, sinus pressure, sneezing, sore throat and trouble swallowing.    Eyes: Negative for blurred vision, pain, discharge, redness, itching and visual disturbance.   Respiratory: Negative for apnea, cough, choking, chest tightness, shortness of breath, wheezing and stridor.    Cardiovascular: Negative for chest pain, palpitations and leg swelling.   Gastrointestinal: Negative for abdominal distention, abdominal pain, blood in stool, constipation, diarrhea, nausea, vomiting, GERD and indigestion.   Endocrine: Negative for polydipsia and polyuria.   Genitourinary: Negative for urinary incontinence, decreased urine volume, difficulty urinating, dysuria, flank pain, frequency, hematuria and urgency.   Musculoskeletal: Negative for arthralgias, back pain, gait problem, joint swelling and myalgias.   Skin: Negative for color change, dry skin, rash and skin lesions.   Allergic/Immunologic: Negative for environmental allergies.   Neurological: Positive for memory problem and confusion. Negative for dizziness, seizures, speech difficulty and weakness.   Psychiatric/Behavioral: Negative for behavioral problems, dysphoric mood, hallucinations, sleep disturbance and depressed mood. The patient is not nervous/anxious.      Exam re-performed and the findings are noted    PHYSICAL EXAMINATION:   VITAL SIGNS: BP: 106/70   HR: 64   02: 96% on 2L    RR:18     T: 98.1   Wt: 127    Physical Exam   Constitutional: She appears well-developed and well-nourished.   HENT:   Head: Normocephalic.   Right Ear: External ear normal. There is drainage (per family) and tenderness. Tympanic membrane is erythematous and bulging. cerumen impaction is present.  Left Ear: External ear normal. An impacted cerumen is present.  Nose: Nose normal.   Mucoid drainage present Right ear   Eyes: Conjunctivae are normal.   Neck: Normal range of motion.   Cardiovascular: Normal rate, regular rhythm, normal heart sounds and intact distal pulses.    Pulmonary/Chest: Effort normal. No stridor. No respiratory distress. She has decreased breath sounds. She has no wheezes. She has no rhonchi. She has no rales. She exhibits no tenderness.        Abdominal: Soft. Bowel sounds are normal. She exhibits no distension and no mass. There is no tenderness. No hernia.   Musculoskeletal: She exhibits no edema.   Generalized weakness. Confined to bed or WC.   Neurological: She is alert.   Skin: Skin is warm and dry. No rash noted.   Psychiatric: She has a normal mood and affect. Her behavior is normal.   Nursing note and vitals reviewed.    Exam re-performed and the findings are noted.      RECORDS REVIEW:   I have reviewed and interpreted the following lab test results : 7/15/19  Hgb 9.0   Hct 27.2   6/6/2019 A1c 6.8 , Creat 0.7, BUN/Creat ratio 27, Albumin 3.3, GFR 79    ASSESSMENT   IMPACTED CERUMEN OF BOTH EARS    ACUTE MUCOID OTITIS MEDIA OF RIGHT EAR    PRESBYCUSIS OF BOTH EARS    ESSENTIAL HYPERTENSION    CHRONIC OBSTRUCTIVE BRONCHITIS    IMPAIRED MOBILITY      PLAN    Right canal still with 3/4 cerumen impaction. Mucoid drainage noted in open canal. Left canal with complete cerumen impaction. Debrox drops ordered to bilateral ears, 5 gtts bid for 4 days then staff to flush bilateral ears to irrigate.    Start Levaquin 500mg once, on day one, then 250mg daily for 9 days, for mucoid otitis. Cr Cl 44.     BP at goal on current medications.     Chronic obstructive bronchitis well controlled with current oxygen supplementation and plan of care.  No acute exacerbations. Will continue to monitor.    Staff to continue supportive care for all ADLs.     Staff encouraged to keep me informed of any acute changes, lack of improvement, or any new concerning symptoms.    [x]  Discussed Patient in detail with nursing/staff, addressed all needs today.     [x]  Plan of Care Reviewed   []  PT/OT Reviewed   [x]  Order Changes  []  Discharge Plans Reviewed   [x]  Advance Directive  on file with Nursing Home.   [x]  POA on file with Nursing Home.   [x]  Code Status listed: []  Full Code   [x]  DNR

## 2019-08-02 ENCOUNTER — NURSING HOME (OUTPATIENT)
Dept: FAMILY MEDICINE CLINIC | Facility: CLINIC | Age: 84
End: 2019-08-02

## 2019-08-02 DIAGNOSIS — H65.111 ACUTE MUCOID OTITIS MEDIA OF RIGHT EAR: Primary | ICD-10-CM

## 2019-08-02 PROCEDURE — 99308 SBSQ NF CARE LOW MDM 20: CPT | Performed by: NURSE PRACTITIONER

## 2019-08-02 NOTE — PROGRESS NOTES
Nursing Home Follow Up Note      Jose D Pennington DO  []  ANDREW Cherry  []  ANDREW Mcclain [x]  [address] [facility], [place] [address]  Phone: [phone]  Fax: [phone] Campbell Larios MD  []  Michele Kearney DO  []  [address] [place] [address]  Phone: [phone]  Fax: [phone]     PATIENT NAME: Brigette Ramirez                                                                          YOB: 1930           DATE OF SERVICE: 08/02/2019  FACILITY:   [] [place]    [] [place]    [x] [facility]     [] [facility]    [] Other ______________________________________________________________________     CHIEF COMPLAINT:  Follow-up otitis media    HISTORY OF PRESENT ILLNESS:   Daughter reports ear drainage and foul odor have subsided.  Patient denies ear pain    REVIEW OF SYSTEMS:  Review of systems unable to be obtained due to patient's mentation and extreme hard of hearing    PAST MEDICAL & SURGICAL HISTORY:   No past medical history on file.   No past surgical history on file.      MEDICATIONS:  I have reviewed and reconciled the patients medication list in the patients chart at the [facility] today.      ALLERGIES:  I have reviewed allergies on file at the [facility]  Allergies not on file      SOCIAL HISTORY:  Social History     Socioeconomic History   • Marital status:      Spouse name: Not on file   • Number of children: Not on file   • Years of education: Not on file   • Highest education level: Not on file       PHYSICAL EXAMINATION:   VITAL SIGNS:   BP: 118/64, HR: 66, Resp: 18, O2 Sat: 96, Temp: 97.2, Weight: 128    Constitutional: No acute distress, awake, alert, sitting up chair eating lunch with daughter  HENT: NCAT, mucous membranes moist, right ear examined with my cell phone light as there is no otoscope available. 3/4 canal impacted with cerumen. No drainage or odor  Respiratory: Clear to auscultation bilaterally, respiratory  effort normal   Cardiovascular: RRR, no murmurs, rubs, or gallops  Gastrointestinal: Positive bowel sounds, soft, nontender, nondistended  Psychiatric: Appropriate affect, cooperative  Neurologic: alert, very [place],     RECORDS REVIEW:   I have reviewed labs available at time of visit.    ASSESSMENT     Diagnoses and all orders for this visit:    Acute mucoid otitis media of right ear        PLAN  --Limited assessment due to lack of otoscope, however some improvement reported per daughter  --Continue current plan of Supa    []  Discussed Patient in detail with nursing/staff, addressed all needs today.     [x]  Plan of Care Reviewed   []  PT/OT Reviewed   []  Order Changes  []  Discharge Plans Reviewed  [x]  Advance Directive on file with Nursing Home.   [x]  POA on file with Nursing Home.   [x]  Code Status: I have reviewed the CODE STATUS on file at the skilled nursing facility       ANDREW Mcclain.

## 2019-08-19 ENCOUNTER — NURSING HOME (OUTPATIENT)
Dept: FAMILY MEDICINE CLINIC | Facility: CLINIC | Age: 84
End: 2019-08-19

## 2019-08-19 DIAGNOSIS — J44.1 CHRONIC OBSTRUCTIVE PULMONARY DISEASE WITH ACUTE EXACERBATION (HCC): ICD-10-CM

## 2019-08-19 DIAGNOSIS — Z74.09 IMPAIRED MOBILITY AND ADLS: Chronic | ICD-10-CM

## 2019-08-19 DIAGNOSIS — Z78.9 IMPAIRED MOBILITY AND ADLS: Chronic | ICD-10-CM

## 2019-08-19 DIAGNOSIS — I10 ESSENTIAL HYPERTENSION: Chronic | ICD-10-CM

## 2019-08-19 PROBLEM — H61.23 IMPACTED CERUMEN OF BOTH EARS: Status: RESOLVED | Noted: 2019-07-30 | Resolved: 2019-08-19

## 2019-08-19 PROCEDURE — 99309 SBSQ NF CARE MODERATE MDM 30: CPT | Performed by: NURSE PRACTITIONER

## 2019-08-20 NOTE — PROGRESS NOTES
"Nursing Home Follow Up Note      Jose D Pennington DO []   ANDREW Cherry [x]  [address] [place] [address]  Phone: [phone]  Fax: [phone] Campbell Larios MD []    Michele Kearney DO []   [address] [place] [address]  Phone: [phone]  Fax: [phone]     PATIENT NAME: Brigette Ramirez                                                                          YOB: 1930           DATE OF SERVICE: 08/19/2019  FACILITY:  [][place]   [] [place]   [x] [facility]   [] [facility]   [] Other ______________________________________________________________________      \" I personally performed the service documented in this note. The ancillary staff only assisted in transcribing the note and was not involved in any other part of rendering this service\".    CHIEF COMPLAINT:  Patient coughing up discolored sputum, per family       HISTORY OF PRESENT ILLNESS:   Per family, patient has had a productive cough since yesterday and has been coughing up, \"brown colored\", sputum.      PAST MEDICAL & SURGICAL HISTORY:   Diabetes  No past surgical history on file.      MEDICATIONS:  I have reviewed and reconciled the patients medication list in the patients chart at the skilled nursing facility today.      ALLERGIES:    Allergies not on file      SOCIAL HISTORY:    Social History     Socioeconomic History   • Marital status:      Spouse name: Not on file   • Number of children: Not on file   • Years of education: Not on file   • Highest education level: Not on file       FAMILY HISTORY:    No family history on file.    REVIEW OF SYSTEMS:    Review of Systems   Constitutional: Negative for activity change, appetite change, chills, diaphoresis, fatigue, fever, unexpected weight gain and unexpected weight loss.   HENT: Positive for hearing loss. Negative for congestion, ear discharge, ear pain, mouth sores, nosebleeds, postnasal drip, rhinorrhea, sinus pressure, sneezing, sore throat, " swollen glands and trouble swallowing.    Eyes: Negative for blurred vision, pain, discharge, redness, itching and visual disturbance.   Respiratory: Positive for cough. Negative for apnea, choking, chest tightness, shortness of breath, wheezing and stridor.    Cardiovascular: Negative for chest pain, palpitations and leg swelling.   Gastrointestinal: Negative for abdominal distention, abdominal pain, blood in stool, constipation, diarrhea, nausea, vomiting, GERD and indigestion.   Endocrine: Negative for polydipsia and polyuria.   Genitourinary: Negative for decreased urine volume, difficulty urinating, dysuria, flank pain, frequency, hematuria, urgency and urinary incontinence.   Musculoskeletal: Negative for arthralgias, back pain, gait problem, joint swelling and myalgias.   Skin: Negative for color change, dry skin, rash and skin lesions.   Allergic/Immunologic: Negative for environmental allergies.   Neurological: Positive for memory problem and confusion. Negative for dizziness, seizures, speech difficulty and weakness.   Psychiatric/Behavioral: Negative for behavioral problems, dysphoric mood, hallucinations, sleep disturbance and depressed mood. The patient is not nervous/anxious.      Exam re-performed and the findings are noted    PHYSICAL EXAMINATION:   VITAL SIGNS: BP: 106/70   HR: 64   02: 96% on 2L    RR:18     T: 98.1   Wt: 127    Physical Exam   Constitutional: She appears well-developed and well-nourished.   HENT:   Head: Normocephalic.   Right Ear: External ear normal. There is drainage (per family) and tenderness. Tympanic membrane is erythematous and bulging. cerumen impaction is present.  Left Ear: External ear normal. An impacted cerumen is present.  Nose: Nose normal.   Mucoid drainage present Right ear   Eyes: Conjunctivae are normal.   Neck: Normal range of motion.   Cardiovascular: Normal rate, regular rhythm, normal heart sounds and intact distal pulses.   Pulmonary/Chest: Effort normal.  No stridor. No respiratory distress. She has decreased breath sounds. She has no wheezes. She has no rhonchi. She has no rales. She exhibits no tenderness.        Abdominal: Soft. Bowel sounds are normal. She exhibits no distension and no mass. There is no tenderness. No hernia.   Musculoskeletal: She exhibits no edema.   Generalized weakness. Confined to bed or WC.   Neurological: She is alert.   Skin: Skin is warm and dry. No rash noted.   Psychiatric: She has a normal mood and affect. Her behavior is normal.   Nursing note and vitals reviewed.    Exam re-performed and the findings are noted.      RECORDS REVIEW:   I have reviewed and interpreted the following lab test results : 7/15/19  Hgb 9.0   Hct 27.2   6/6/2019 A1c 6.8 , Creat 0.7, BUN/Creat ratio 27, Albumin 3.3, GFR 79    ASSESSMENT     CHRONIC OBSTRUCTIVE BRONCHITIS WITH ACUTE BRONCHITIS    HYPERTENSION    IMPAIRED MOBILITY      PLAN    Patient with symptoms of acute COPD exacerbation. Check CXR to r/o pneumonia.     BP at goal on current medications.     Staff to continue supportive care for all ADLs.     Staff encouraged to keep me informed of any acute changes, lack of improvement, or any new concerning symptoms.    [x]  Discussed Patient in detail with nursing/staff, addressed all needs today.     [x]  Plan of Care Reviewed   []  PT/OT Reviewed   [x]  Order Changes  []  Discharge Plans Reviewed   [x]  Advance Directive on file with Nursing Home.   [x]  POA on file with Nursing Home.   [x]  Code Status listed: []  Full Code   [x]  DNR

## 2019-09-04 ENCOUNTER — NURSING HOME (OUTPATIENT)
Dept: FAMILY MEDICINE CLINIC | Facility: CLINIC | Age: 84
End: 2019-09-04

## 2019-09-04 DIAGNOSIS — F01.50 VASCULAR DEMENTIA WITHOUT BEHAVIORAL DISTURBANCE (HCC): Chronic | ICD-10-CM

## 2019-09-04 DIAGNOSIS — R54 AGE-RELATED PHYSICAL DEBILITY: Chronic | ICD-10-CM

## 2019-09-04 DIAGNOSIS — J44.9 CHRONIC OBSTRUCTIVE BRONCHITIS (HCC): Chronic | ICD-10-CM

## 2019-09-04 DIAGNOSIS — K21.9 GERD WITHOUT ESOPHAGITIS: Chronic | ICD-10-CM

## 2019-09-04 DIAGNOSIS — Z78.9 IMPAIRED MOBILITY AND ADLS: Primary | Chronic | ICD-10-CM

## 2019-09-04 DIAGNOSIS — Z74.09 IMPAIRED MOBILITY AND ADLS: Primary | Chronic | ICD-10-CM

## 2019-09-04 DIAGNOSIS — E11.65 UNCONTROLLED TYPE 2 DIABETES MELLITUS WITH HYPERGLYCEMIA (HCC): ICD-10-CM

## 2019-09-04 DIAGNOSIS — D63.8 ANEMIA OF CHRONIC DISEASE: ICD-10-CM

## 2019-09-04 DIAGNOSIS — J96.11 CHRONIC RESPIRATORY FAILURE WITH HYPOXIA (HCC): Chronic | ICD-10-CM

## 2019-09-04 DIAGNOSIS — I10 ESSENTIAL HYPERTENSION: Chronic | ICD-10-CM

## 2019-09-04 PROCEDURE — 99309 SBSQ NF CARE MODERATE MDM 30: CPT | Performed by: FAMILY MEDICINE

## 2019-09-06 NOTE — PROGRESS NOTES
Nursing Home Progress Note      Patient Name: Brigette Ramirez   YOB: 1930    Date of Service: 9/4/2019    Jose D Pennington, DO [x]  ANDREW Cherry ?  [address] [place] [address]  Phone: [phone]  Fax: [phone] Campbell Larios MD ?  Michele Kearney, DO ?  [address] [place] [address]  Phone: [phone]  Fax: [phone]       Facility: [place] []   [place] []   [facility] [x]   [facility] []   Other []       CHIEF COMPLAINT:  CMM/LTC     HISTORY OF PRESENT ILLNESS:  [x]  Follow Up visit for coordination of long term care issues and chronic medical management needs.    Vascular dementia/diabetes mellitus/GERD/chronic respiratory failure with hypoxia/impaired mobility and ADLs/ ypertension/age related physcal debility    PAST MEDICAL & SURGICAL HISTORY:  No past medical history on file.   No past surgical history on file.     MEDICATIONS:  Medication administration record for Brigette Ramirez reviewed and reconciled today.    ALLERGIES:  Allergies not on file     REVIEW OF SYSTEMS:  • Appetite: Fair []   Good [x]   Poor []   Weight Loss []  [x]  Weight Stable   Unavoidable Weight Loss []  Tolerating Tube Feeding []    Supplements Provided []   • Constitutional: Negative for fever, chills, diaphoresis or fatigue and weight change.   • HENT: No dysphagia; no new changes to vision/hearing/smell/taste; no epistaxis  • Eyes: Negative for redness and visual disturbance.   • Respiratory: Chronic upper airway congestion, denies any hemoptysis.  • Cardiovascular: Negative for chest pain and palpitations.   • Gastrointestinal: Negative for abdominal distention, abdominal pain and blood in stool.   • Endocrine: Negative for cold intolerance and heat intolerance.   • Genitourinary: Mild dysuria, without hematuria.Negative for hematuria   • Musculoskeletal: Chronic myalgias and arthralgias  • Integumentary: No open wounds, rash or concerning skin lesions  • Neurological: Negative  for syncope, weakness and headaches.   • Hematological: Negative for adenopathy. Does not bruise/bleed easily.   • Immunological: Negative for reported allergies or immunological disorders  • Psychological: No depression, anxiety or suicidal ideation    PHYSICAL EXAMINATION:  VITAL SIGNS: /78, HR 68 bpm, RR 18, weight 123    General Appearance:  [x]  Alert   [x]  Oriented x person  [x]  No acute distress    [x]  Confused  []  Disoriented   []  Comatose   Head:  Atraumatic and normocephalic, without obvious abnormality.   Eyes:          PERRLA, conjunctivae and sclerae normal, no Icterus. No pallor. Extra-occular movements are within normal limits.   Ears:  Ears appear intact with no abnormalities noted.   Throat: No oral lesions, no thrush, oral mucosa moist.   Neck: Supple, trachea midline, no thyromegaly, no carotid bruit.   Back:   No kyphoscoliosis. No tenderness to palpation.   Lungs:   Chest shape is normal.  Audible air exchange noted all lung fields.    Trace end-expiratory wheezing.  Rhonchus, referred upper congestion that is cleared with light cough.     Heart:  Normal S1 and S2, no murmur, no gallop, no rub. No JVD.   Abdomen:   Normal bowel sounds, no masses, no organomegaly. Soft, non-tender, non-distended, no guarding .  No CVA tenderness.     Extremities: Chronic frail stature, edema, cyanosis or clubbing.  Frail build.    Pulses: Pulses palpable and equal bilaterally.   Skin: No bleeding or rash.  Generalized dry skin noted.  Age-related atrophy of skin.   Neurologic: [x] Normal speech []  Normal mental status    [x] Cranial nerves II through XII intact   [x]  No anosmia [x]  DTR 2+ [x]  Proprioception intact  [x]  No focal motor/sensory deficits     Psych/Mood:        [x]  No acute changes []  Depressed  Urinary:        []  Continent  [x]  Incontinent  []  Retention  []  F/C     []  UTI w/treatment in progress  RECORD REVIEW:   • Consult notes []   • Admission and subsequent notes []   •  [x]  I have personally reviewed and interpreted available lab data, radiology studies and ECG obtained at time of visit.  [x] Medication Review: I have reviewed the patients active and prn medications at [facility]     ASSESSMENT   Diagnoses and all orders for this visit:    Impaired mobility and ADLs    Age-related physical debility    Vascular dementia without behavioral disturbance    Anemia of chronic disease with mixed iron deficiency    GERD without esophagitis    Chronic obstructive bronchitis (CMS/HCC)    Chronic respiratory failure with hypoxia (CMS/[facility])    Essential hypertension    Uncontrolled type 2 diabetes mellitus with hyperglycemia (CMS/[facility])        PLAN  Continuation of supportive care for ADLs assistance, and all mobilization/transfers needs.  Demonstrates no increased work of breathing, nor oxygen supplementation need.  BP is at goal, continues to demonstrate hemodynamic stability on review of vital signs.  No reports of any cyclical/persistent hypoglycemia.  Continue dietary modifications to assist in blood glucose control including avoidance of prolonged fasting periods and adequate hydration status as best able.  She has a significant history of being quite brittle with respect to changes of her insulin regimen.  No acute behavioral changes  reported from staff.  Continue surveillance labs.  Relative weight stability noted, appetite remains good.    [x]  Discussed Patient in detail with nursing/staff, addressed all needs today.     [x]  Plan of Care Reviewed   []   PT/OT Reviewed   []  Order Changes  []   Discharge Plans Reviewed         Jose D Pennington  9/4/2019

## 2019-09-25 ENCOUNTER — NURSING HOME (OUTPATIENT)
Dept: FAMILY MEDICINE CLINIC | Facility: CLINIC | Age: 84
End: 2019-09-25

## 2019-09-25 DIAGNOSIS — I10 ESSENTIAL HYPERTENSION: Chronic | ICD-10-CM

## 2019-09-25 DIAGNOSIS — J44.9 CHRONIC OBSTRUCTIVE BRONCHITIS (HCC): Chronic | ICD-10-CM

## 2019-09-25 DIAGNOSIS — E11.9 TYPE 2 DIABETES MELLITUS TREATED WITH INSULIN (HCC): Chronic | ICD-10-CM

## 2019-09-25 DIAGNOSIS — Z74.09 IMPAIRED MOBILITY AND ADLS: Primary | Chronic | ICD-10-CM

## 2019-09-25 DIAGNOSIS — Z79.4 TYPE 2 DIABETES MELLITUS TREATED WITH INSULIN (HCC): Chronic | ICD-10-CM

## 2019-09-25 DIAGNOSIS — K21.9 GERD WITHOUT ESOPHAGITIS: Chronic | ICD-10-CM

## 2019-09-25 DIAGNOSIS — F01.50 VASCULAR DEMENTIA WITHOUT BEHAVIORAL DISTURBANCE (HCC): Chronic | ICD-10-CM

## 2019-09-25 DIAGNOSIS — D63.8 ANEMIA OF CHRONIC DISEASE: ICD-10-CM

## 2019-09-25 DIAGNOSIS — R54 AGE-RELATED PHYSICAL DEBILITY: Chronic | ICD-10-CM

## 2019-09-25 DIAGNOSIS — J96.11 CHRONIC RESPIRATORY FAILURE WITH HYPOXIA (HCC): Chronic | ICD-10-CM

## 2019-09-25 DIAGNOSIS — Z78.9 IMPAIRED MOBILITY AND ADLS: Primary | Chronic | ICD-10-CM

## 2019-09-25 PROCEDURE — 99309 SBSQ NF CARE MODERATE MDM 30: CPT | Performed by: FAMILY MEDICINE

## 2019-10-02 PROBLEM — Z79.4 TYPE 2 DIABETES MELLITUS TREATED WITH INSULIN (HCC): Chronic | Status: ACTIVE | Noted: 2019-10-02

## 2019-10-02 PROBLEM — E11.9 TYPE 2 DIABETES MELLITUS TREATED WITH INSULIN (HCC): Chronic | Status: ACTIVE | Noted: 2019-10-02

## 2019-10-02 PROBLEM — E11.65 UNCONTROLLED TYPE 2 DIABETES MELLITUS WITH HYPERGLYCEMIA (HCC): Status: RESOLVED | Noted: 2018-07-10 | Resolved: 2019-10-02

## 2019-10-02 NOTE — PROGRESS NOTES
Nursing Home Progress Note      Patient Name: Brigette Ramirez   YOB: 1930    Date of Service: 9/25/2019    Jose D Pennington, DO [x]  ANDREW Cherry ?  [address] [place] [address]  Phone: [phone]  Fax: [phone] Campbell Larios MD ?  Michele Kearney, DO ?  [address] [place] [address]  Phone: [phone]  Fax: [phone]       Facility: [place] []   [place] []   [facility] [x]   [facility] []   Other []       CHIEF COMPLAINT:  CMM/LTC     HISTORY OF PRESENT ILLNESS:  [x]  Follow Up visit for coordination of long term care issues and chronic medical management needs.    Vascular dementia/diabetes mellitus/GERD/chronic respiratory failure with hypoxia/impaired mobility and ADLs/ ypertension/age related physcal debility    PAST MEDICAL & SURGICAL HISTORY:  No past medical history on file.   No past surgical history on file.     MEDICATIONS:  Medication administration record for Brigette Ramirez reviewed and reconciled today.    ALLERGIES:  Allergies not on file     REVIEW OF SYSTEMS:  • Appetite: Fair []   Good [x]   Poor []   Weight Loss []  [x]  Weight Stable   Unavoidable Weight Loss []  Tolerating Tube Feeding []    Supplements Provided []   • Constitutional: Negative for fever, chills, diaphoresis or fatigue and weight change.   • HENT: No dysphagia; no new changes to vision/hearing/smell/taste; no epistaxis  • Eyes: Negative for redness and visual disturbance.   • Respiratory: Chronic upper airway congestion, denies any hemoptysis.  • Cardiovascular: Negative for chest pain and palpitations.   • Gastrointestinal: Negative for abdominal distention, abdominal pain and blood in stool.   • Endocrine: Negative for cold intolerance and heat intolerance.   • Genitourinary: Mild dysuria, without hematuria.Negative for hematuria   • Musculoskeletal: Chronic myalgias and arthralgias  • Integumentary: No open wounds, rash or concerning skin lesions  • Neurological: Negative  for syncope, weakness and headaches.   • Hematological: Negative for adenopathy. Does not bruise/bleed easily.   • Immunological: Negative for reported allergies or immunological disorders  • Psychological: No depression, anxiety or suicidal ideation    PHYSICAL EXAMINATION:  VITAL SIGNS: /82, HR 70 bpm, RR 18, weight 123    General Appearance:  [x]  Alert   [x]  Oriented x person  [x]  No acute distress    [x]  Confused  []  Disoriented   []  Comatose   Head:  Atraumatic and normocephalic, without obvious abnormality.   Eyes:          PERRLA, conjunctivae and sclerae normal, no Icterus. No pallor. Extra-occular movements are within normal limits.   Ears:  Ears appear intact with no abnormalities noted.   Throat: No oral lesions, no thrush, oral mucosa moist.   Neck: Supple, trachea midline, no thyromegaly, no carotid bruit.   Back:   No kyphoscoliosis. No tenderness to palpation.   Lungs:   Chest shape is normal.  Audible air exchange noted all lung fields.    Trace end-expiratory wheezing.  Rhonchus, referred upper congestion that is cleared with light cough.     Heart:  Normal S1 and S2, no murmur, no gallop, no rub. No JVD.   Abdomen:   Normal bowel sounds, no masses, no organomegaly. Soft, non-tender, non-distended, no guarding .  No CVA tenderness.     Extremities: Chronic frail stature, edema, cyanosis or clubbing.  Frail build.    Pulses: Pulses palpable and equal bilaterally.   Skin: No bleeding or rash.  Generalized dry skin noted.  Age-related atrophy of skin.   Neurologic: [x] Normal speech []  Normal mental status    [x] Cranial nerves II through XII intact   [x]  No anosmia [x]  DTR 2+ [x]  Proprioception intact  [x]  No focal motor/sensory deficits     Psych/Mood:        [x]  No acute changes []  Depressed  Urinary:        []  Continent  [x]  Incontinent  []  Retention  []  F/C     []  UTI w/treatment in progress  RECORD REVIEW:   • Consult notes []   • Admission and subsequent notes []   •  [x]  I have personally reviewed and interpreted available lab data, radiology studies and ECG obtained at time of visit.  [x] Medication Review: I have reviewed the patients active and prn medications at [facility]     ASSESSMENT   Diagnoses and all orders for this visit:    Impaired mobility and ADLs    Chronic respiratory failure with hypoxia (CMS/[facility])    Essential hypertension    Anemia of chronic disease with mixed iron deficiency    Age-related physical debility    GERD without esophagitis    Chronic obstructive bronchitis (CMS/[facility])    Vascular dementia without behavioral disturbance (CMS/[facility])    Type 2 diabetes mellitus treated with insulin (CMS/[facility])        PLAN  Supportive care for ADLs assistance, and all mobilization/transfers needs.  She is without increased work of breathing, nor oxygen supplementation need at the time of my exam.  BP remains at goal, continues to demonstrate hemodynamic stability clinically.  No reports of any cyclical/persistent hypoglycemia.  Continue currently established dietary modifications to assist in blood glucose control including avoidance of prolonged fasting periods and adequate hydration status.  Significant history of being quite brittle with respect to changes of her insulin regimen.  No acute behavioral changes reported from nursing/staff.  Routine surveillance labs when needed.  Weight stability noted, appetite remains good.    [x]  Discussed Patient in detail with nursing/staff, addressed all needs today.     [x]  Plan of Care Reviewed   []   PT/OT Reviewed   []  Order Changes  []   Discharge Plans Reviewed         Jose D Pennington  9/25/2019

## 2019-10-08 RX ORDER — METHYLPHENIDATE HYDROCHLORIDE 5 MG/1
TABLET ORAL
Qty: 60 TABLET | Refills: 0 | Status: SHIPPED | OUTPATIENT
Start: 2019-10-08 | End: 2019-12-05 | Stop reason: SDUPTHER

## 2019-10-10 ENCOUNTER — NURSING HOME (OUTPATIENT)
Dept: FAMILY MEDICINE CLINIC | Facility: CLINIC | Age: 84
End: 2019-10-10

## 2019-10-10 DIAGNOSIS — I10 ESSENTIAL HYPERTENSION: ICD-10-CM

## 2019-10-10 DIAGNOSIS — Z79.4 TYPE 2 DIABETES MELLITUS TREATED WITH INSULIN (HCC): ICD-10-CM

## 2019-10-10 DIAGNOSIS — J96.11 CHRONIC RESPIRATORY FAILURE WITH HYPOXIA (HCC): ICD-10-CM

## 2019-10-10 DIAGNOSIS — J44.9 CHRONIC OBSTRUCTIVE BRONCHITIS (HCC): ICD-10-CM

## 2019-10-10 DIAGNOSIS — E11.9 TYPE 2 DIABETES MELLITUS TREATED WITH INSULIN (HCC): ICD-10-CM

## 2019-10-10 DIAGNOSIS — D63.8 ANEMIA OF CHRONIC DISEASE: ICD-10-CM

## 2019-10-10 DIAGNOSIS — F01.50 VASCULAR DEMENTIA WITHOUT BEHAVIORAL DISTURBANCE (HCC): Primary | ICD-10-CM

## 2019-10-10 DIAGNOSIS — Z74.09 IMPAIRED MOBILITY AND ADLS: ICD-10-CM

## 2019-10-10 DIAGNOSIS — K21.9 GERD WITHOUT ESOPHAGITIS: ICD-10-CM

## 2019-10-10 DIAGNOSIS — Z78.9 IMPAIRED MOBILITY AND ADLS: ICD-10-CM

## 2019-10-10 PROCEDURE — 99308 SBSQ NF CARE LOW MDM 20: CPT | Performed by: NURSE PRACTITIONER

## 2019-10-10 NOTE — PROGRESS NOTES
Nursing Home Follow Up Note      Jose D Pennington DO  []  ANDREW Cherry  []  ANDREW Mcclain [x]  [address] [facility] [address] [facility], [place] [address]  Phone: [phone]  Fax: [phone] Campbell Larios MD  []  Michele Kearney DO  []  [address] [place] [address]  Phone: [phone]  Fax: [phone]     PATIENT NAME: Brigette Ramirez                                                                          YOB: 1930           DATE OF SERVICE: 10-10-19  FACILITY:   [] [facility]    [] [facility]    [x] [facility]     [] [facility]    [] Other ______________________________________________________________________     CHIEF COMPLAINT:  F/u COPD, DM, Dementia, anemia, COPD, HTN      HISTORY OF PRESENT ILLNESS:   Seen for CMM and long term care needs. No events per nursing. Patient denies pain    REVIEW OF SYSTEMS:  Full ROS unable to be obtained due to mentation    PAST MEDICAL & SURGICAL HISTORY:   No past medical history on file.   No past surgical history on file.      MEDICATIONS:  I have reviewed and reconciled the patients medication list in the patients chart at the [facility] today.      ALLERGIES:  I have reviewed allergies on file at the [facility]  Allergies not on file      SOCIAL HISTORY:  Social History     Socioeconomic History   • Marital status:      Spouse name: Not on file   • Number of children: Not on file   • Years of education: Not on file   • Highest education level: Not on file       PHYSICAL EXAMINATION:   VITAL SIGNS:   BP: 164/93, HR: 68, Resp: 18, O2 Sat: 97, Temp: 96.2, Weight: 124    Constitutional: No acute distress, awake, alert being fed by staff  HENT: NCAT, mucous membranes moist  Respiratory: Clear to auscultation bilaterally, respiratory effort normal, wearing supplemental oxygen  Cardiovascular: RRR, no murmurs, rubs, or gallops  Gastrointestinal: Positive bowel sounds, soft, nontender, nondistended  Musculoskeletal: No  bilateral ankle edema, PPP  Psychiatric: Flat affect, cooperative  Neurologic: alert and interactive, speech clear, very hard of hearing  Skin: No rashes    RECORDS REVIEW:   I have reviewed labs available at time of visit    ASSESSMENT     Diagnoses and all orders for this visit:    Vascular dementia without behavioral disturbance (CMS/[facility])    Essential hypertension    Chronic obstructive bronchitis (CMS/[facility])    GERD without esophagitis    Impaired mobility and ADLs    Anemia of chronic disease with mixed iron deficiency    Chronic respiratory failure with hypoxia (CMS/[facility])    Type 2 diabetes mellitus treated with insulin (CMS/[facility])        PLAN  --Dementia stable without behavior disturbance  --Continue PPI  --History of iron deficiency anemia, hemoglobin stable, continue replacement  --Continue chronic supplemental O2, continue nebs scheduled  --Blood pressure controlled on ACE inhibitor and beta-blocker  --Glucose trends reviewed.  Glucose controlled on current basal/bolus regimen, continue diabetic diet  -- staff to continue to assist with ADL's, medications, and overall care    [x]  Discussed Patient in detail with nursing/staff, addressed all needs today.     [x]  Plan of Care Reviewed   []  PT/OT Reviewed   []  Order Changes  []  Discharge Plans Reviewed  [x]  Advance Directive on file with Nursing Home.   [x]  POA on file with Nursing Home.   [x]  Code Status: I have reviewed the CODE STATUS on file at the skilled nursing facility       ANDREW Mcclain.

## 2019-10-23 ENCOUNTER — NURSING HOME (OUTPATIENT)
Dept: FAMILY MEDICINE CLINIC | Facility: CLINIC | Age: 84
End: 2019-10-23

## 2019-10-23 DIAGNOSIS — J96.11 CHRONIC RESPIRATORY FAILURE WITH HYPOXIA (HCC): Chronic | ICD-10-CM

## 2019-10-23 DIAGNOSIS — I10 ESSENTIAL HYPERTENSION: Chronic | ICD-10-CM

## 2019-10-23 DIAGNOSIS — Z79.4 TYPE 2 DIABETES MELLITUS TREATED WITH INSULIN (HCC): Chronic | ICD-10-CM

## 2019-10-23 DIAGNOSIS — Z78.9 IMPAIRED MOBILITY AND ADLS: Primary | Chronic | ICD-10-CM

## 2019-10-23 DIAGNOSIS — Z74.09 IMPAIRED MOBILITY AND ADLS: Primary | Chronic | ICD-10-CM

## 2019-10-23 DIAGNOSIS — K21.9 GERD WITHOUT ESOPHAGITIS: Chronic | ICD-10-CM

## 2019-10-23 DIAGNOSIS — E11.9 TYPE 2 DIABETES MELLITUS TREATED WITH INSULIN (HCC): Chronic | ICD-10-CM

## 2019-10-23 DIAGNOSIS — D63.8 ANEMIA OF CHRONIC DISEASE: ICD-10-CM

## 2019-10-23 DIAGNOSIS — F01.50 VASCULAR DEMENTIA WITHOUT BEHAVIORAL DISTURBANCE (HCC): Chronic | ICD-10-CM

## 2019-10-23 DIAGNOSIS — J44.9 CHRONIC OBSTRUCTIVE BRONCHITIS (HCC): Chronic | ICD-10-CM

## 2019-10-23 DIAGNOSIS — R54 AGE-RELATED PHYSICAL DEBILITY: Chronic | ICD-10-CM

## 2019-10-23 PROCEDURE — 99309 SBSQ NF CARE MODERATE MDM 30: CPT | Performed by: FAMILY MEDICINE

## 2019-11-14 VITALS
SYSTOLIC BLOOD PRESSURE: 164 MMHG | TEMPERATURE: 97.7 F | WEIGHT: 124 LBS | HEART RATE: 76 BPM | OXYGEN SATURATION: 95 % | DIASTOLIC BLOOD PRESSURE: 93 MMHG | RESPIRATION RATE: 18 BRPM

## 2019-11-20 ENCOUNTER — NURSING HOME (OUTPATIENT)
Dept: FAMILY MEDICINE CLINIC | Facility: CLINIC | Age: 84
End: 2019-11-20

## 2019-11-20 VITALS
WEIGHT: 121 LBS | OXYGEN SATURATION: 99 % | DIASTOLIC BLOOD PRESSURE: 76 MMHG | HEART RATE: 68 BPM | TEMPERATURE: 97.4 F | SYSTOLIC BLOOD PRESSURE: 126 MMHG | RESPIRATION RATE: 20 BRPM

## 2019-11-20 DIAGNOSIS — I10 ESSENTIAL HYPERTENSION: Chronic | ICD-10-CM

## 2019-11-20 DIAGNOSIS — Z78.9 IMPAIRED MOBILITY AND ADLS: Primary | Chronic | ICD-10-CM

## 2019-11-20 DIAGNOSIS — F01.50 VASCULAR DEMENTIA WITHOUT BEHAVIORAL DISTURBANCE (HCC): Chronic | ICD-10-CM

## 2019-11-20 DIAGNOSIS — J96.11 CHRONIC RESPIRATORY FAILURE WITH HYPOXIA (HCC): Chronic | ICD-10-CM

## 2019-11-20 DIAGNOSIS — K21.9 GERD WITHOUT ESOPHAGITIS: Chronic | ICD-10-CM

## 2019-11-20 DIAGNOSIS — D63.8 ANEMIA OF CHRONIC DISEASE: ICD-10-CM

## 2019-11-20 DIAGNOSIS — Z74.09 IMPAIRED MOBILITY AND ADLS: Primary | Chronic | ICD-10-CM

## 2019-11-20 DIAGNOSIS — R54 AGE-RELATED PHYSICAL DEBILITY: Chronic | ICD-10-CM

## 2019-11-20 DIAGNOSIS — J44.9 CHRONIC OBSTRUCTIVE BRONCHITIS (HCC): Chronic | ICD-10-CM

## 2019-11-20 DIAGNOSIS — E11.9 TYPE 2 DIABETES MELLITUS TREATED WITH INSULIN (HCC): Chronic | ICD-10-CM

## 2019-11-20 DIAGNOSIS — Z79.4 TYPE 2 DIABETES MELLITUS TREATED WITH INSULIN (HCC): Chronic | ICD-10-CM

## 2019-11-20 PROCEDURE — 99309 SBSQ NF CARE MODERATE MDM 30: CPT | Performed by: FAMILY MEDICINE

## 2019-12-02 LAB — HBA1C MFR BLD: 6.3 %

## 2019-12-04 NOTE — PROGRESS NOTES
Nursing Home Progress Note        Jose D Pennington DO [x]  Shelley Adam, APRN ?  [address] [facility], [place] [address]  Phone: [phone]  Fax: [phone] Campbell Larios MD ?  Michele Kearney DO ?  [address] [place] [address]  Phone: [phone]  Fax: [phone]     PATIENT NAME: Brigette Ramirez                                                                          YOB: 1930           DATE OF SERVICE: 10/23/2019  FACILITY: []  [place]  []   [x]  [facility]  [] [facility]  []  Other ______________________________________________________________________      CHIEF COMPLAINT:  Chronic medical management and long-term care      HISTORY OF PRESENT ILLNESS:   [x]  Follow Up visit for coordination of long term care issues and chronic medical management of Diagnoses and all orders for this visit:    Impaired mobility and ADLs    Age-related physical debility    Anemia of chronic disease with mixed iron deficiency    Type 2 diabetes mellitus treated with insulin (CMS/HCC)    Chronic respiratory failure with hypoxia (CMS/[facility])    Essential hypertension    GERD without esophagitis    Chronic obstructive bronchitis (CMS/HCC)    Vascular dementia without behavioral disturbance (CMS/[facility])          PAST MEDICAL & SURGICAL HISTORY:   No past medical history on file.   No past surgical history on file.      MEDICATIONS:  I have reviewed and reconciled the patients medication list in the patients chart at the skilled nursing facility today.      ALLERGIES:    Allergies not on file      SOCIAL HISTORY:    Social History     Socioeconomic History   • Marital status:      Spouse name: Not on file   • Number of children: Not on file   • Years of education: Not on file   • Highest education level: Not on file       FAMILY HISTORY:    No family history on file.    REVIEW OF SYSTEMS:    Review of Systems  · Appetite: Fair [x]   Good []   Poor []   Weight Loss []  [x]  Weight Stable    Unavoidable Weight Loss []  Tolerating Tube Feeding []    Supplements Provided []   Patient is a poor historian, review of systems obtained on review of records, as well as in direct consultation with patient's treating nurses and staff.    PHYSICAL EXAMINATION:   VITAL SIGNS:   Vitals:    10/23/19 0902   BP: 164/93   Pulse: 76   Resp: 18   Temp: 97.7 °F (36.5 °C)   SpO2: 95%       Physical Exam    General Appearance:  [x]  Alert   [x]  Oriented x person  [x]  No acute distress, moderately ill-appearing chronically however     [x]  Confused  []  Disoriented   []  Comatose   Head:  Atraumatic and normocephalic, without obvious abnormality.   Eyes:         PERRLA, conjunctivae and sclerae normal, no Icterus. No pallor. Extra-occular movements are within normal limits.   Ears:  Ears appear intact with no abnormalities noted.  Loss of hearing.   Throat: No oral lesions, no thrush, oral mucosa moist.   Neck: Supple, trachea midline, no thyromegaly, no carotid bruit.   Back:   No kyphoscoliosis. No tenderness to palpation.   Lungs:    Slight barrel chest.  Rhonchus referred congestive changes throughout all lung fields, audible air exchange noted however.  End expiratory wheezes with prolonged expiratory phase bilaterally.   Heart:  Normal S1 and S2, no murmur, no gallop, no rub. No appreciable JVD.   Abdomen:   Normal bowel sounds, no masses, no organomegaly. Soft, non-tender, non-distended, no guarding    Extremities: Moves all extremities.  No appreciable edema to lower extremities.  Severely frail build, very poor core strength and stability.  Unable to independently transfer, nor ambulate.   Pulses: Pulses palpable and equal bilaterally.   Skin: No bleeding or rash.  Generalized dry skin noted.  Age-related atrophy of skin.   Neurologic: [x] Garbled speech []  Normal mental status    [x] Cranial nerves II through XII intact   [x]  No anosmia [x]  DTR 2+ [x]  Proprioception intact  [x]  No new focal motor/sensory  deficits, but generalized frail nature of severe nature.      Psych/Mood:                    [x]  No acute changes []  Depressed  Urinary:                            []  Continent  [x]  Incontinent []  Retention  []  F/C      []  UTI w/treatment in progress         ASSESSMENT     Diagnoses and all orders for this visit:    1. Impaired mobility and ADLs (Primary)    2. Age-related physical debility    3. Anemia of chronic disease with mixed iron deficiency    4. Type 2 diabetes mellitus treated with insulin (CMS/[facility])    5. Chronic respiratory failure with hypoxia (CMS/[facility])    6. Essential hypertension    7. GERD without esophagitis    8. Chronic obstructive bronchitis (CMS/[facility])    9. Vascular dementia without behavioral disturbance (CMS/[facility])          PLAN  Continue supportive care for all mobilization/transfers, as well as any ADL assistance.    I am pleased with her continued weight stability, no appreciable change to her dietary intake.  I have asked that they maintain adequate hydration and continue to make healthy choices with her snacks and meals in relation to her blood glucose control.     Pain appears adequately controlled at this time.    Vital signs demonstrated uncontrolled blood pressure, however I do suspect a degree of that is due to the agitation of having her blood pressure checked given her generalized frail nature.  Vital signs otherwise demonstrate hemodynamic stability.  Continue to follow closely, changes will be made to her antihypertensive regimen as needed.    Continue planned surveillance labs.    She does not demonstrate any acute exacerbation of her respiratory symptoms at this time, continue oxygen supplementation another pulmonary care as already ordered.    Patient does continue to decline globally, including cognition.  I do suspect that she is approaching the end of her reserve.  Continue to discuss her care with her daughters, changes made to her treatment goals/plan of care if  needed.    [x]  Discussed Patient in detail with nursing/staff, addressed all needs today.     [x]  Plan of Care Reviewed   []  PT/OT Reviewed   []  Order Changes  []  Discharge Plans Reviewed   []  Code Status Changes         Jose D Pennington DO  10/23/2019

## 2019-12-05 RX ORDER — METHYLPHENIDATE HYDROCHLORIDE 5 MG/1
TABLET ORAL
Qty: 60 TABLET | Refills: 0 | Status: SHIPPED | OUTPATIENT
Start: 2019-12-05 | End: 2020-01-01 | Stop reason: SDUPTHER

## 2020-01-01 ENCOUNTER — NURSING HOME (OUTPATIENT)
Dept: FAMILY MEDICINE CLINIC | Facility: CLINIC | Age: 85
End: 2020-01-01

## 2020-01-01 ENCOUNTER — NURSING HOME (OUTPATIENT)
Dept: FAMILY MEDICINE CLINIC | Facility: CLINIC | Age: 85
End: 2020-01-01
Payer: MEDICARE

## 2020-01-01 ENCOUNTER — TELEPHONE (OUTPATIENT)
Dept: FAMILY MEDICINE CLINIC | Facility: CLINIC | Age: 85
End: 2020-01-01

## 2020-01-01 VITALS
SYSTOLIC BLOOD PRESSURE: 126 MMHG | WEIGHT: 124 LBS | RESPIRATION RATE: 18 BRPM | DIASTOLIC BLOOD PRESSURE: 74 MMHG | HEART RATE: 70 BPM | TEMPERATURE: 97.2 F | OXYGEN SATURATION: 94 %

## 2020-01-01 VITALS
DIASTOLIC BLOOD PRESSURE: 59 MMHG | RESPIRATION RATE: 20 BRPM | HEART RATE: 61 BPM | OXYGEN SATURATION: 94 % | WEIGHT: 126 LBS | SYSTOLIC BLOOD PRESSURE: 118 MMHG | HEART RATE: 68 BPM | TEMPERATURE: 98 F | OXYGEN SATURATION: 97 % | DIASTOLIC BLOOD PRESSURE: 61 MMHG | WEIGHT: 126 LBS | RESPIRATION RATE: 18 BRPM | TEMPERATURE: 98.4 F | SYSTOLIC BLOOD PRESSURE: 127 MMHG

## 2020-01-01 VITALS
DIASTOLIC BLOOD PRESSURE: 75 MMHG | TEMPERATURE: 98.6 F | SYSTOLIC BLOOD PRESSURE: 118 MMHG | HEART RATE: 66 BPM | OXYGEN SATURATION: 94 % | RESPIRATION RATE: 18 BRPM | WEIGHT: 125 LBS

## 2020-01-01 VITALS
DIASTOLIC BLOOD PRESSURE: 69 MMHG | RESPIRATION RATE: 18 BRPM | TEMPERATURE: 98.2 F | SYSTOLIC BLOOD PRESSURE: 125 MMHG | HEART RATE: 71 BPM | OXYGEN SATURATION: 96 %

## 2020-01-01 VITALS
DIASTOLIC BLOOD PRESSURE: 72 MMHG | SYSTOLIC BLOOD PRESSURE: 129 MMHG | TEMPERATURE: 97.9 F | WEIGHT: 130 LBS | OXYGEN SATURATION: 97 % | RESPIRATION RATE: 20 BRPM | HEART RATE: 71 BPM

## 2020-01-01 VITALS
HEART RATE: 61 BPM | SYSTOLIC BLOOD PRESSURE: 120 MMHG | DIASTOLIC BLOOD PRESSURE: 64 MMHG | RESPIRATION RATE: 20 BRPM | OXYGEN SATURATION: 97 % | TEMPERATURE: 98 F | WEIGHT: 126 LBS

## 2020-01-01 VITALS
WEIGHT: 124 LBS | DIASTOLIC BLOOD PRESSURE: 70 MMHG | TEMPERATURE: 98.2 F | OXYGEN SATURATION: 98 % | SYSTOLIC BLOOD PRESSURE: 134 MMHG | RESPIRATION RATE: 18 BRPM | HEART RATE: 70 BPM

## 2020-01-01 VITALS
DIASTOLIC BLOOD PRESSURE: 62 MMHG | SYSTOLIC BLOOD PRESSURE: 120 MMHG | OXYGEN SATURATION: 96 % | RESPIRATION RATE: 20 BRPM | HEART RATE: 67 BPM | WEIGHT: 108 LBS | TEMPERATURE: 97.7 F

## 2020-01-01 VITALS
RESPIRATION RATE: 18 BRPM | SYSTOLIC BLOOD PRESSURE: 134 MMHG | HEART RATE: 72 BPM | DIASTOLIC BLOOD PRESSURE: 70 MMHG | OXYGEN SATURATION: 98 % | TEMPERATURE: 98.2 F | WEIGHT: 124 LBS

## 2020-01-01 VITALS
DIASTOLIC BLOOD PRESSURE: 66 MMHG | OXYGEN SATURATION: 97 % | RESPIRATION RATE: 20 BRPM | WEIGHT: 127 LBS | HEART RATE: 63 BPM | SYSTOLIC BLOOD PRESSURE: 124 MMHG | TEMPERATURE: 97.9 F

## 2020-01-01 VITALS
TEMPERATURE: 98 F | RESPIRATION RATE: 18 BRPM | SYSTOLIC BLOOD PRESSURE: 122 MMHG | HEART RATE: 68 BPM | OXYGEN SATURATION: 98 % | WEIGHT: 124 LBS | DIASTOLIC BLOOD PRESSURE: 81 MMHG

## 2020-01-01 VITALS
OXYGEN SATURATION: 97 % | RESPIRATION RATE: 18 BRPM | TEMPERATURE: 97.9 F | SYSTOLIC BLOOD PRESSURE: 117 MMHG | WEIGHT: 105 LBS | HEART RATE: 65 BPM | DIASTOLIC BLOOD PRESSURE: 63 MMHG

## 2020-01-01 VITALS
SYSTOLIC BLOOD PRESSURE: 136 MMHG | WEIGHT: 109 LBS | RESPIRATION RATE: 18 BRPM | HEART RATE: 72 BPM | TEMPERATURE: 98 F | DIASTOLIC BLOOD PRESSURE: 74 MMHG | OXYGEN SATURATION: 97 %

## 2020-01-01 VITALS
SYSTOLIC BLOOD PRESSURE: 120 MMHG | WEIGHT: 107 LBS | TEMPERATURE: 97.9 F | HEART RATE: 60 BPM | OXYGEN SATURATION: 96 % | DIASTOLIC BLOOD PRESSURE: 60 MMHG | RESPIRATION RATE: 20 BRPM

## 2020-01-01 VITALS
HEART RATE: 84 BPM | TEMPERATURE: 97.2 F | OXYGEN SATURATION: 94 % | DIASTOLIC BLOOD PRESSURE: 62 MMHG | RESPIRATION RATE: 20 BRPM | SYSTOLIC BLOOD PRESSURE: 118 MMHG | WEIGHT: 125 LBS

## 2020-01-01 VITALS
SYSTOLIC BLOOD PRESSURE: 137 MMHG | DIASTOLIC BLOOD PRESSURE: 78 MMHG | TEMPERATURE: 97.9 F | WEIGHT: 130 LBS | HEART RATE: 73 BPM | RESPIRATION RATE: 18 BRPM | OXYGEN SATURATION: 96 %

## 2020-01-01 VITALS
RESPIRATION RATE: 18 BRPM | SYSTOLIC BLOOD PRESSURE: 110 MMHG | DIASTOLIC BLOOD PRESSURE: 72 MMHG | WEIGHT: 126 LBS | HEART RATE: 74 BPM | OXYGEN SATURATION: 97 % | TEMPERATURE: 98 F

## 2020-01-01 VITALS
HEART RATE: 74 BPM | SYSTOLIC BLOOD PRESSURE: 142 MMHG | TEMPERATURE: 97.8 F | RESPIRATION RATE: 18 BRPM | OXYGEN SATURATION: 94 % | DIASTOLIC BLOOD PRESSURE: 88 MMHG | WEIGHT: 123 LBS

## 2020-01-01 VITALS
DIASTOLIC BLOOD PRESSURE: 68 MMHG | TEMPERATURE: 97.8 F | RESPIRATION RATE: 18 BRPM | OXYGEN SATURATION: 98 % | SYSTOLIC BLOOD PRESSURE: 123 MMHG | WEIGHT: 124 LBS | HEART RATE: 80 BPM

## 2020-01-01 VITALS
SYSTOLIC BLOOD PRESSURE: 134 MMHG | DIASTOLIC BLOOD PRESSURE: 68 MMHG | TEMPERATURE: 97.5 F | WEIGHT: 127 LBS | HEART RATE: 68 BPM | OXYGEN SATURATION: 96 % | RESPIRATION RATE: 18 BRPM

## 2020-01-01 VITALS
DIASTOLIC BLOOD PRESSURE: 81 MMHG | TEMPERATURE: 98 F | SYSTOLIC BLOOD PRESSURE: 122 MMHG | OXYGEN SATURATION: 98 % | RESPIRATION RATE: 20 BRPM | HEART RATE: 70 BPM

## 2020-01-01 VITALS
SYSTOLIC BLOOD PRESSURE: 136 MMHG | TEMPERATURE: 98.2 F | WEIGHT: 130 LBS | DIASTOLIC BLOOD PRESSURE: 68 MMHG | HEART RATE: 67 BPM | RESPIRATION RATE: 20 BRPM | OXYGEN SATURATION: 97 %

## 2020-01-01 DIAGNOSIS — Z74.09 IMPAIRED MOBILITY AND ADLS: Chronic | ICD-10-CM

## 2020-01-01 DIAGNOSIS — J44.89 CHRONIC OBSTRUCTIVE BRONCHITIS: Chronic | ICD-10-CM

## 2020-01-01 DIAGNOSIS — E11.9 TYPE 2 DIABETES MELLITUS TREATED WITH INSULIN (HCC): Chronic | ICD-10-CM

## 2020-01-01 DIAGNOSIS — Z78.9: ICD-10-CM

## 2020-01-01 DIAGNOSIS — Z78.9 IMPAIRED MOBILITY AND ADLS: Chronic | ICD-10-CM

## 2020-01-01 DIAGNOSIS — R54 AGE-RELATED PHYSICAL DEBILITY: Chronic | ICD-10-CM

## 2020-01-01 DIAGNOSIS — R45.1 RESTLESS: ICD-10-CM

## 2020-01-01 DIAGNOSIS — J44.9 CHRONIC OBSTRUCTIVE BRONCHITIS (HCC): Chronic | ICD-10-CM

## 2020-01-01 DIAGNOSIS — Z78.9 IMPAIRED MOBILITY AND ADLS: Primary | Chronic | ICD-10-CM

## 2020-01-01 DIAGNOSIS — F01.50 VASCULAR DEMENTIA WITHOUT BEHAVIORAL DISTURBANCE (HCC): Chronic | ICD-10-CM

## 2020-01-01 DIAGNOSIS — J96.11 CHRONIC RESPIRATORY FAILURE WITH HYPOXIA (HCC): Chronic | ICD-10-CM

## 2020-01-01 DIAGNOSIS — Z79.4 TYPE 2 DIABETES MELLITUS TREATED WITH INSULIN (HCC): Chronic | ICD-10-CM

## 2020-01-01 DIAGNOSIS — K21.9 GERD WITHOUT ESOPHAGITIS: Chronic | ICD-10-CM

## 2020-01-01 DIAGNOSIS — Z87.2 HISTORY OF REMOVAL OF NEVUS: ICD-10-CM

## 2020-01-01 DIAGNOSIS — R23.9 SKIN SYMPTOM: ICD-10-CM

## 2020-01-01 DIAGNOSIS — Z74.09 IMPAIRED MOBILITY AND ADLS: Primary | Chronic | ICD-10-CM

## 2020-01-01 DIAGNOSIS — F41.9 ANXIETY: Primary | ICD-10-CM

## 2020-01-01 DIAGNOSIS — I10 ESSENTIAL HYPERTENSION: Chronic | ICD-10-CM

## 2020-01-01 DIAGNOSIS — D22.72 NEVUS OF LEFT THIGH: ICD-10-CM

## 2020-01-01 DIAGNOSIS — J96.11 CHRONIC RESPIRATORY FAILURE WITH HYPOXIA: Chronic | ICD-10-CM

## 2020-01-01 DIAGNOSIS — J44.9 CHRONIC OBSTRUCTIVE BRONCHITIS (HCC): ICD-10-CM

## 2020-01-01 DIAGNOSIS — N39.0 URINARY TRACT INFECTION WITHOUT HEMATURIA, SITE UNSPECIFIED: ICD-10-CM

## 2020-01-01 DIAGNOSIS — R30.0 DYSURIA: Primary | ICD-10-CM

## 2020-01-01 DIAGNOSIS — D63.8 ANEMIA OF CHRONIC DISEASE: ICD-10-CM

## 2020-01-01 DIAGNOSIS — H65.111 ACUTE MUCOID OTITIS MEDIA OF RIGHT EAR: ICD-10-CM

## 2020-01-01 DIAGNOSIS — B96.20 E-COLI UTI: ICD-10-CM

## 2020-01-01 DIAGNOSIS — E11.9 TYPE 2 DIABETES MELLITUS TREATED WITH INSULIN: Chronic | ICD-10-CM

## 2020-01-01 DIAGNOSIS — D22.72 NEVUS OF LEFT THIGH: Primary | ICD-10-CM

## 2020-01-01 DIAGNOSIS — H91.93 BILATERAL HEARING LOSS, UNSPECIFIED HEARING LOSS TYPE: ICD-10-CM

## 2020-01-01 DIAGNOSIS — J96.11 CHRONIC RESPIRATORY FAILURE WITH HYPOXIA (HCC): ICD-10-CM

## 2020-01-01 DIAGNOSIS — Z79.4 TYPE 2 DIABETES MELLITUS TREATED WITH INSULIN: Chronic | ICD-10-CM

## 2020-01-01 DIAGNOSIS — E11.9 TYPE 2 DIABETES MELLITUS TREATED WITH INSULIN (HCC): Primary | Chronic | ICD-10-CM

## 2020-01-01 DIAGNOSIS — E11.65 TYPE 2 DIABETES MELLITUS WITH HYPERGLYCEMIA, WITH LONG-TERM CURRENT USE OF INSULIN (HCC): ICD-10-CM

## 2020-01-01 DIAGNOSIS — Z87.898 HISTORY OF FATIGUE: ICD-10-CM

## 2020-01-01 DIAGNOSIS — Z79.4 TYPE 2 DIABETES MELLITUS WITH HYPERGLYCEMIA, WITH LONG-TERM CURRENT USE OF INSULIN (HCC): ICD-10-CM

## 2020-01-01 DIAGNOSIS — F01.50 VASCULAR DEMENTIA WITHOUT BEHAVIORAL DISTURBANCE: Chronic | ICD-10-CM

## 2020-01-01 DIAGNOSIS — Z79.4 TYPE 2 DIABETES MELLITUS TREATED WITH INSULIN (HCC): Primary | Chronic | ICD-10-CM

## 2020-01-01 DIAGNOSIS — R39.9 UTI SYMPTOMS: ICD-10-CM

## 2020-01-01 DIAGNOSIS — Z98.890 HISTORY OF REMOVAL OF NEVUS: ICD-10-CM

## 2020-01-01 DIAGNOSIS — R73.9 HYPERGLYCEMIA: ICD-10-CM

## 2020-01-01 DIAGNOSIS — N39.0 E-COLI UTI: ICD-10-CM

## 2020-01-01 DIAGNOSIS — R82.90 MALODOROUS URINE: ICD-10-CM

## 2020-01-01 DIAGNOSIS — R41.0: ICD-10-CM

## 2020-01-01 DIAGNOSIS — H61.22 LEFT EAR IMPACTED CERUMEN: ICD-10-CM

## 2020-01-01 PROCEDURE — 99309 SBSQ NF CARE MODERATE MDM 30: CPT | Performed by: NURSE PRACTITIONER

## 2020-01-01 PROCEDURE — 17110 DESTRUCTION B9 LES UP TO 14: CPT | Performed by: NURSE PRACTITIONER

## 2020-01-01 PROCEDURE — 99308 SBSQ NF CARE LOW MDM 20: CPT | Performed by: NURSE PRACTITIONER

## 2020-01-01 PROCEDURE — 99309 SBSQ NF CARE MODERATE MDM 30: CPT | Performed by: FAMILY MEDICINE

## 2020-01-01 PROCEDURE — 99024 POSTOP FOLLOW-UP VISIT: CPT | Performed by: NURSE PRACTITIONER

## 2020-01-01 RX ORDER — METHYLPHENIDATE HYDROCHLORIDE 5 MG/1
TABLET ORAL
Qty: 30 TABLET | Refills: 0 | Status: SHIPPED | OUTPATIENT
Start: 2020-01-01

## 2020-01-01 RX ORDER — METHYLPHENIDATE HYDROCHLORIDE 5 MG/1
TABLET ORAL
Qty: 30 TABLET | Refills: 0 | Status: SHIPPED | OUTPATIENT
Start: 2020-01-01 | End: 2020-01-01 | Stop reason: SDUPTHER

## 2020-01-01 RX ORDER — METHYLPHENIDATE HYDROCHLORIDE 5 MG/1
TABLET ORAL
Qty: 60 TABLET | Refills: 0 | Status: SHIPPED | OUTPATIENT
Start: 2020-01-01 | End: 2020-01-01 | Stop reason: SDUPTHER

## 2020-01-14 NOTE — PROGRESS NOTES
Nursing Home Follow Up Note      Jose D Pennington DO []   ANDREW Cherry [x]  [address] [place] [address]  Phone: [phone]  Fax: [phone] Campbell Larios MD []    Michele Kearney DO []   [address] [place] [address]  Phone: [phone]  Fax: [phone]     PATIENT NAME: Brigette Ramirez                                                                          YOB: 1930           DATE OF SERVICE: 1/13/2020  FACILITY:  [][facility]   [] [facility]   [x] [facility]   [] [facility]   [] Other ______________________________________________________________________        CHIEF COMPLAINT:  Skin abnormality       HISTORY OF PRESENT ILLNESS:   Patient with abnormal skin lesion on left upper thigh. Per staff and family, area has gotten larger, and is starting to get irritated, due to depends and clothing rubbing over it. Patient is also picking at area.     Follow up chronic conditions: COPD,HTN    PAST MEDICAL & SURGICAL HISTORY:   Diabetes  No past surgical history on file.      MEDICATIONS:  I have reviewed and reconciled the patients medication list in the patients chart at the skilled nursing facility today.      ALLERGIES:    Allergies not on file      SOCIAL HISTORY:    Social History     Socioeconomic History   • Marital status:      Spouse name: Not on file   • Number of children: Not on file   • Years of education: Not on file   • Highest education level: Not on file       FAMILY HISTORY:    No family history on file.    REVIEW OF SYSTEMS:    Review of Systems   Constitutional: Negative for activity change, appetite change, chills, diaphoresis, fatigue, fever, unexpected weight gain and unexpected weight loss.   HENT: Positive for hearing loss. Negative for congestion, ear discharge, ear pain, mouth sores, nosebleeds, postnasal drip, rhinorrhea, sinus pressure, sneezing, sore throat, swollen glands and trouble swallowing.    Eyes: Negative for blurred vision, pain,  discharge, redness, itching and visual disturbance.   Respiratory: Positive for cough. Negative for apnea, choking, chest tightness, shortness of breath, wheezing and stridor.    Cardiovascular: Negative for chest pain, palpitations and leg swelling.   Gastrointestinal: Negative for abdominal distention, abdominal pain, blood in stool, constipation, diarrhea, nausea, vomiting, GERD and indigestion.   Endocrine: Negative for polydipsia and polyuria.   Genitourinary: Negative for decreased urine volume, difficulty urinating, dysuria, flank pain, frequency, hematuria, urgency and urinary incontinence.   Musculoskeletal: Negative for arthralgias, back pain, gait problem, joint swelling and myalgias.   Skin: Positive for skin lesions. Negative for color change, dry skin and rash.   Allergic/Immunologic: Negative for environmental allergies.   Neurological: Positive for memory problem and confusion. Negative for dizziness, seizures, speech difficulty and weakness.   Psychiatric/Behavioral: Negative for behavioral problems, dysphoric mood, hallucinations, sleep disturbance and depressed mood. The patient is not nervous/anxious.        PHYSICAL EXAMINATION:   VITAL SIGNS:   Vitals:    01/13/20 1624   BP: 122/81   Pulse: 68   Resp: 18   Temp: 98 °F (36.7 °C)   SpO2: 98%   Weight: 56.2 kg (124 lb)       Physical Exam   Constitutional: She appears well-developed and well-nourished.   HENT:   Head: Normocephalic.   Right Ear: External ear normal.   Left Ear: External ear normal.   Nose: Nose normal.   Eyes: Conjunctivae are normal.   Neck: Normal range of motion.   Cardiovascular: Normal rate, regular rhythm, normal heart sounds and intact distal pulses.   Pulmonary/Chest: Effort normal. No stridor. No respiratory distress. She has decreased breath sounds. She has no wheezes. She has no rhonchi. She has no rales. She exhibits no tenderness.        Abdominal: Soft. Bowel sounds are normal. She exhibits no distension and no mass.  There is no tenderness. No hernia.   Musculoskeletal: She exhibits no edema.   Generalized weakness. Confined to bed or WC.   Neurological: She is alert.   Skin: Skin is warm and dry. Lesion noted. No rash noted.        Psychiatric: She has a normal mood and affect. Her behavior is normal.   Nursing note and vitals reviewed.    Exam re-performed and the findings are noted.      RECORDS REVIEW:   I have reviewed and interpreted the lab test results     ASSESSMENT     Nevus Left Thigh    HTN    COPD    Chronic Respiratory Failure    Impaired Mobility    PLAN  Nevus  -Had discussion with family today, would like to have cryotherapy performed on nevus, prior to going to Dermatology. Daughter made appointment with Dermatology, for next week, but would like attempts to remove in facility first.     HTN  -BP at goal on current medications. She is hemodynamically stable.     COPD/Resp Failure  -No s/s COPD exacerbation or respiratory distress. No increased O2 demands.    Staff to continue supportive care for all ADLs.     Staff encouraged to keep me informed of any acute changes, lack of improvement, or any new concerning symptoms.    [x]  Discussed Patient in detail with nursing/staff, addressed all needs today.     [x]  Plan of Care Reviewed   []  PT/OT Reviewed   []  Order Changes  []  Discharge Plans Reviewed   [x]  Advance Directive on file with Nursing Home.   [x]  POA on file with Nursing Home.   [x]  Code Status listed: []  Full Code   [x]  DNR     “I confirm accuracy of unchanged data/findings which have been carried forward from previous visit, as well as I have updated appropriately those that have changed.”

## 2020-01-18 NOTE — PROGRESS NOTES
Procedure   Cryotherapy, Skin Lesion  Date/Time: 1/14/2020 2:00 PM  Performed by: Shelley Adam APRN  Authorized by: Shelley Adam APRN   Preparation: NA.  Local anesthesia used: no    Anesthesia:  Local anesthesia used: no    Sedation:  Patient sedated: no    Patient tolerance: Patient tolerated the procedure well with no immediate complications  Comments: Consent form signed by patient's POA and myself. POA and other family at bedside with patient. Patient positioned in bed, to perform Cryotherapy on left thigh nevus. Nurse and family at bedside assisting and calming patient, related to her dementia. Nevus is approx 1.5 x 1.5 cm, and is palpable. Surrounding area cleaned.  Cryotherapy performed 10 times, on multiple areas of the nevus,  in 3 second intervals. Patient tolerated the procedure very well. She showed no signs of discomfort, and denied discomfort when asked. Nurse and family educated on stages nevus will follow, during treatment process,and that it may take multiple treatments. Keep area covered with dressing, if clothes or linens irritate the area. Family happy with procedure today, and POC. Staff and family to inform me of any s/s infection or complication: redness, warmth, drainage, fever, etc. Will continue to monitor nevus, and treat accordingly.  She is hemodynamically at this time.

## 2020-01-23 NOTE — PROGRESS NOTES
Procedure   Procedures          Patient had cryotherapy last week, on left thigh nevus. Today, nevus has fallen off, and an area of open skin remains. Family requested sliver nitrate stick to be used, to scab over the open area, to prevent infection. Silver nitrate stick was used on open skin, until all areas covered over. Daughter/POA, was at bedside. Consent form was explained and she did sign it. We also completed the time out. RN, Elizabeth, was at bedside during consent signing and treatment. The area was cleaned well before procedure. No sedatives or lidocaine used. Patient tolerated procedure well, with no s/s of discomfort. Staff and family advised to watch for any signs of bleeding, redness, warmth, drainage, fever, etc. May cover with dry dressing if family wishes. Will c/t monitor.

## 2020-01-29 NOTE — PROGRESS NOTES
Nursing Home Follow Up Note      Jose D Pennington DO []   ANDREW Cherry [x]  [address] [place] [address]  Phone: [phone]  Fax: [phone] Campbell Larios MD []    Michele Kearney DO []   [address] [place] [address]  Phone: [phone]  Fax: [phone]     PATIENT NAME: Brigette Ramirez                                                                          YOB: 1930           DATE OF SERVICE: 1/28/2020  FACILITY:  [][facility]   [] [facility]   [x] [facility]   [] [facility]   [] Other ______________________________________________________________________        CHIEF COMPLAINT:  Right ear pain and drainage    Odorous urine    F/u on site of left thigh nevus removal        HISTORY OF PRESENT ILLNESS:   Per staff, patient has yellow drainage from right ear, since this morning. She also has some dried blood in her canal, like she has been digging in it.     Family and staff also concerned about malodorous urine they have noticed today. They feel she has a UTI.    Site on left thigh, from nevus removal, is healed and only a pin point scar remains.           PAST MEDICAL & SURGICAL HISTORY:   Diabetes  No past surgical history on file.      MEDICATIONS:  I have reviewed and reconciled the patients medication list in the patients chart at the skilled nursing facility today.      ALLERGIES:    Allergies not on file      SOCIAL HISTORY:    Social History     Socioeconomic History   • Marital status:      Spouse name: Not on file   • Number of children: Not on file   • Years of education: Not on file   • Highest education level: Not on file       FAMILY HISTORY:    No family history on file.    REVIEW OF SYSTEMS:    Review of Systems   Constitutional: Negative for activity change, appetite change, chills, diaphoresis, fatigue, fever, unexpected weight gain and unexpected weight loss.   HENT: Positive for ear discharge, ear pain and hearing loss. Negative for congestion,  mouth sores, nosebleeds, postnasal drip, rhinorrhea, sinus pressure, sneezing, sore throat, swollen glands and trouble swallowing.    Eyes: Negative for blurred vision, pain, discharge, redness, itching and visual disturbance.   Respiratory: Positive for cough. Negative for apnea, choking, chest tightness, shortness of breath, wheezing and stridor.    Cardiovascular: Negative for chest pain, palpitations and leg swelling.   Gastrointestinal: Negative for abdominal distention, abdominal pain, blood in stool, constipation, diarrhea, nausea, vomiting, GERD and indigestion.   Endocrine: Negative for polydipsia and polyuria.   Genitourinary: Negative for decreased urine volume, difficulty urinating, dysuria, flank pain, frequency, hematuria, urgency and urinary incontinence.   Musculoskeletal: Negative for arthralgias, back pain, gait problem, joint swelling and myalgias.   Skin: Positive for skin lesions. Negative for color change, dry skin and rash.   Allergic/Immunologic: Negative for environmental allergies.   Neurological: Positive for memory problem and confusion. Negative for dizziness, seizures, speech difficulty and weakness.   Psychiatric/Behavioral: Negative for behavioral problems, dysphoric mood, hallucinations, sleep disturbance and depressed mood. The patient is not nervous/anxious.        PHYSICAL EXAMINATION:   VITAL SIGNS:   Vitals:    01/28/20 0941   BP: 134/70   Pulse: 72   Resp: 18   Temp: 98.2 °F (36.8 °C)   SpO2: 98%   Weight: 56.2 kg (124 lb)       Physical Exam   Constitutional: She appears well-developed and well-nourished.   HENT:   Head: Normocephalic.   Right Ear: External ear normal. There is drainage.   Left Ear: External ear normal. An impacted cerumen is present.  Nose: Nose normal.   Eyes: Conjunctivae are normal.   Neck: Normal range of motion.   Cardiovascular: Normal rate, regular rhythm, normal heart sounds and intact distal pulses.   Pulmonary/Chest: Effort normal. No stridor. No  respiratory distress. She has decreased breath sounds. She has no wheezes. She has no rhonchi. She has no rales. She exhibits no tenderness.        Abdominal: Soft. Bowel sounds are normal. She exhibits no distension and no mass. There is no tenderness. No hernia.   Musculoskeletal: She exhibits no edema.   Generalized weakness. Confined to bed or WC.   Neurological: She is alert.   Skin: Skin is warm and dry. No lesion and no rash noted.        Psychiatric: She has a normal mood and affect. Her behavior is normal.   Nursing note and vitals reviewed.    Exam re-performed and the findings are noted.      RECORDS REVIEW:   I have reviewed and interpreted the lab test results     ASSESSMENT     Right mucoid otitis media    Left cerumen impaction    Malodorous urine    Nevus Left Thigh    Impaired Mobility    PLAN  Right mucoid otitis media  -Give Levaquin 500 mg day 1, then 250 mg q day x 4 days.     Left cerumen impaction  -Apply debrox drops, 5 gtts bid x 4 days, then irrigate ear.    Malodorous urine  -Send urine for UA with culture    Left thigh Nevus  -Nevus has completely resolved, pin point scar is all that remains. Family very pleased with results.     Staff to continue supportive care for all ADLs.     Staff encouraged to keep me informed of any acute changes, lack of improvement, or any new concerning symptoms.    [x]  Discussed Patient in detail with nursing/staff, addressed all needs today.     [x]  Plan of Care Reviewed   []  PT/OT Reviewed   [x]  Order Changes  []  Discharge Plans Reviewed   [x]  Advance Directive on file with Nursing Home.   [x]  POA on file with Nursing Home.   [x]  Code Status listed: []  Full Code   [x]  DNR     “I confirm accuracy of unchanged data/findings which have been carried forward from previous visit, as well as I have updated appropriately those that have changed.”

## 2020-02-04 NOTE — TELEPHONE ENCOUNTER
Spoke with Shelley, patient is already on oral abx and ear drops. They also done culture on ear. Advised daughter, verbalized understanding.

## 2020-02-04 NOTE — TELEPHONE ENCOUNTER
Pt daughter called in stated that the pt ear is draining and has a bad odor. Pt daughter needs to know if pt needs ear drops please call back at  [phone]

## 2020-02-06 NOTE — PROGRESS NOTES
Nursing Home Follow Up Note      Jose D Pennington DO []   ANDREW Cherry [x]  [address] [place] [address]  Phone: [phone]  Fax: [phone] Campbell Larios MD []    Michele Kearney DO []   [address] [place] [address]  Phone: [phone]  Fax: [phone]     PATIENT NAME: Brigette Ramirez                                                                          YOB: 1930           DATE OF SERVICE: 2/5/2020  FACILITY:  [][facility]   [] [facility]   [x] [facility]   [] [facility]   [] Other ______________________________________________________________________        CHIEF COMPLAINT:  F/u right ear infection       HISTORY OF PRESENT ILLNESS:   Per staff, patient's ear is much improved since completing Levaquin. She also completed debrox drops as well. She has not had any drainage the past couple days. Patient has not complained of pain.         PAST MEDICAL & SURGICAL HISTORY:   Diabetes  No past surgical history on file.      MEDICATIONS:  I have reviewed and reconciled the patients medication list in the patients chart at the skilled nursing facility today.      ALLERGIES:    Allergies not on file      SOCIAL HISTORY:    Social History     Socioeconomic History   • Marital status:      Spouse name: Not on file   • Number of children: Not on file   • Years of education: Not on file   • Highest education level: Not on file       FAMILY HISTORY:    No family history on file.    REVIEW OF SYSTEMS:    Review of Systems   Constitutional: Negative for activity change, appetite change, chills, diaphoresis, fatigue, fever, unexpected weight gain and unexpected weight loss.   HENT: Positive for hearing loss. Negative for congestion, ear discharge, ear pain, mouth sores, nosebleeds, postnasal drip, rhinorrhea, sinus pressure, sneezing, sore throat, swollen glands and trouble swallowing.    Eyes: Negative for blurred vision, pain, discharge, redness, itching and visual  disturbance.   Respiratory: Negative for apnea, cough, choking, chest tightness, shortness of breath, wheezing and stridor.    Cardiovascular: Negative for chest pain, palpitations and leg swelling.   Gastrointestinal: Negative for abdominal distention, abdominal pain, blood in stool, constipation, diarrhea, nausea, vomiting, GERD and indigestion.   Endocrine: Negative for polydipsia and polyuria.   Genitourinary: Negative for decreased urine volume, difficulty urinating, dysuria, flank pain, frequency, hematuria, urgency and urinary incontinence.   Musculoskeletal: Negative for arthralgias, back pain, gait problem, joint swelling and myalgias.   Skin: Negative for color change, dry skin, rash and skin lesions.   Allergic/Immunologic: Negative for environmental allergies.   Neurological: Positive for memory problem and confusion. Negative for dizziness, seizures, speech difficulty and weakness.   Psychiatric/Behavioral: Negative for behavioral problems, dysphoric mood, hallucinations, sleep disturbance and depressed mood. The patient is not nervous/anxious.        PHYSICAL EXAMINATION:   VITAL SIGNS:   Vitals:    02/05/20 1023   BP: 123/68   Pulse: 80   Resp: 18   Temp: 97.8 °F (36.6 °C)   SpO2: 98%   Weight: 56.2 kg (124 lb)       Physical Exam   Constitutional: She appears well-developed and well-nourished.   HENT:   Head: Normocephalic.   Right Ear: External ear normal. No drainage (no drainage in canal at this time).   Left Ear: External ear normal.   Nose: Nose normal.   Eyes: Conjunctivae are normal.   Neck: Normal range of motion.   Cardiovascular: Normal rate, regular rhythm, normal heart sounds and intact distal pulses.   Pulmonary/Chest: Effort normal. No stridor. No respiratory distress. She has decreased breath sounds. She has no wheezes. She has no rhonchi. She has no rales. She exhibits no tenderness.        Abdominal: Soft. Bowel sounds are normal. She exhibits no distension and no mass. There is no  tenderness. No hernia.   Musculoskeletal: She exhibits no edema.   Generalized weakness. Confined to bed or WC.   Neurological: She is alert.   Skin: Skin is warm and dry. No lesion and no rash noted.   Psychiatric: She has a normal mood and affect. Her behavior is normal.   Nursing note and vitals reviewed.        RECORDS REVIEW:   I have reviewed and interpreted the lab test results     ASSESSMENT     Right mucoid otitis media    Chronic respiratory failure    Vascular dementia with behaviors    Impaired Mobility    PLAN    Right mucoid otitis media  -Resolved with antibiotic and debrox. Will monitor.     Chronic respiratory failure  -Stable with no increased work of breathing or increased 02 demand    Dementia  -Stable with no acute behaviors. Appetite and weight stable.     Staff to continue supportive care for all ADLs.     Staff encouraged to keep me informed of any acute changes, lack of improvement, or any new concerning symptoms.    [x]  Discussed Patient in detail with nursing/staff, addressed all needs today.     [x]  Plan of Care Reviewed   []  PT/OT Reviewed   []  Order Changes  []  Discharge Plans Reviewed   [x]  Advance Directive on file with Nursing Home.   [x]  POA on file with Nursing Home.   [x]  Code Status listed: []  Full Code   [x]  DNR     “I confirm accuracy of unchanged data/findings which have been carried forward from previous visit, as well as I have updated appropriately those that have changed.”

## 2020-02-12 NOTE — PROGRESS NOTES
Nursing Home Progress Note        Jose D Pennington DO [x]  ANDREW Cherry []  [address] [place] [address]  Phone: [phone]  Fax: [phone] Campbell Larios MD []  Michele Kearney DO []  [address] [place] [address]  Phone: [phone]  Fax: [phone]     PATIENT NAME: Brigette Ramirez                                                                          YOB: 1930           DATE OF SERVICE: 11/20/2019  FACILITY: []  [facility]  [] [facility]  [x]  [facility]  [] [facility]  []  Other ______________________________________________________________________     CHIEF COMPLAINT:  Impaired mobility and ADLs/chronic respiratory failure with hypoxia/vascular dementia/type 2 diabetes mellitus treated with insulin/hypertension/long-term care needs      HISTORY OF PRESENT ILLNESS:   [x]  Follow Up visit for coordination of long term care issues and chronic medical management of Diagnoses and all orders for this visit:    Impaired mobility and ADLs    Age-related physical debility    Anemia of chronic disease with mixed iron deficiency    Vascular dementia without behavioral disturbance (CMS/HCC)    Essential hypertension    Chronic respiratory failure with hypoxia (CMS/HCC)    Chronic obstructive bronchitis (CMS/HCC)    GERD without esophagitis    Type 2 diabetes mellitus treated with insulin (CMS/HCC)    There have been no reports of any focal aspiration.  Patient continues to have positive p.o. intake, although only minimal amounts given her limited activity level.  She has demonstrated continued global decline with respect to her cardiopulmonary status, as well as overall wellbeing.  There are no reports of any fever or chills.  She continues to utilize oxygen supplementation as well as receive respiratory care.  Does not appear to demonstrate any uncontrolled pain.      PAST MEDICAL & SURGICAL HISTORY:   No past medical history on file.   No past surgical history on file.       MEDICATIONS:  I have reviewed and reconciled the patients medication list in the patients chart at the skilled nursing facility today.      ALLERGIES:    Allergies not on file      SOCIAL HISTORY:    Social History     Socioeconomic History   • Marital status:      Spouse name: Not on file   • Number of children: Not on file   • Years of education: Not on file   • Highest education level: Not on file       FAMILY HISTORY:    No family history on file.    REVIEW OF SYSTEMS:  Patient is a poor historian, review of systems obtained on direct discussion with patient's treating nurse, as well as staff and review of records.  Review of Systems  · Appetite: Fair [x]   Good []   Poor []   Weight Loss []  [x]  Weight Stable   Unavoidable Weight Loss []  Tolerating Tube Feeding []    Supplements Provided []   · Constitutional: Negative for fever, chills, diaphoresis or fatigue and weight change.     PHYSICAL EXAMINATION:   VITAL SIGNS:   Vitals:    11/20/19 1044   BP: 126/76   Pulse: 68   Resp: 20   Temp: 97.4 °F (36.3 °C)   SpO2: 99%       Physical Exam    General Appearance:  [x]  Alert   [x]  Oriented x person  [x]  No acute distress     [x]  Confused at baseline []  Disoriented   []  Comatose   Head:  Atraumatic and normocephalic, without obvious abnormality.   Eyes:         PERRLA, conjunctivae and sclerae normal, no Icterus. No pallor. Extra-occular movements are within normal limits.   Ears:  Ears appear intact with no abnormalities noted.  Severely decreased hearing bilaterally   Throat: No oral lesions, no thrush, oral mucosa moist.   Neck: Supple, trachea midline, no thyromegaly, no carotid bruit.   Back:   No kyphoscoliosis. No tenderness to palpation.   Lungs:   Chest shape is normal. Breath sounds heard bilaterally equally.  No wheezing.    Long expiratory phase bilaterally.  Rhonchus referred congestive changes throughout all lung fields, partially cleared with heavy cough.   Heart:  Normal S1 and S2,  no murmur, no gallop, no rub. No JVD.   Abdomen:   Normal bowel sounds, no masses, no organomegaly. Soft, non-tender, non-distended, no guarding    Extremities: Moves all extremities at times, without edema, cyanosis or clubbing.    Thin, frail build.   Poor core strength and stability.   Pulses: Pulses palpable and equal bilaterally.   Skin: No bleeding or rash.  Generalized dry skin noted.  Age-related atrophy of skin.   Neurologic: [x] Normal speech []  Normal mental status    [x] Cranial nerves II through XII intact, severely limited hearing capacity.  [x]  No anosmia [x]  DTR 2+ [x]  Proprioception intact  [x]  Age related motor/sensory deficits.      Psych/Mood:                    [x]  No acute changes []  Depressed  Urinary:                            []  Continent  [x]  Incontinent []  Retention  []  F/C      []  UTI w/treatment in progress         ASSESSMENT     Diagnoses and all orders for this visit:    1. Impaired mobility and ADLs (Primary)    2. Age-related physical debility    3. Anemia of chronic disease with mixed iron deficiency    4. Vascular dementia without behavioral disturbance (CMS/HCC)    5. Essential hypertension    6. Chronic respiratory failure with hypoxia (CMS/HCC)    7. Chronic obstructive bronchitis (CMS/HCC)    8. GERD without esophagitis    9. Type 2 diabetes mellitus treated with insulin (CMS/[facility])          PLAN  Patient does continue to demonstrate global decline, as a result of her advancing age as well as numerous chronic comorbid conditions.  We will continue supportive care as needed for any mobilization/transfers, as well as all ADL assistance.    Continue to avoid prolonged fasting periods as best able.  Continue adjustments to her insulin treatment regimen as needed to maintain maximum blood glucose control.  I would prefer her blood glucose to not be significantly low, as her ability to compensate is rather limited.  Blood pressure demonstrates hemodynamic stability.    She  has demonstrated no need for increased oxygen supplementation.    Continue surveillance labs as needed.    [x]  Discussed Patient in detail with nursing/staff, addressed all needs today.     [x]  Plan of Care Reviewed   []  PT/OT Reviewed   []  Order Changes  []  Discharge Plans Reviewed   []  Code Status Changes         Jose D Pennington DO  11/20/2019

## 2020-03-26 NOTE — PROGRESS NOTES
Nursing Home Follow Up Note      Jose D Pennington DO []   ANDREW Cherry [x]  [address] [place] [address]  Phone: [phone]  Fax: [phone] Campbell Larios MD []    Michele Kearney DO []   [address] [place] [address]  Phone: [phone]  Fax: [phone]     PATIENT NAME: Brigette Ramirez                                                                          YOB: 1930           DATE OF SERVICE: 3/24/2020  FACILITY:  [][place]   [] [facility]   [x] [facility]   [] [facility]   [] Other ______________________________________________________________________        CHIEF COMPLAINT:  F/u dementia symptoms       HISTORY OF PRESENT ILLNESS:   Following up on patient today, to assess status of her dementia symptoms. Since her admission, he daughters have been at facility, to feed her lunch and dinner. No family/visitors, have been permitted to come to the facility, for the past 2 weeks. Per nursing and documentation, she has been eating well for staff, and her weight is stable, from 123-124. She has had not problems or complaints.       PAST MEDICAL & SURGICAL HISTORY:   Diabetes  No past surgical history on file.      MEDICATIONS:  I have reviewed and reconciled the patients medication list in the patients chart at the skilled nursing facility today.      ALLERGIES:    Allergies not on file      SOCIAL HISTORY:    Social History     Socioeconomic History   • Marital status:      Spouse name: Not on file   • Number of children: Not on file   • Years of education: Not on file   • Highest education level: Not on file   Tobacco Use   • Smoking status: Former Smoker   • Smokeless tobacco: Never Used       FAMILY HISTORY:    No family history on file.    REVIEW OF SYSTEMS:    Review of Systems   Constitutional: Negative for activity change, appetite change, chills, diaphoresis, fatigue, fever, unexpected weight gain and unexpected weight loss.   HENT: Positive for hearing  loss. Negative for congestion, ear discharge, ear pain, mouth sores, nosebleeds, postnasal drip, rhinorrhea, sinus pressure, sneezing, sore throat, swollen glands and trouble swallowing.    Eyes: Negative for blurred vision, pain, discharge, redness, itching and visual disturbance.   Respiratory: Negative for apnea, cough, choking, chest tightness, shortness of breath, wheezing and stridor.    Cardiovascular: Negative for chest pain, palpitations and leg swelling.   Gastrointestinal: Negative for abdominal distention, abdominal pain, blood in stool, constipation, diarrhea, nausea, vomiting, GERD and indigestion.   Endocrine: Negative for polydipsia and polyuria.   Genitourinary: Negative for decreased urine volume, difficulty urinating, dysuria, flank pain, frequency, hematuria, urgency and urinary incontinence.   Musculoskeletal: Positive for gait problem. Negative for arthralgias, back pain, joint swelling and myalgias.        Confined to bed or wheelchair, non ambulatory   Skin: Negative for color change, dry skin, rash and skin lesions.   Allergic/Immunologic: Negative for environmental allergies.   Neurological: Positive for weakness, memory problem and confusion. Negative for dizziness, seizures and speech difficulty.   Psychiatric/Behavioral: Negative for behavioral problems, dysphoric mood, hallucinations, sleep disturbance and depressed mood. The patient is not nervous/anxious.        PHYSICAL EXAMINATION:   VITAL SIGNS:   Vitals:    03/24/20 1138   BP: 142/88   Pulse: 74   Resp: 18   Temp: 97.8 °F (36.6 °C)   SpO2: 94%   Weight: 55.8 kg (123 lb)       Physical Exam   Constitutional: She appears well-developed and well-nourished.   HENT:   Head: Normocephalic.   Right Ear: External ear normal. No drainage.   Left Ear: External ear normal. No drainage.   Nose: Nose normal.   Eyes: Conjunctivae are normal.   Neck: Normal range of motion.   Cardiovascular: Normal rate, regular rhythm, normal heart sounds and  intact distal pulses.   Pulmonary/Chest: Effort normal. No stridor. No respiratory distress. She has decreased breath sounds. She has no wheezes. She has no rhonchi. She has no rales. She exhibits no tenderness.        Abdominal: Soft. Bowel sounds are normal. She exhibits no distension and no mass. There is no tenderness. No hernia.   Musculoskeletal: She exhibits no edema.   Generalized weakness. Confined to bed or WC.   Neurological: She is alert.   Skin: Skin is warm and dry. No lesion and no rash noted.   Psychiatric: She has a normal mood and affect. Her behavior is normal.   Nursing note and vitals reviewed.        RECORDS REVIEW:   I have reviewed and interpreted the lab test results      Advance Care Planning   ACP discussion was declined by the patient. Patient has an advance directive in EMR which is still valid.     ASSESSMENT     Vascular dementia with behaviors    Impaired Mobility    Age-related debility    PLAN    Dementia  -Stable with no acute behaviors. Appetite and weight stable.     Staff to continue supportive care for all ADLs.     Staff encouraged to keep me informed of any acute changes, lack of improvement, or any new concerning symptoms.    [x]  Discussed Patient in detail with nursing/staff, addressed all needs today.     [x]  Plan of Care Reviewed   []  PT/OT Reviewed   []  Order Changes  []  Discharge Plans Reviewed   [x]  Advance Directive on file with Nursing Home.   [x]  POA on file with Nursing Home.   [x]  Code Status listed: []  Full Code   [x]  DNR     “I confirm accuracy of unchanged data/findings which have been carried forward from previous visit, as well as I have updated appropriately those that have changed.”

## 2020-04-26 NOTE — PROGRESS NOTES
Nursing Home Follow Up Note      Jose D Pennington DO []   ANDREW Cherry [x]  [address] [place] [address]  Phone: [phone]  Fax: [phone] Campbell Larios MD []    Michele Kearney DO []   [address] [place] [address]  Phone: [phone]  Fax: [phone]     PATIENT NAME: Brigette Ramirez                                                                          YOB: 1930           DATE OF SERVICE: 4/23/2020  FACILITY:  [][facility]   [] [place]   [x] [facility]   [] [facility]   [] Other ______________________________________________________________________        CHIEF COMPLAINT:  F/u dementia symptoms       HISTORY OF PRESENT ILLNESS:   Following up on patient today, to assess status of her dementia symptoms, due to COVID 19 visitor restrictions. Since her admission, he daughters have been at facility, to feed her lunch and dinner, but have not been permitted to come to the facility, for the past 6 weeks. Per nursing, she has been eating well for staff, and her weight is stable. She has had not problems or complaints.       PAST MEDICAL & SURGICAL HISTORY:   Diabetes  History reviewed. No pertinent surgical history.      MEDICATIONS:  I have reviewed and reconciled the patients medication list in the patients chart at the skilled nursing facility today.      ALLERGIES:    Allergies not on file      SOCIAL HISTORY:    Social History     Socioeconomic History   • Marital status:      Spouse name: Not on file   • Number of children: Not on file   • Years of education: Not on file   • Highest education level: Not on file   Tobacco Use   • Smoking status: Former Smoker   • Smokeless tobacco: Never Used       FAMILY HISTORY:    History reviewed. No pertinent family history.    REVIEW OF SYSTEMS:    Review of Systems   Constitutional: Negative for activity change, appetite change, chills, diaphoresis, fatigue, fever, unexpected weight gain and unexpected weight loss.    HENT: Positive for hearing loss. Negative for congestion, ear discharge, ear pain, mouth sores, nosebleeds, postnasal drip, rhinorrhea, sinus pressure, sneezing, sore throat, swollen glands and trouble swallowing.    Eyes: Negative for blurred vision, pain, discharge, redness, itching and visual disturbance.   Respiratory: Negative for apnea, cough, choking, chest tightness, shortness of breath, wheezing and stridor.    Cardiovascular: Negative for chest pain, palpitations and leg swelling.   Gastrointestinal: Negative for abdominal distention, abdominal pain, blood in stool, constipation, diarrhea, nausea, vomiting, GERD and indigestion.   Endocrine: Negative for polydipsia and polyuria.   Genitourinary: Negative for decreased urine volume, difficulty urinating, dysuria, flank pain, frequency, hematuria, urgency and urinary incontinence.   Musculoskeletal: Positive for gait problem. Negative for arthralgias, back pain, joint swelling and myalgias.        Confined to bed or wheelchair, non ambulatory   Skin: Negative for color change, dry skin, rash and skin lesions.   Allergic/Immunologic: Negative for environmental allergies.   Neurological: Positive for weakness, memory problem and confusion. Negative for dizziness, seizures and speech difficulty.   Psychiatric/Behavioral: Negative for behavioral problems, dysphoric mood, hallucinations, sleep disturbance and depressed mood. The patient is not nervous/anxious.        PHYSICAL EXAMINATION:   VITAL SIGNS:   Vitals:    04/23/20 1350   BP: 126/74   Pulse: 70   Resp: 18   Temp: 97.2 °F (36.2 °C)   SpO2: 94%   Weight: 56.2 kg (124 lb)       Physical Exam   Constitutional: She appears well-developed and well-nourished.   HENT:   Head: Normocephalic.   Right Ear: External ear normal. No drainage.   Left Ear: External ear normal. No drainage.   Nose: Nose normal.   Eyes: Conjunctivae are normal.   Neck: Normal range of motion.   Cardiovascular: Normal rate, regular  rhythm, normal heart sounds and intact distal pulses.   Pulmonary/Chest: Effort normal. No stridor. No respiratory distress. She has decreased breath sounds. She has no wheezes. She has no rhonchi. She has no rales. She exhibits no tenderness.        Abdominal: Soft. Bowel sounds are normal. She exhibits no distension and no mass. There is no tenderness. No hernia.   Musculoskeletal: She exhibits no edema.   Generalized weakness. Confined to bed or WC.   Neurological: She is alert.   Skin: Skin is warm and dry. No lesion and no rash noted.   Psychiatric: She has a normal mood and affect. Her behavior is normal.   Nursing note and vitals reviewed.        RECORDS REVIEW:   I have reviewed and interpreted the lab test results      Advance Care Planning   ACP discussion was declined by the patient. Patient has an advance directive in EMR which is still valid.     ASSESSMENT     Vascular dementia with behaviors    Impaired Mobility    Age-related debility    PLAN    Dementia  -Stable with no recent acute behaviors. Appetite and weight stable, at 124. Will c/t monitor.     Staff to continue supportive care for all ADLs.     Staff encouraged to keep me informed of any acute changes,  or any new concerning symptoms.    [x]  Discussed Patient in detail with nursing/staff, addressed all needs today.     [x]  Plan of Care Reviewed   []  PT/OT Reviewed   []  Order Changes  []  Discharge Plans Reviewed   [x]  Advance Directive on file with Nursing Home.   [x]  POA on file with Nursing Home.   [x]  Code Status listed: []  Full Code   [x]  DNR     “I confirm accuracy of unchanged data/findings which have been carried forward from previous visit, as well as I have updated appropriately those that have changed.”

## 2020-05-08 NOTE — PROGRESS NOTES
Nursing Home Follow Up Note      Jose D Pennington DO []   ANDREW Cherry [x]  [address] [place] [address]  Phone: [phone]  Fax: [phone] Campbell Larios MD []    Michele Kearney DO []   [address] [place] [address]  Phone: [phone]  Fax: [phone]     PATIENT NAME: Brigette Ramirez                                                                          YOB: 1930           DATE OF SERVICE: 5/6/2020  FACILITY:  [][facility]   [] [place]   [x] [facility]   [] [facility]   [] Other ______________________________________________________________________        CHIEF COMPLAINT:  Follow up COPD       HISTORY OF PRESENT ILLNESS:     COPD  -Patient has had no recent shortness of air, increased work of breathing, or hypoxia. She is stable on current medications.     PAST MEDICAL & SURGICAL HISTORY:   Diabetes  No past surgical history on file.      MEDICATIONS:  I have reviewed and reconciled the patients medication list in the patients chart at the skilled nursing facility today.      ALLERGIES:    Allergies not on file      SOCIAL HISTORY:    Social History     Socioeconomic History   • Marital status:      Spouse name: Not on file   • Number of children: Not on file   • Years of education: Not on file   • Highest education level: Not on file   Tobacco Use   • Smoking status: Former Smoker   • Smokeless tobacco: Never Used       FAMILY HISTORY:    No family history on file.    REVIEW OF SYSTEMS:    Review of Systems   Constitutional: Negative for activity change, appetite change, chills, diaphoresis, fatigue, fever, unexpected weight gain and unexpected weight loss.   HENT: Positive for hearing loss. Negative for congestion, ear discharge, ear pain, mouth sores, nosebleeds, postnasal drip, rhinorrhea, sinus pressure, sneezing, sore throat, swollen glands and trouble swallowing.    Eyes: Negative for blurred vision, pain, discharge, redness, itching and visual  disturbance.   Respiratory: Negative for apnea, cough, choking, chest tightness, shortness of breath, wheezing and stridor.    Cardiovascular: Negative for chest pain, palpitations and leg swelling.   Gastrointestinal: Negative for abdominal distention, abdominal pain, blood in stool, constipation, diarrhea, nausea, vomiting, GERD and indigestion.   Endocrine: Negative for polydipsia and polyuria.   Genitourinary: Negative for decreased urine volume, difficulty urinating, dysuria, flank pain, frequency, hematuria, urgency and urinary incontinence.   Musculoskeletal: Positive for gait problem. Negative for arthralgias, back pain, joint swelling and myalgias.        Confined to bed or wheelchair, non ambulatory   Skin: Negative for color change, dry skin, rash and skin lesions.   Allergic/Immunologic: Negative for environmental allergies.   Neurological: Positive for weakness, memory problem and confusion. Negative for dizziness, seizures and speech difficulty.   Psychiatric/Behavioral: Negative for behavioral problems, dysphoric mood, hallucinations, sleep disturbance and depressed mood. The patient is not nervous/anxious.        PHYSICAL EXAMINATION:   VITAL SIGNS:   Vitals:    05/06/20 0946   BP: 118/75   Pulse: 66   Resp: 18   Temp: 98.6 °F (37 °C)   SpO2: 94%   Weight: 56.7 kg (125 lb)       Physical Exam   Constitutional: She appears well-developed and well-nourished.   HENT:   Head: Normocephalic.   Right Ear: External ear normal. No drainage.   Left Ear: External ear normal. No drainage.   Nose: Nose normal.   Eyes: Conjunctivae are normal.   Neck: Normal range of motion.   Cardiovascular: Normal rate, regular rhythm, normal heart sounds and intact distal pulses.   Pulmonary/Chest: Effort normal. No stridor. No respiratory distress. She has decreased breath sounds. She has no wheezes. She has no rhonchi. She has no rales. She exhibits no tenderness.        Abdominal: Soft. Bowel sounds are normal. She exhibits  no distension and no mass. There is no tenderness. No hernia.   Musculoskeletal: She exhibits no edema.   Generalized weakness. Confined to bed or WC.   Neurological: She is alert.   Skin: Skin is warm and dry. No lesion and no rash noted.   Psychiatric: She has a normal mood and affect. Her behavior is normal.   Nursing note and vitals reviewed.        RECORDS REVIEW:   I have reviewed and interpreted the most recent lab test results      Advance Care Planning   ACP discussion was declined by the patient. Patient has an advance directive in EMR which is still valid.     ASSESSMENT     Chronic Obstructive Bronchitis    Vascular dementia with behaviors    Impaired Mobility    Age-related debility    PLAN    COPD  -No s/s acute exacerbation. No increased work of breathing or increased 02 demand. Continue scheduled Nebs as directed.     Dementia  -Stable with no recent acute behaviors. Appetite and weight stable, at 125. Will c/t monitor.     Staff to continue supportive care for all ADLs.     Staff encouraged to keep me informed of any acute changes,  or any new concerning symptoms.    [x]  Discussed Patient in detail with nursing/staff, addressed all needs today.     [x]  Plan of Care Reviewed   []  PT/OT Reviewed   []  Order Changes  []  Discharge Plans Reviewed   [x]  Advance Directive on file with Nursing Home.   [x]  POA on file with Nursing Home.   [x]  Code Status listed: []  Full Code   [x]  DNR     “I confirm accuracy of unchanged data/findings which have been carried forward from previous visit, as well as I have updated appropriately those that have changed.”

## 2020-05-28 NOTE — PROGRESS NOTES
Nursing Home Follow Up Note      Jose D Pennington DO []   ANDREW Cherry [x]  [address] [place] [address]  Phone: [phone]  Fax: [phone] Campbell Larios MD []    Michele Kearney DO []   [address] [place] [address]  Phone: [phone]  Fax: [phone]     PATIENT NAME: Brigette Ramirez                                                                          YOB: 1930           DATE OF SERVICE: 5/26/2020  FACILITY:  [][facility]   [] [place]   [x] [facility]   [] [facility]   [] Other ______________________________________________________________________        CHIEF COMPLAINT:  Increased confusion    HISTORY OF PRESENT ILLNESS:     Per family, patient had increased confusion today, while they were on FaceTime with her, and they are concerned she has a UTI. In the past, when she has behaved this way, she has had a UTI.    PAST MEDICAL & SURGICAL HISTORY:   Diabetes  No past surgical history on file.      MEDICATIONS:  I have reviewed and reconciled the patients medication list in the patients chart at the skilled nursing facility today.      ALLERGIES:    Allergies not on file      SOCIAL HISTORY:    Social History     Socioeconomic History   • Marital status:      Spouse name: Not on file   • Number of children: Not on file   • Years of education: Not on file   • Highest education level: Not on file   Tobacco Use   • Smoking status: Former Smoker   • Smokeless tobacco: Never Used       FAMILY HISTORY:    No family history on file.    REVIEW OF SYSTEMS:    Review of Systems   Reason unable to perform ROS: ROS per nursing and patient.   Constitutional: Negative for activity change, appetite change, chills, diaphoresis, fatigue, fever, unexpected weight gain and unexpected weight loss.   HENT: Positive for hearing loss. Negative for congestion, ear discharge, ear pain, mouth sores, nosebleeds, postnasal drip, rhinorrhea, sinus pressure, sneezing, sore throat,  swollen glands and trouble swallowing.    Eyes: Negative for blurred vision, pain, discharge, redness, itching and visual disturbance.   Respiratory: Negative for apnea, cough, choking, chest tightness, shortness of breath, wheezing and stridor.    Cardiovascular: Negative for chest pain, palpitations and leg swelling.   Gastrointestinal: Negative for abdominal distention, abdominal pain, blood in stool, constipation, diarrhea, nausea, vomiting, GERD and indigestion.   Endocrine: Negative for polydipsia and polyuria.   Genitourinary: Positive for urinary incontinence. Negative for decreased urine volume, difficulty urinating, dysuria, flank pain, frequency, hematuria and urgency.   Musculoskeletal: Positive for gait problem. Negative for arthralgias, back pain, joint swelling and myalgias.        Confined to bed or wheelchair, non ambulatory   Skin: Negative for color change, dry skin, rash and skin lesions.   Allergic/Immunologic: Negative for environmental allergies.   Neurological: Positive for weakness, memory problem and confusion. Negative for dizziness, seizures, syncope and speech difficulty.   Psychiatric/Behavioral: Negative for behavioral problems, dysphoric mood, hallucinations, sleep disturbance and depressed mood. The patient is not nervous/anxious.        PHYSICAL EXAMINATION:   VITAL SIGNS:   Vitals:    05/26/20 0852   BP: 125/69   Pulse: 71   Resp: 18   Temp: 98.2 °F (36.8 °C)   SpO2: 96%       Physical Exam   Constitutional: She appears well-developed and well-nourished.   HENT:   Head: Normocephalic.   Right Ear: External ear normal. No drainage.   Left Ear: External ear normal. No drainage.   Nose: Nose normal.   Eyes: Conjunctivae are normal.   Neck: Normal range of motion.   Cardiovascular: Normal rate, regular rhythm, normal heart sounds and intact distal pulses.   Pulmonary/Chest: Effort normal. No stridor. No respiratory distress. She has decreased breath sounds. She has no wheezes. She has  no rhonchi. She has no rales. She exhibits no tenderness.        Abdominal: Soft. Bowel sounds are normal. She exhibits no distension and no mass. There is no tenderness. No hernia.   Musculoskeletal: She exhibits no edema.   Generalized weakness. Confined to bed or WC.   Neurological: She is alert.   Skin: Skin is warm and dry. No lesion and no rash noted.   Psychiatric: She has a normal mood and affect. Her behavior is normal. Cognition and memory are impaired.   Confused conversation   Nursing note and vitals reviewed.        RECORDS REVIEW:   I have reviewed and interpreted the most recent lab test results      Advance Care Planning   ACP discussion was declined by the patient. Patient has an advance directive in EMR which is still valid.     ASSESSMENT     Vascular dementia with behaviors    Onset of confusion    UTI symptoms    Impaired Mobility    Age-related debility    PLAN    Dementia/Confusion/UTI symptoms  -Patient with worsening confusion. Check UA with culture, for UTI symptoms. Will follow up with results.  Appetite and weight stable. Will c/t monitor.     Staff to continue supportive care for all ADLs.     Staff encouraged to keep me informed of any acute changes,  or any new concerning symptoms.    [x]  Discussed Patient in detail with nursing/staff, addressed all needs today.     [x]  Plan of Care Reviewed   []  PT/OT Reviewed   [x]  Order Changes  []  Discharge Plans Reviewed   [x]  Advance Directive on file with Nursing Home.   [x]  POA on file with Nursing Home.   [x]  Code Status listed: []  Full Code   [x]  DNR       “I confirm accuracy of unchanged data/findings which have been carried forward from previous visit, as well as I have updated appropriately those that have changed.”

## 2020-06-03 NOTE — PROGRESS NOTES
Nursing Home Follow Up Note      Jose D Pennington DO []   ANDREW Cherry [x]  [address] [place] [address]  Phone: [phone]  Fax: [phone] Campbell Larios MD []    Michele Kearney DO []   [address] [place] [address]  Phone: [phone]  Fax: [phone]     PATIENT NAME: Brigette Ramirez                                                                          YOB: 1930           DATE OF SERVICE: 6/1/2020  FACILITY:  [][facility]   [] [facility]   [x] [facility]   [] [facility]   [] Other ______________________________________________________________________      CHIEF COMPLAINT:  UTI    HISTORY OF PRESENT ILLNESS:     Family felt patient had increased confusion, so UA with culture obtained. It reported E.coli infection, so Omnicef started. She is tolerating it well. Per nursing, they have not noticed any increased confusion or changes with her.     PAST MEDICAL & SURGICAL HISTORY:   Diabetes  No past surgical history on file.      MEDICATIONS:  I have reviewed and reconciled the patients medication list in the patients chart at the skilled nursing facility today.      ALLERGIES:    Allergies not on file      SOCIAL HISTORY:    Social History     Socioeconomic History   • Marital status:      Spouse name: Not on file   • Number of children: Not on file   • Years of education: Not on file   • Highest education level: Not on file   Tobacco Use   • Smoking status: Former Smoker   • Smokeless tobacco: Never Used       FAMILY HISTORY:    No family history on file.    REVIEW OF SYSTEMS:    Review of Systems   Reason unable to perform ROS: ROS per nursing and patient.   Constitutional: Negative for activity change, appetite change, chills, diaphoresis, fatigue, fever, unexpected weight gain and unexpected weight loss.   HENT: Positive for hearing loss. Negative for congestion, ear discharge, ear pain, mouth sores, nosebleeds, postnasal drip, rhinorrhea, sinus pressure,  sneezing, sore throat, swollen glands and trouble swallowing.    Eyes: Negative for blurred vision, pain, discharge, redness, itching and visual disturbance.   Respiratory: Negative for apnea, cough, choking, chest tightness, shortness of breath, wheezing and stridor.    Cardiovascular: Negative for chest pain, palpitations and leg swelling.   Gastrointestinal: Negative for abdominal distention, abdominal pain, blood in stool, constipation, diarrhea, nausea, vomiting, GERD and indigestion.   Endocrine: Negative for polydipsia and polyuria.   Genitourinary: Positive for urinary incontinence. Negative for decreased urine volume, difficulty urinating, dysuria, flank pain, frequency, hematuria and urgency.   Musculoskeletal: Positive for gait problem. Negative for arthralgias, back pain, joint swelling and myalgias.        Confined to bed or wheelchair, non ambulatory   Skin: Negative for color change, dry skin, rash and skin lesions.   Allergic/Immunologic: Negative for environmental allergies.   Neurological: Positive for weakness, memory problem and confusion. Negative for dizziness, seizures, syncope and speech difficulty.   Psychiatric/Behavioral: Negative for behavioral problems, dysphoric mood, hallucinations, sleep disturbance and depressed mood. The patient is not nervous/anxious.        PHYSICAL EXAMINATION:   VITAL SIGNS:   Vitals:    06/01/20 0831   BP: 118/62   Pulse: 84   Resp: 20   Temp: 97.2 °F (36.2 °C)   SpO2: 94%   Weight: 56.7 kg (125 lb)       Physical Exam   Constitutional: She appears well-developed and well-nourished.   HENT:   Head: Normocephalic.   Right Ear: External ear normal. No drainage.   Left Ear: External ear normal. No drainage.   Nose: Nose normal.   Eyes: Conjunctivae are normal.   Neck: Normal range of motion.   Cardiovascular: Normal rate, regular rhythm, normal heart sounds and intact distal pulses.   Pulmonary/Chest: Effort normal. No stridor. No respiratory distress. She has  decreased breath sounds. She has no wheezes. She has no rhonchi. She has no rales. She exhibits no tenderness.        Abdominal: Soft. Bowel sounds are normal. She exhibits no distension and no mass. There is no tenderness. No hernia.   Musculoskeletal: She exhibits no edema.   Generalized weakness. Confined to bed or WC.   Neurological: She is alert.   Skin: Skin is warm and dry. No lesion and no rash noted.   Psychiatric: She has a normal mood and affect. Her behavior is normal. Cognition and memory are impaired.   Confused conversation   Nursing note and vitals reviewed.        RECORDS REVIEW:   I have reviewed and interpreted the most recent lab test results      Advance Care Planning   ACP discussion was declined by the patient. Patient has an advance directive in EMR which is still valid.     ASSESSMENT     UTI    Vascular dementia with behaviors    Onset of confusion    Impaired Mobility    Age-related debility    PLAN    UTI/Dementia/Confusion  -Omnicef started for E Coli UTI, based on sensitivity results.  No increased confusion today. Appetite and weight stable. Will c/t monitor.     Staff to continue supportive care for all ADLs.     Staff encouraged to keep me informed of any acute changes,  or any new concerning symptoms.    [x]  Discussed Patient in detail with nursing/staff, addressed all needs today.     [x]  Plan of Care Reviewed   []  PT/OT Reviewed   [x]  Order Changes  []  Discharge Plans Reviewed   [x]  Advance Directive on file with Nursing Home.   [x]  POA on file with Nursing Home.   [x]  Code Status listed: []  Full Code   [x]  DNR         “I confirm accuracy of unchanged data/findings which have been carried forward from previous visit, as well as I have updated appropriately those that have changed.”

## 2020-06-12 NOTE — PROGRESS NOTES
Nursing Home Follow Up Note      Jose D Pennington DO []   ANDREW Cherry [x]  [address] [place] [address]  Phone: [phone]  Fax: [phone] Campbell Larios MD []    Michele Kearney DO []   [address] [place] [address]  Phone: [phone]  Fax: [phone]     PATIENT NAME: Brigette Ramirez                                                                          YOB: 1930           DATE OF SERVICE: 6/11/2020  FACILITY:  [][facility]   [] [place]   [x] [facility]   [] [facility]   [] Other ______________________________________________________________________      CHIEF COMPLAINT:  Increased anxiety     HISTORY OF PRESENT ILLNESS:     Per nursing, patient has been more anxious today, trying to get out of bed, wanting to be up in her chair all day. Nurse feels she is this way because she is feeling better, since her UTI resolved.     PAST MEDICAL & SURGICAL HISTORY:   Diabetes  No past surgical history on file.      MEDICATIONS:  I have reviewed and reconciled the patients medication list in the patients chart at the skilled nursing facility today.      ALLERGIES:    Allergies not on file      SOCIAL HISTORY:    Social History     Socioeconomic History   • Marital status:      Spouse name: Not on file   • Number of children: Not on file   • Years of education: Not on file   • Highest education level: Not on file   Tobacco Use   • Smoking status: Former Smoker   • Smokeless tobacco: Never Used       FAMILY HISTORY:    No family history on file.    REVIEW OF SYSTEMS:    Review of Systems   Reason unable to perform ROS: ROS per nursing and patient.   Constitutional: Negative for activity change, appetite change, chills, diaphoresis, fatigue, fever, unexpected weight gain and unexpected weight loss.   HENT: Positive for hearing loss. Negative for congestion, ear discharge, ear pain, mouth sores, nosebleeds, postnasal drip, rhinorrhea, sinus pressure, sneezing, sore  throat, swollen glands and trouble swallowing.    Eyes: Positive for visual disturbance. Negative for pain, discharge, redness and itching.   Respiratory: Negative for cough, choking, chest tightness, shortness of breath, wheezing and stridor.    Cardiovascular: Negative for chest pain, palpitations and leg swelling.   Gastrointestinal: Negative for abdominal distention, abdominal pain, blood in stool, constipation, diarrhea, nausea, vomiting, GERD and indigestion.   Endocrine: Negative for polydipsia and polyuria.   Genitourinary: Positive for urinary incontinence. Negative for decreased urine volume, difficulty urinating, dysuria, flank pain, frequency, hematuria and urgency.   Musculoskeletal: Positive for gait problem. Negative for arthralgias, back pain, joint swelling and myalgias.        Confined to bed or wheelchair, non ambulatory   Skin: Negative for color change, dry skin, rash and skin lesions.   Allergic/Immunologic: Negative for environmental allergies.   Neurological: Positive for weakness, memory problem and confusion. Negative for dizziness, seizures, syncope and speech difficulty.   Psychiatric/Behavioral: Positive for positive for hyperactivity. Negative for behavioral problems, dysphoric mood, hallucinations, sleep disturbance and depressed mood. The patient is nervous/anxious.        PHYSICAL EXAMINATION:   VITAL SIGNS:   Vitals:    06/11/20 1158   BP: 110/72   Pulse: 74   Resp: 18   Temp: 98 °F (36.7 °C)   SpO2: 97%   Weight: 57.2 kg (126 lb)       Physical Exam   Constitutional: Vital signs are normal. She appears well-developed and well-nourished.   HENT:   Head: Normocephalic.   Right Ear: External ear normal. No drainage.   Left Ear: External ear normal. No drainage.   Nose: Nose normal.   Eyes: Conjunctivae are normal.   Neck: Normal range of motion.   Cardiovascular: Normal rate, regular rhythm, normal heart sounds and intact distal pulses.   Pulmonary/Chest: Effort normal. No stridor. No  respiratory distress. She has decreased breath sounds. She has no wheezes. She has no rhonchi. She has no rales. She exhibits no tenderness.        Abdominal: Soft. Bowel sounds are normal. She exhibits no distension and no mass. There is no tenderness. No hernia.   Musculoskeletal: She exhibits no edema.   Generalized weakness. Confined to bed or WC.   Neurological: She is alert.   Skin: Skin is warm and dry. No lesion and no rash noted.   Psychiatric: She has a normal mood and affect. She is hyperactive. Cognition and memory are impaired.   Patient restless in chair and conversing very frequently with staff and the residents   Nursing note and vitals reviewed.        RECORDS REVIEW:   I have reviewed and interpreted the most recent lab test results      Advance Care Planning   ACP discussion was declined by the patient. Patient has an advance directive in EMR which is still valid.     ASSESSMENT     Anxiety    Restless    Vascular dementia with behaviors    Impaired Mobility    Age-related debility    PLAN    Anxiety/restless/Dementia  -Patient more active today than normal, anxious, but in no acute distress. No acute behaviors or increased confusion. Will c/t monitor. Give Haldol 2.5 mg prn daily, for anxiety and agitation.     Staff to continue supportive care for all ADLs.     Staff encouraged to keep me informed of any acute changes,  or any new concerning symptoms.    [x]  Discussed Patient in detail with nursing/staff, addressed all needs today.     [x]  Plan of Care Reviewed   []  PT/OT Reviewed   [x]  Order Changes  []  Discharge Plans Reviewed   [x]  Advance Directive on file with Nursing Home.   [x]  POA on file with Nursing Home.   [x]  Code Status listed: []  Full Code   [x]  DNR         “I confirm accuracy of unchanged data/findings which have been carried forward from previous visit, as well as I have updated appropriately those that have changed.”

## 2020-07-01 NOTE — PROGRESS NOTES
Nursing Home Follow Up Note      Jose D Pennington DO []   ANDREW Cherry [x]  [address] [place] [address]  Phone: [phone]  Fax: [phone] Campbell Larios MD []    Michele Kearnye DO []   [address] [place] [address]  Phone: [phone]  Fax: [phone]     PATIENT NAME: Brigette Ramirez                                                                          YOB: 1930           DATE OF SERVICE: 7/1/2020  FACILITY:  [][facility]   [] [facility]   [x] [facility]   [] [facility]   [] Other ______________________________________________________________________      CHIEF COMPLAINT:  Follow up anxiety    HISTORY OF PRESENT ILLNESS:   Patient with increased anxiety and restlessness, a couple weeks ago. No medications adjusted, just given prn medications. Per nursing, she has not had any more increased anxiety and restlessness. She has been doing well, with appetite and weight stable.       PAST MEDICAL & SURGICAL HISTORY:   Diabetes  No past surgical history on file.      MEDICATIONS:  I have reviewed and reconciled the patients medication list in the patients chart at the skilled nursing facility today.      ALLERGIES:    Allergies not on file      SOCIAL HISTORY:    Social History     Socioeconomic History   • Marital status:      Spouse name: Not on file   • Number of children: Not on file   • Years of education: Not on file   • Highest education level: Not on file   Tobacco Use   • Smoking status: Former Smoker   • Smokeless tobacco: Never Used       FAMILY HISTORY:    No family history on file.    REVIEW OF SYSTEMS:    Review of Systems   Reason unable to perform ROS: ROS per nursing and patient.   Constitutional: Negative for activity change, appetite change, chills, diaphoresis, fatigue, fever, unexpected weight gain and unexpected weight loss.   HENT: Positive for hearing loss. Negative for congestion, ear discharge, ear pain, mouth sores, nosebleeds, postnasal  drip, rhinorrhea, sinus pressure, sneezing, sore throat, swollen glands and trouble swallowing.    Eyes: Positive for visual disturbance. Negative for pain, discharge, redness and itching.   Respiratory: Negative for cough, choking, chest tightness, shortness of breath, wheezing and stridor.    Cardiovascular: Negative for chest pain, palpitations and leg swelling.   Gastrointestinal: Negative for abdominal distention, abdominal pain, blood in stool, constipation, diarrhea, nausea, vomiting, GERD and indigestion.   Endocrine: Negative for polydipsia and polyuria.   Genitourinary: Positive for urinary incontinence. Negative for decreased urine volume, difficulty urinating, dysuria, flank pain, frequency, hematuria and urgency.   Musculoskeletal: Positive for gait problem. Negative for arthralgias, back pain, joint swelling and myalgias.        Confined to bed or wheelchair, non ambulatory   Skin: Negative for color change, dry skin, rash and skin lesions.   Allergic/Immunologic: Negative for environmental allergies.   Neurological: Positive for weakness, memory problem and confusion. Negative for dizziness, seizures, syncope and speech difficulty.   Psychiatric/Behavioral: Negative for behavioral problems, dysphoric mood, hallucinations, sleep disturbance, negative for hyperactivity and depressed mood. The patient is not nervous/anxious.        PHYSICAL EXAMINATION:   VITAL SIGNS:   Vitals:    06/30/20 0826   BP: 127/61   Pulse: 68   Resp: 18   Temp: 98.4 °F (36.9 °C)   SpO2: 97%   Weight: 57.2 kg (126 lb)       Physical Exam   Constitutional: Vital signs are normal. She appears well-developed and well-nourished.   HENT:   Head: Normocephalic.   Right Ear: External ear normal. No drainage.   Left Ear: External ear normal. No drainage.   Nose: Nose normal.   Eyes: Conjunctivae are normal.   Neck: Normal range of motion.   Cardiovascular: Normal rate, regular rhythm, normal heart sounds and intact distal pulses.    Pulmonary/Chest: Effort normal. No stridor. No respiratory distress. She has decreased breath sounds. She has no wheezes. She has no rhonchi. She has no rales. She exhibits no tenderness.        Abdominal: Soft. Bowel sounds are normal. She exhibits no distension and no mass. There is no tenderness. No hernia.   Musculoskeletal: She exhibits no edema.   Generalized weakness. Confined to bed or WC.   Neurological: She is alert.   Skin: Skin is warm and dry. No lesion and no rash noted.   Psychiatric: She has a normal mood and affect. Her speech is normal and behavior is normal. Cognition and memory are impaired.   Confused conversation   Nursing note and vitals reviewed.        RECORDS REVIEW:   I have reviewed and interpreted the most recent lab test results          ASSESSMENT     Anxiety    Restless    Impaired Mobility    Age-related debility    PLAN    Anxiety/restless  -Anxiety and restlessness resolved. She was calm and pleasant during visit. Appetite and weight stable. Will monitor.    Staff to continue supportive care for all ADLs.     Staff encouraged to keep me informed of any acute changes,  or any new concerning symptoms.    [x]  Discussed Patient in detail with nursing/staff, addressed all needs today.     [x]  Plan of Care Reviewed   []  PT/OT Reviewed   [x]  Order Changes  []  Discharge Plans Reviewed   [x]  Advance Directive on file with Nursing Home.   [x]  POA on file with Nursing Home.   [x]  Code Status listed: []  Full Code   [x]  DNR       “I confirm accuracy of unchanged data/findings which have been carried forward from previous visit, as well as I have updated appropriately those that have changed.”

## 2020-07-11 NOTE — PROGRESS NOTES
Nursing Home Follow Up Note      Jose D Pennington DO []   ANDREW Cherry [x]  [address] [place] [address]  Phone: [phone]  Fax: [phone] Campbell Larios MD []    Michele Kearney DO []   [address] [place] [address]  Phone: [phone]  Fax: [phone]     PATIENT NAME: Brigette Ramirez                                                                          YOB: 1930           DATE OF SERVICE: 7/8/2020  FACILITY:  [][facility]   [] [facility]   [x] [facility]   [] [facility]   [] Other ______________________________________________________________________      CHIEF COMPLAINT:  Follow up COPD    HISTORY OF PRESENT ILLNESS:   Follow up for assessment of any worsening symptoms of COPD. During nursing she is has been doing very well, with no increased work of breathing or 02 demand. She has been doing well, with appetite and weight stable. Patient is very pleasant during visit, has no complaints.       PAST MEDICAL & SURGICAL HISTORY:   Diabetes  No past surgical history on file.      MEDICATIONS:  I have reviewed and reconciled the patients medication list in the patients chart at the skilled nursing facility today.      ALLERGIES:    Allergies not on file      SOCIAL HISTORY:    Social History     Socioeconomic History   • Marital status:      Spouse name: Not on file   • Number of children: Not on file   • Years of education: Not on file   • Highest education level: Not on file   Tobacco Use   • Smoking status: Former Smoker   • Smokeless tobacco: Never Used       FAMILY HISTORY:    No family history on file.    REVIEW OF SYSTEMS:    Review of Systems   Reason unable to perform ROS: ROS per nursing and patient.   Constitutional: Negative for activity change, appetite change, chills, diaphoresis, fatigue, fever, unexpected weight gain and unexpected weight loss.   HENT: Positive for hearing loss. Negative for congestion, ear discharge, ear pain, mouth sores,  nosebleeds, postnasal drip, rhinorrhea, sinus pressure, sneezing, sore throat, swollen glands and trouble swallowing.    Eyes: Positive for visual disturbance. Negative for pain, discharge, redness and itching.   Respiratory: Negative for cough, choking, chest tightness, shortness of breath, wheezing and stridor.    Cardiovascular: Negative for chest pain, palpitations and leg swelling.   Gastrointestinal: Negative for abdominal distention, abdominal pain, blood in stool, constipation, diarrhea, nausea, vomiting, GERD and indigestion.   Endocrine: Negative for polydipsia and polyuria.   Genitourinary: Positive for urinary incontinence. Negative for decreased urine volume, difficulty urinating, dysuria, flank pain, frequency, hematuria and urgency.   Musculoskeletal: Positive for gait problem. Negative for arthralgias, back pain, joint swelling and myalgias.        Confined to bed or wheelchair, non ambulatory   Skin: Negative for color change, dry skin, rash and skin lesions.   Allergic/Immunologic: Negative for environmental allergies.   Neurological: Positive for weakness, memory problem and confusion. Negative for dizziness, seizures, syncope and speech difficulty.   Psychiatric/Behavioral: Negative for behavioral problems, dysphoric mood, hallucinations, sleep disturbance, negative for hyperactivity and depressed mood. The patient is not nervous/anxious.        PHYSICAL EXAMINATION:   VITAL SIGNS:   Vitals:    07/08/20 1045   BP: 120/64   Pulse: 61   Resp: 20   Temp: 98 °F (36.7 °C)   SpO2: 97%   Weight: 57.2 kg (126 lb)       Physical Exam   Constitutional: Vital signs are normal. She appears well-developed and well-nourished.   HENT:   Head: Normocephalic.   Right Ear: External ear normal. No drainage.   Left Ear: External ear normal. No drainage.   Nose: Nose normal.   Eyes: Conjunctivae are normal.   Neck: Normal range of motion.   Cardiovascular: Normal rate, regular rhythm, normal heart sounds and intact  distal pulses.   Pulmonary/Chest: Effort normal. No stridor. No respiratory distress. She has decreased breath sounds. She has no wheezes. She has no rhonchi. She has no rales. She exhibits no tenderness.        Abdominal: Soft. Bowel sounds are normal. She exhibits no distension and no mass. There is no tenderness. No hernia.   Musculoskeletal: She exhibits no edema.   Generalized weakness. Confined to bed or WC.   Neurological: She is alert.   Skin: Skin is warm and dry. No lesion and no rash noted.   Psychiatric: She has a normal mood and affect. Her speech is normal and behavior is normal. Cognition and memory are impaired.   Confused conversation   Nursing note and vitals reviewed.        RECORDS REVIEW:   I have reviewed and interpreted the most recent lab test results          ASSESSMENT     COPD    Vascular dementia    Impaired Mobility    Age-related debility    PLAN    COPD  -No recent signs and symptoms of acute exacerbation; no increased 02 demand, work of breathing, coughing, etc. Will c/t monitor.     Dementia  -No acute behaviors. Appetite and weight stable. Will monitor.    Staff to continue supportive care for all ADLs.     Staff encouraged to keep me informed of any acute changes,  or any new concerning symptoms.    [x]  Discussed Patient in detail with nursing/staff, addressed all needs today.     [x]  Plan of Care Reviewed   []  PT/OT Reviewed   []  Order Changes  []  Discharge Plans Reviewed   [x]  Advance Directive on file with Nursing Home.   [x]  POA on file with Nursing Home.   [x]  Code Status listed: []  Full Code   [x]  DNR       “I confirm accuracy of unchanged data/findings which have been carried forward from previous visit, as well as I have updated appropriately those that have changed.”

## 2020-08-07 NOTE — PROGRESS NOTES
Nursing Home Follow Up Note      Jose D Pennington DO []   ANDREW Cherry [x]  [address] [place] [address]  Phone: [phone]  Fax: [phone] Campbell Larios MD []    Michele Kearney DO []   [address] [place] [address]  Phone: [phone]  Fax: [phone]     PATIENT NAME: Brigette Ramirez                                                                          YOB: 1930           DATE OF SERVICE: 8/4/2020  FACILITY:  [][facility]   [] [facility]   [x] [facility]   [] [facility]   [] Other ______________________________________________________________________      CHIEF COMPLAINT:  Left ear pain    HISTORY OF PRESENT ILLNESS:   Per nursing, family called nurses station to report that patient had reported left ear pain to them, during and FaceTime visit. When asked, patient does report that her left ear hurts.     PAST MEDICAL & SURGICAL HISTORY:   Diabetes  No past surgical history on file.      MEDICATIONS:  I have reviewed and reconciled the patients medication list in the patients chart at the skilled nursing facility today.      ALLERGIES:    Allergies not on file      SOCIAL HISTORY:    Social History     Socioeconomic History   • Marital status:      Spouse name: Not on file   • Number of children: Not on file   • Years of education: Not on file   • Highest education level: Not on file   Tobacco Use   • Smoking status: Former Smoker   • Smokeless tobacco: Never Used       FAMILY HISTORY:    No family history on file.    REVIEW OF SYSTEMS:    Review of Systems   Reason unable to perform ROS: ROS per nursing and patient.   Constitutional: Negative for activity change, appetite change, chills, diaphoresis, fatigue, fever, unexpected weight gain and unexpected weight loss.   HENT: Positive for ear pain and hearing loss. Negative for congestion, ear discharge, mouth sores, nosebleeds, postnasal drip, rhinorrhea, sinus pressure, sneezing, sore throat, swollen glands  and trouble swallowing.    Eyes: Positive for visual disturbance. Negative for pain, discharge, redness and itching.   Respiratory: Negative for cough, choking, chest tightness, shortness of breath, wheezing and stridor.    Cardiovascular: Negative for chest pain, palpitations and leg swelling.   Gastrointestinal: Negative for abdominal distention, abdominal pain, blood in stool, constipation, diarrhea, nausea, vomiting, GERD and indigestion.   Endocrine: Negative for polydipsia and polyuria.   Genitourinary: Positive for urinary incontinence. Negative for decreased urine volume, difficulty urinating, dysuria, flank pain, frequency, hematuria and urgency.   Musculoskeletal: Positive for gait problem. Negative for arthralgias, back pain, joint swelling and myalgias.        Confined to bed or wheelchair, non ambulatory   Skin: Negative for color change, dry skin, rash and skin lesions.   Allergic/Immunologic: Negative for environmental allergies.   Neurological: Positive for weakness, memory problem and confusion. Negative for dizziness, seizures, syncope and speech difficulty.   Psychiatric/Behavioral: Negative for behavioral problems, dysphoric mood, hallucinations, sleep disturbance, negative for hyperactivity and depressed mood. The patient is not nervous/anxious.        PHYSICAL EXAMINATION:   VITAL SIGNS:   Vitals:    08/04/20 1214   BP: 134/68   Pulse: 68   Resp: 18   Temp: 97.5 °F (36.4 °C)   SpO2: 96%   Weight: 57.6 kg (127 lb)       Physical Exam   Constitutional: Vital signs are normal. She appears well-developed and well-nourished.   HENT:   Head: Normocephalic.   Right Ear: External ear and ear canal normal. No drainage.   Left Ear: External ear normal. No drainage. An impacted cerumen is present.  Nose: Nose normal.   Eyes: Conjunctivae are normal.   Neck: Normal range of motion.   Cardiovascular: Normal rate, regular rhythm, normal heart sounds and intact distal pulses.   Pulmonary/Chest: Effort normal.  No stridor. No respiratory distress. She has decreased breath sounds. She has no wheezes. She has no rhonchi. She has no rales. She exhibits no tenderness.        Abdominal: Soft. Bowel sounds are normal. She exhibits no distension and no mass. There is no tenderness. No hernia.   Musculoskeletal: She exhibits no edema.   Generalized weakness. Confined to bed or WC.   Neurological: She is alert.   Skin: Skin is warm and dry. No lesion and no rash noted.   Psychiatric: She has a normal mood and affect. Her speech is normal and behavior is normal. Cognition and memory are impaired.   Confused conversation   Nursing note and vitals reviewed.        RECORDS REVIEW:   I have reviewed and interpreted the most recent lab test results          ASSESSMENT     Left cerumen impaction    Dementia    Impaired Mobility      PLAN    Left cerumen impaction  -Apply Debrox to left ear, 5 drops bid x 5 days then irrigate. Will follow up to assess ear after.     Dementia  -No acute behaviors. Appetite and weight stable. Will monitor.    Staff to continue supportive care for all ADLs.     Staff encouraged to keep me informed of any acute changes,  or any new concerning symptoms.    [x]  Discussed Patient in detail with nursing/staff, addressed all needs today.     [x]  Plan of Care Reviewed   []  PT/OT Reviewed   []  Order Changes  []  Discharge Plans Reviewed   [x]  Advance Directive on file with Nursing Home.   [x]  POA on file with Nursing Home.   [x]  Code Status listed: []  Full Code   [x]  DNR     “I confirm accuracy of unchanged data/findings which have been carried forward from previous visit, as well as I have updated appropriately those that have changed.”

## 2020-09-01 NOTE — TELEPHONE ENCOUNTER
[facility] REFILL REQUEST FOR RITALIN 5 MG.    DIRECTIONS: TAKE 1 TABLET BY MOUTH DAILY.

## 2020-09-14 NOTE — PROGRESS NOTES
Nursing Home Follow Up Note      Jose D Pennington DO []   ANDREW Cherry [x]  [address] [place] [address]  Phone: [phone]  Fax: [phone] Campbell Larios MD []    Michele Kearney DO []   [address] [place] [address]  Phone: [phone]  Fax: [phone]     PATIENT NAME: Brigette Ramirez                                                                          YOB: 1930           DATE OF SERVICE: 9/14/2020  FACILITY:  [][facility]   [] [facility]   [x] [facility]   [] [facility]   [] Other ______________________________________________________________________      CHIEF COMPLAINT:  Follow ED visit    HISTORY OF PRESENT ILLNESS:   Patient sent to the ED yesterday, because staff had trouble arousing her. Nurse reported that patient was having increased lethargy, sweating and clamminess, and reporting she did not feel well, so family wanted her sent to the hospital. Her VS were all normal and her FSBS was 152. She was diagnosed with UTI and started on Amoxicillin 500 mg bid x 10 days. Per nursing, she has had no lethargy or sweating today, and she has had no complaints. She has no complaints today.    PAST MEDICAL & SURGICAL HISTORY:   Diabetes  No past surgical history on file.      MEDICATIONS:  I have reviewed and reconciled the patients medication list in the patients chart at the skilled nursing facility today.      ALLERGIES:    Not on File      SOCIAL HISTORY:    Social History     Socioeconomic History   • Marital status:      Spouse name: Not on file   • Number of children: Not on file   • Years of education: Not on file   • Highest education level: Not on file   Tobacco Use   • Smoking status: Former Smoker   • Smokeless tobacco: Never Used       FAMILY HISTORY:    No family history on file.    REVIEW OF SYSTEMS:    Review of Systems   Reason unable to perform ROS: ROS per nursing and patient.   Constitutional: Negative for activity change, appetite change,  chills, diaphoresis, fatigue, fever, unexpected weight gain and unexpected weight loss.   HENT: Positive for hearing loss. Negative for congestion, ear discharge, ear pain, mouth sores, nosebleeds, postnasal drip, rhinorrhea, sinus pressure, sneezing, sore throat, swollen glands and trouble swallowing.    Eyes: Positive for visual disturbance. Negative for pain, discharge, redness and itching.   Respiratory: Negative for cough, choking, chest tightness, shortness of breath, wheezing and stridor.    Cardiovascular: Negative for chest pain, palpitations and leg swelling.   Gastrointestinal: Negative for abdominal distention, abdominal pain, blood in stool, constipation, diarrhea, nausea, vomiting, GERD and indigestion.   Endocrine: Negative for polydipsia and polyuria.   Genitourinary: Positive for urinary incontinence. Negative for decreased urine volume, difficulty urinating, dysuria, flank pain, frequency, hematuria and urgency.   Musculoskeletal: Positive for gait problem. Negative for arthralgias, back pain, joint swelling and myalgias.        Confined to bed or wheelchair, non ambulatory   Skin: Negative for color change, dry skin, rash and skin lesions.   Allergic/Immunologic: Negative for environmental allergies.   Neurological: Positive for weakness, memory problem and confusion. Negative for dizziness, seizures, syncope and speech difficulty.   Psychiatric/Behavioral: Negative for behavioral problems, dysphoric mood, hallucinations, sleep disturbance, negative for hyperactivity and depressed mood. The patient is not nervous/anxious.        PHYSICAL EXAMINATION:   VITAL SIGNS:   Vitals:    09/14/20 1526   BP: 136/68   Pulse: 67   Resp: 20   Temp: 98.2 °F (36.8 °C)   SpO2: 97%   Weight: 59 kg (130 lb)       Physical Exam   Constitutional: Vital signs are normal. She appears well-developed and well-nourished.   HENT:   Head: Normocephalic.   Right Ear: External ear normal. Decreased hearing is noted.   Left  Ear: External ear normal. Decreased hearing is noted.   Nose: Nose normal.   Eyes: Conjunctivae are normal.   Neck: Normal range of motion.   Cardiovascular: Normal rate, regular rhythm and normal heart sounds.   Pulmonary/Chest: Effort normal. No stridor. No respiratory distress. She has decreased breath sounds. She has no wheezes. She has no rhonchi. She has no rales. She exhibits no tenderness.        Abdominal: Soft. Bowel sounds are normal. She exhibits no distension and no mass. There is no abdominal tenderness. No hernia.   Musculoskeletal:      Comments: Generalized weakness. Confined to bed or WC.   Neurological: She is alert.   Skin: Skin is warm and dry. No lesion and no rash noted.   Psychiatric: Her speech is normal and behavior is normal. Cognition and memory are impaired.   Confused conversation   Nursing note and vitals reviewed.        RECORDS REVIEW:   I have reviewed and interpreted the most recent lab test results          ASSESSMENT     UTI    Improved mental status    History Fatigue    Impaired Mobility      PLAN    UTI/Improved mental status/history lethargy and diaphoresis  -Patient sent to ED yesterday morning for altered mental status, fatigue and diaphoresis.  She was diagnosed with UTI and started on amoxicillin 500 mg twice daily x10 days.  Continue until completed.  She was sent back to facility later that day and per nursing has had no increased fatigue or diaphoresis since.  She has also had no altered mental status.  She is pleasant during visit today with normal confused conversation.  We will continue to monitor.    Staff to continue supportive care for all ADLs.     Staff encouraged to keep me informed of any acute changes,  or any new concerning symptoms.    [x]  Discussed Patient in detail with nursing/staff, addressed all needs today.     [x]  Plan of Care Reviewed   []  PT/OT Reviewed   []  Order Changes  []  Discharge Plans Reviewed   [x]  Advance Directive on file with  Nursing Home.   [x]  POA on file with Nursing Home.   [x]  Code Status listed: []  Full Code   [x]  DNR     “I confirm accuracy of unchanged data/findings which have been carried forward from previous visit, as well as I have updated appropriately those that have changed.”

## 2020-09-23 NOTE — PROGRESS NOTES
Nursing Home Progress Note        Jose D Pennington DO [x]  ANDREW Cherry []  [address] [place] [address]  Phone: [phone]  Fax: [phone] Campbell Larios MD []  Michele Kearney DO []  [address] [place] [address]  Phone: [phone]  Fax: [phone]     PATIENT NAME: Brigette Ramirez                                                                          YOB: 1930           DATE OF SERVICE: 9/16/2020  FACILITY: []  [facility]  [] [facility]  [x]  [facility]  [] [facility]  []  Other ______________________________________________________________________     CHIEF COMPLAINT:  Impaired mobility and ADLs/chronic respiratory failure with hypoxia/vascular dementia/type 2 diabetes mellitus treated with insulin/hypertension/long-term care needs      HISTORY OF PRESENT ILLNESS:   [x]  Follow Up visit for coordination of long term care issues and chronic medical management of Diagnoses and all orders for this visit:    Impaired mobility and ADLs    Age-related physical debility    Essential hypertension    Type 2 diabetes mellitus treated with insulin (CMS/HCC)    Vascular dementia without behavioral disturbance (CMS/HCC)    Anemia of chronic disease with mixed iron deficiency    GERD without esophagitis    Chronic respiratory failure with hypoxia (CMS/HCC)    I have discussed patient in detail with nursing/staff, as well as reviewed notes.  No reports of any falls or injuries.  She was recently evaluated in a local emergency department secondary to a vague constellation of symptoms, she was subsequently diagnosed with urinary tract infection and returned back to the facility.  She has not demonstrated any further episodes of the same symptoms, no reports of fever, chills or night sweats.  No hematuria.  She has tolerated antibiotics without difficulty.    Vital signs have been stable.    No reports of any cyclical/persistent hypoglycemic episodes.    No acute behavioral  changes noted today.    No reports of any focal aspiration.    She has not required any increased need of oxygen supplementation.      PAST MEDICAL & SURGICAL HISTORY:   No past medical history on file.   No past surgical history on file.      MEDICATIONS:  I have reviewed and reconciled the patients medication list in the patients chart at the [facility] nursing [facility] today.      ALLERGIES:    Not on File      SOCIAL HISTORY:    Social History     Socioeconomic History   • Marital status:      Spouse name: Not on file   • Number of children: Not on file   • Years of education: Not on file   • Highest education level: Not on file   Tobacco Use   • Smoking status: Former Smoker   • Smokeless tobacco: Never Used       FAMILY HISTORY:    No family history on file.    REVIEW OF SYSTEMS:  Patient is a poor historian, review of systems obtained on direct discussion with patient's treating nurse, as well as staff and review of records.  Review of Systems  · Appetite: Fair [x]   Good []   Poor []   Weight Loss []  [x]  Weight Stable   Unavoidable Weight Loss []  Tolerating Tube Feeding []    Supplements Provided []   · Constitutional: Negative for fever, chills, diaphoresis or fatigue and weight change.     PHYSICAL EXAMINATION:   VITAL SIGNS:   Vitals:    09/17/20 1604   BP: 129/72   Pulse: 71   Resp: 20   Temp: 97.9 °F (36.6 °C)   SpO2: 97%       Physical Exam    General Appearance:  [x]  Alert   [x]  Oriented x person  [x]  No acute distress     [x]  Confused at baseline []  Disoriented   []  Comatose   Head:  Atraumatic and normocephalic, without obvious abnormality.   Eyes:         PERRLA, conjunctivae and sclerae normal, no Icterus. No pallor. Extra-occular movements are within normal limits.   Ears:  Ears appear intact with no abnormalities noted.  Severely decreased hearing bilaterally   Throat: No oral lesions, no thrush, oral mucosa moist.   Neck: Supple, trachea midline, no thyromegaly, no carotid bruit.    Back:   No kyphoscoliosis. No tenderness to palpation.   Lungs:   Chest shape is normal. Breath sounds heard bilaterally equally.  No wheezing.    Long expiratory phase bilaterally.  Rhonchus referred congestive changes throughout all lung fields, partially cleared with heavy cough.   Heart:  Normal S1 and S2, no murmur, no gallop, no rub. No JVD.   Abdomen:   Normal bowel sounds, no masses, no organomegaly. Soft, non-tender, non-distended, no guarding    Extremities: Moves all extremities at times, without edema, cyanosis or clubbing.    Thin, frail build.   Poor core strength and stability.   Pulses: Pulses palpable and equal bilaterally.   Skin: No bleeding or rash.  Generalized dry skin noted.  Age-related atrophy of skin.   Neurologic: [x] Normal speech []  Normal mental status    [x] Cranial nerves II through XII intact, severely limited hearing capacity.  [x]  No anosmia [x]  DTR 2+ [x]  Proprioception intact  [x]  Age related motor/sensory deficits.      Psych/Mood:                    [x]  No acute changes []  Depressed  Urinary:                            []  Continent  [x]  Incontinent []  Retention  []  F/C      []  UTI w/treatment in progress         ASSESSMENT     Diagnoses and all orders for this visit:    1. Impaired mobility and ADLs (Primary)    2. Age-related physical debility    3. Essential hypertension    4. Type 2 diabetes mellitus treated with insulin (CMS/[facility])    5. Vascular dementia without behavioral disturbance (CMS/[facility])    6. Anemia of chronic disease with mixed iron deficiency    7. GERD without esophagitis    8. Chronic respiratory failure with hypoxia (CMS/[facility])          PLAN  Patient certainly demonstrates no findings of acute cystitis or sepsis at this time.  No findings of decompensated hemodynamic stability.  She is tolerating all p.o. intake.  Nutritionally appears at baseline.    Continue supportive care for all mobilization/transfer needs, as well as ADL assistance.    Blood  pressure is at goal, vital signs do indeed demonstrate hemodynamic stability.    Continue to avoid prolonged fasting periods as best able, maintain appropriate hydration status.  Continue to adjust her diabetes treatment regimen as needed to maximize her blood glucose control.    Continue to monitor blood counts.    Continue aspiration precautions.    Continue current respiratory treatment regimen, oxygen supplementation being provided.    Surveillance labs as indicated.    [x]  Discussed Patient in detail with nursing/staff, addressed all needs today.     [x]  Plan of Care Reviewed   []  PT/OT Reviewed   []  Order Changes  []  Discharge Plans Reviewed   []  Code Status Changes    I have reviewed and updated all copied forward information, as appropriate.  I attest to the accuracy and relevance of any unchanged information.       Jose D Pennington DO  9/16/2020

## 2020-10-08 NOTE — PROGRESS NOTES
Nursing Home Follow Up Note      Jose D Pennington DO []   ANDREW Cherry [x]  [address] [place] [address]  Phone: [phone]  Fax: [phone] Campbell Larios MD []    Michele Kearney DO []   [address] [place] [address]  Phone: [phone]  Fax: [phone]     PATIENT NAME: Brigette Ramirez                                                                          YOB: 1930           DATE OF SERVICE: 10/6/2020  FACILITY:  [][facility]   [] [facility]   [x] [facility]   [] [facility]   [] Other ______________________________________________________________________      CHIEF COMPLAINT:  Decreased hearing    HISTORY OF PRESENT ILLNESS:   Per nursing, family patient was not hearing as well as usual on her FaceTime visit the other day.  They are concerned cerumen impaction or ear infection and would like her assesed.    PAST MEDICAL & SURGICAL HISTORY:   Diabetes  No past surgical history on file.      MEDICATIONS:  I have reviewed and reconciled the patients medication list in the patients chart at the skilled nursing facility today.      ALLERGIES:    Not on File      SOCIAL HISTORY:    Social History     Socioeconomic History   • Marital status:      Spouse name: Not on file   • Number of children: Not on file   • Years of education: Not on file   • Highest education level: Not on file   Tobacco Use   • Smoking status: Former Smoker   • Smokeless tobacco: Never Used       FAMILY HISTORY:    No family history on file.    REVIEW OF SYSTEMS:    Review of Systems   Reason unable to perform ROS: ROS per nursing and patient.   Constitutional: Negative for activity change, appetite change, chills, diaphoresis, fatigue, fever, unexpected weight gain and unexpected weight loss.   HENT: Positive for hearing loss. Negative for congestion, ear discharge, ear pain, mouth sores, nosebleeds, postnasal drip, rhinorrhea, sinus pressure, sneezing, sore throat, swollen glands and trouble  swallowing.    Eyes: Positive for visual disturbance. Negative for pain, discharge, redness and itching.   Respiratory: Negative for cough, choking, chest tightness, shortness of breath, wheezing and stridor.    Cardiovascular: Negative for chest pain, palpitations and leg swelling.   Gastrointestinal: Negative for abdominal distention, abdominal pain, blood in stool, constipation, diarrhea, nausea, vomiting, GERD and indigestion.   Endocrine: Negative for polydipsia and polyuria.   Genitourinary: Positive for urinary incontinence. Negative for decreased urine volume, difficulty urinating, dysuria, flank pain, frequency, hematuria and urgency.   Musculoskeletal: Positive for gait problem. Negative for arthralgias, back pain, joint swelling and myalgias.        Confined to bed or wheelchair, non ambulatory   Skin: Negative for color change, dry skin, rash and skin lesions.   Allergic/Immunologic: Negative for environmental allergies.   Neurological: Positive for weakness, memory problem and confusion. Negative for dizziness, seizures, syncope and speech difficulty.   Psychiatric/Behavioral: Negative for behavioral problems, dysphoric mood, hallucinations, sleep disturbance, negative for hyperactivity and depressed mood. The patient is not nervous/anxious.        PHYSICAL EXAMINATION:   VITAL SIGNS:   Vitals:    10/06/20 1203   BP: 137/78   Pulse: 73   Resp: 18   Temp: 97.9 °F (36.6 °C)   SpO2: 96%   Weight: 59 kg (130 lb)       Physical Exam   Constitutional: Vital signs are normal. She appears well-developed and well-nourished.   HENT:   Head: Normocephalic.   Right Ear: External ear normal. Decreased hearing is noted.   Left Ear: External ear normal. Decreased hearing is noted.   Nose: Nose normal.   Eyes: Conjunctivae are normal.   Neck: Normal range of motion.   Cardiovascular: Normal rate, regular rhythm and normal heart sounds.   Pulmonary/Chest: Effort normal. No stridor. No respiratory distress. She has  decreased breath sounds. She has no wheezes. She has no rhonchi. She has no rales. She exhibits no tenderness.        Abdominal: Soft. Bowel sounds are normal. She exhibits no distension and no mass. There is no abdominal tenderness. No hernia.   Musculoskeletal:      Comments: Generalized weakness. Confined to bed or WC.   Neurological: She is alert.   Skin: Skin is warm and dry. No lesion and no rash noted.   Psychiatric: Her speech is normal and behavior is normal. Cognition and memory are impaired.   Confused conversation   Nursing note and vitals reviewed.        RECORDS REVIEW:   I have reviewed and interpreted the most recent lab test results          ASSESSMENT     Hearing Impairment    Impaired Mobility      PLAN    Hearing impairment  -Patient without cerumen buildup or impaction and does not have any noticeable increased hearing loss.  Will monitor.     staff to continue supportive care for all ADLs. S  Staff encouraged to keep me informed of any acute changes,  or any new concerning symptoms.    [x]  Discussed Patient in detail with nursing/staff, addressed all needs today.     [x]  Plan of Care Reviewed   []  PT/OT Reviewed   []  Order Changes  []  Discharge Plans Reviewed   [x]  Advance Directive on file with Nursing Home.   [x]  POA on file with Nursing Home.   [x]  Code Status listed: []  Full Code   [x]  DNR       “I confirm accuracy of unchanged data/findings which have been carried forward from previous visit, as well as I have updated appropriately those that have changed.”

## 2020-11-03 NOTE — PROGRESS NOTES
Nursing Home Follow Up Note      Jose D Pennington DO []   ANDREW Cherry [x]  [address] [place] [address]  Phone: [phone]  Fax: [phone] Campbell Larios MD []    Michele Kearney DO []   [address] [place] [address]  Phone: [phone]  Fax: [phone]     PATIENT NAME: Brigette Ramirez                                                                          YOB: 1930           DATE OF SERVICE: 11/2/2020  FACILITY:  [][facility]   [] [facility]   [x] [facility]   [] [facility]   [] Other ______________________________________________________________________      CHIEF COMPLAINT:    Hyperglycemia    HISTORY OF PRESENT ILLNESS:     Patient with intermittent hyperglycemia the past week. On 10/30, blood sugar was 458 and blood sugar on 11/1 was 506. Blood sugar was 293 earlier today, but no other episodes of hyperglycemia, or hypoglycemia. Patient resting comfortably without complaints today.     PAST MEDICAL & SURGICAL HISTORY:   Diabetes  No past surgical history on file.      MEDICATIONS:  I have reviewed and reconciled the patients medication list in the patients chart at the skilled nursing facility today.      ALLERGIES:    Not on File      SOCIAL HISTORY:    Social History     Socioeconomic History   • Marital status:      Spouse name: Not on file   • Number of children: Not on file   • Years of education: Not on file   • Highest education level: Not on file   Tobacco Use   • Smoking status: Former Smoker   • Smokeless tobacco: Never Used       FAMILY HISTORY:    No family history on file.    REVIEW OF SYSTEMS:    Review of Systems   Reason unable to perform ROS: ROS per nursing and patient.   Constitutional: Negative for activity change, appetite change, chills, diaphoresis, fatigue, fever, unexpected weight gain and unexpected weight loss.   HENT: Positive for hearing loss. Negative for congestion, ear discharge, ear pain, mouth sores, nosebleeds, postnasal  drip, rhinorrhea, sinus pressure, sneezing, sore throat, swollen glands and trouble swallowing.    Eyes: Positive for visual disturbance. Negative for pain, discharge, redness and itching.   Respiratory: Negative for cough, choking, chest tightness, shortness of breath, wheezing and stridor.    Cardiovascular: Negative for chest pain, palpitations and leg swelling.   Gastrointestinal: Negative for abdominal distention, abdominal pain, blood in stool, constipation, diarrhea, nausea, vomiting, GERD and indigestion.   Endocrine: Negative for polydipsia and polyuria.   Genitourinary: Positive for urinary incontinence. Negative for decreased urine volume, difficulty urinating, dysuria, flank pain, frequency, hematuria and urgency.   Musculoskeletal: Positive for gait problem. Negative for arthralgias, back pain, joint swelling and myalgias.        Confined to bed or wheelchair, non ambulatory   Skin: Negative for color change, dry skin, rash and skin lesions.   Allergic/Immunologic: Negative for environmental allergies.   Neurological: Positive for weakness, memory problem and confusion. Negative for dizziness, seizures, syncope and speech difficulty.   Psychiatric/Behavioral: Negative for behavioral problems, dysphoric mood, hallucinations, sleep disturbance, negative for hyperactivity and depressed mood. The patient is not nervous/anxious.        PHYSICAL EXAMINATION:   VITAL SIGNS:   Vitals:    11/02/20 1538   BP: 117/63   Pulse: 65   Resp: 18   Temp: 97.9 °F (36.6 °C)   SpO2: 97%   Weight: 47.6 kg (105 lb)       Physical Exam   Constitutional: Vital signs are normal. She appears well-developed and well-nourished.   HENT:   Head: Normocephalic.   Right Ear: External ear normal. Decreased hearing is noted.   Left Ear: External ear normal. Decreased hearing is noted.   Nose: Nose normal.   Eyes: Conjunctivae are normal.   Neck: Normal range of motion.   Cardiovascular: Normal rate, regular rhythm and normal heart  sounds.   Pulmonary/Chest: Effort normal. No stridor. No respiratory distress. She has decreased breath sounds. She has no wheezes. She has no rhonchi. She has no rales. She exhibits no tenderness.        Abdominal: Soft. Bowel sounds are normal. She exhibits no distension and no mass. There is no abdominal tenderness. No hernia.   Musculoskeletal:      Comments: Generalized weakness. Confined to bed or WC.   Neurological: She is alert.   Skin: Skin is warm and dry. No lesion and no rash noted.   Psychiatric: Her speech is normal and behavior is normal. Cognition and memory are impaired.   Confused conversation   Nursing note and vitals reviewed.        RECORDS REVIEW:   I have reviewed and interpreted the most recent lab test results          ASSESSMENT     DM 2 with hyperglycemia    Dementia    Impaired Mobiity    PLAN    DM 2 with hyperglycemia  -Patient with intermittent episodes of hyperglycemia the past several days. Patient denies dysuria, and has no other complaints. Get CBC and BMP in am. Will follow up with results and continue to monitor.     Staff to continue supportive care for all ADLs.       [x]  Discussed Patient in detail with nursing/staff, addressed all needs today.     [x]  Plan of Care Reviewed   []  PT/OT Reviewed   []  Order Changes  []  Discharge Plans Reviewed   [x]  Advance Directive on file with Nursing Home.   [x]  POA on file with Nursing Home.   [x]  Code Status listed: []  Full Code   [x]  DNR       “I confirm accuracy of unchanged data/findings which have been carried forward from previous visit, as well as I have updated appropriately those that have changed.”

## 2020-11-06 NOTE — PROGRESS NOTES
Nursing Home Follow Up Note      Jose D Pennington DO []   ANDREW Cherry [x]  [address] [place] [address]  Phone: [phone]  Fax: [phone] Campbell Larios MD []    Michele Kearney DO []   [address] [place] [address]  Phone: [phone]  Fax: [phone]     PATIENT NAME: Brigette Ramirez                                                                          YOB: 1930           DATE OF SERVICE: 11/4/2020  FACILITY:  [][facility]   [] [facility]   [x] [facility]   [] [facility]   [] Other ______________________________________________________________________      CHIEF COMPLAINT:    Follow up Hyperglycemia    HISTORY OF PRESENT ILLNESS:     Patient with intermittent episodes of hyperglycemia the prior week so labs obtained.  CBC and BMP were normal and at baseline for patient.  Per nursing and EMR her blood sugars have been normal the past several days.  They feel her blood sugars have been elevated because family members are allowed to bring foods in to her now and even have compassionate visits to feed her, so they feel she is eating more.  She is resting comfortably with no signs and symptoms of discomfort, and has no complaints.    PAST MEDICAL & SURGICAL HISTORY:   Diabetes  History reviewed. No pertinent surgical history.      MEDICATIONS:  I have reviewed and reconciled the patients medication list in the patients chart at the skilled nursing facility today.      ALLERGIES:    Not on File      SOCIAL HISTORY:    Social History     Socioeconomic History   • Marital status:      Spouse name: Not on file   • Number of children: Not on file   • Years of education: Not on file   • Highest education level: Not on file   Tobacco Use   • Smoking status: Former Smoker   • Smokeless tobacco: Never Used       FAMILY HISTORY:    History reviewed. No pertinent family history.    REVIEW OF SYSTEMS:    Review of Systems   Reason unable to perform ROS: ROS per nursing and  patient.   Constitutional: Negative for activity change, appetite change, chills, diaphoresis, fatigue, fever, unexpected weight gain and unexpected weight loss.   HENT: Positive for hearing loss. Negative for congestion, ear discharge, ear pain, mouth sores, nosebleeds, postnasal drip, rhinorrhea, sinus pressure, sneezing, sore throat, swollen glands and trouble swallowing.    Eyes: Positive for visual disturbance. Negative for pain, discharge, redness and itching.   Respiratory: Negative for cough, choking, chest tightness, shortness of breath, wheezing and stridor.    Cardiovascular: Negative for chest pain, palpitations and leg swelling.   Gastrointestinal: Negative for abdominal distention, abdominal pain, blood in stool, constipation, diarrhea, nausea, vomiting, GERD and indigestion.   Endocrine: Negative for polydipsia and polyuria.   Genitourinary: Positive for urinary incontinence. Negative for decreased urine volume, difficulty urinating, dysuria, flank pain, frequency, hematuria and urgency.   Musculoskeletal: Positive for gait problem. Negative for arthralgias, back pain, joint swelling and myalgias.        Confined to bed or wheelchair, non ambulatory   Skin: Negative for color change, dry skin, rash and skin lesions.   Allergic/Immunologic: Negative for environmental allergies.   Neurological: Positive for weakness, memory problem and confusion. Negative for dizziness, seizures, syncope and speech difficulty.   Psychiatric/Behavioral: Negative for behavioral problems, dysphoric mood, hallucinations, sleep disturbance, negative for hyperactivity and depressed mood. The patient is not nervous/anxious.        PHYSICAL EXAMINATION:   VITAL SIGNS:   Vitals:    11/05/20 0839   BP: 136/74   Pulse: 72   Resp: 18   Temp: 98 °F (36.7 °C)   SpO2: 97%   Weight: 49.4 kg (109 lb)       Physical Exam   Constitutional: Vital signs are normal. She appears well-developed and well-nourished.   HENT:   Head:  Normocephalic.   Right Ear: External ear normal. Decreased hearing is noted.   Left Ear: External ear normal. Decreased hearing is noted.   Nose: Nose normal.   Eyes: Conjunctivae are normal.   Neck: Normal range of motion.   Cardiovascular: Normal rate, regular rhythm and normal heart sounds.   Pulmonary/Chest: Effort normal. No stridor. No respiratory distress. She has decreased breath sounds. She has no wheezes. She has no rhonchi. She has no rales. She exhibits no tenderness.        Abdominal: Soft. Bowel sounds are normal. She exhibits no distension and no mass. There is no abdominal tenderness. No hernia.   Musculoskeletal:      Comments: Generalized weakness. Confined to bed or WC.   Neurological: She is alert.   Skin: Skin is warm and dry. No lesion and no rash noted.   Psychiatric: Her speech is normal and behavior is normal. Cognition and memory are impaired.   Confused conversation   Nursing note and vitals reviewed.        RECORDS REVIEW:   I have reviewed and interpreted the most recent lab test results          ASSESSMENT     DM 2 with hyperglycemia    Dementia    Impaired Mobiity    PLAN    DM 2 with hyperglycemia  -Patient with intermittent episodes of hyperglycemia so labs obtained.  BMP and CBC insignificant and blood sugars are much improved the past several days.  Hyperglycemic episodes or most likely due to her recent change in diet with family bringing in outside foods.  Will continue to monitor.    Staff to continue supportive care for all ADLs.       [x]  Discussed Patient in detail with nursing/staff, addressed all needs today.     [x]  Plan of Care Reviewed   []  PT/OT Reviewed   []  Order Changes  []  Discharge Plans Reviewed   [x]  Advance Directive on file with Nursing Home.   [x]  POA on file with Nursing Home.   [x]  Code Status listed: []  Full Code   [x]  DNR       “I confirm accuracy of unchanged data/findings which have been carried forward from previous visit, as well as I have  updated appropriately those that have changed.”

## 2020-11-18 NOTE — PROGRESS NOTES
Nursing Home Progress Note        Jose D Pennington DO [x]  ANDREW Cherry []  [address] [place] [address]  Phone: [phone]  Fax: [phone] Campbell Larios MD []  Michele Kearney DO []  [address] [place] [address]  Phone: [phone]  Fax: [phone]     PATIENT NAME: Brigette Ramirez                                                                          YOB: 1930           DATE OF SERVICE: 11/18/2020  FACILITY: []  [facility]  [] [facility]  [x]  [facility]  [] [facility]  []  Other ______________________________________________________________________     CHIEF COMPLAINT:  Impaired mobility and ADLs/chronic respiratory failure with hypoxia/vascular dementia/type 2 diabetes mellitus treated with insulin/hypertension/long-term care needs      HISTORY OF PRESENT ILLNESS:   [x]  Follow Up visit for coordination of long term care issues and chronic medical management of Diagnoses and all orders for this visit:    1. Impaired mobility and ADLs (Primary)    2. Age-related physical debility    3. Essential hypertension    4. Chronic respiratory failure with hypoxia (CMS/HCC)    5. Chronic obstructive bronchitis (CMS/HCC)    6. GERD without esophagitis    7. Type 2 diabetes mellitus treated with insulin (CMS/HCC)    8. Vascular dementia without behavioral disturbance (CMS/HCC)    9. Anemia of chronic disease with mixed iron deficiency          PAST MEDICAL & SURGICAL HISTORY:   No past medical history on file.   No past surgical history on file.      MEDICATIONS:  I have reviewed and reconciled the patients medication list in the patients chart at the skilled nursing facility today.      ALLERGIES:    Not on File      SOCIAL HISTORY:    Social History     Socioeconomic History   • Marital status:      Spouse name: Not on file   • Number of children: Not on file   • Years of education: Not on file   • Highest education level: Not on file   Tobacco Use   • Smoking  status: Former Smoker   • Smokeless tobacco: Never Used       FAMILY HISTORY:    No family history on file.    REVIEW OF SYSTEMS:  Patient is a poor historian, review of systems obtained on direct discussion with patient's treating nurse, as well as staff and review of records.  Review of Systems  · Appetite: Fair [x]   Good []   Poor []   Weight Loss []  [x]  Weight Stable   Unavoidable Weight Loss []  Tolerating Tube Feeding []    Supplements Provided []   · Constitutional: Negative for fever, chills, diaphoresis or fatigue and weight change.     PHYSICAL EXAMINATION:   VITAL SIGNS:   There were no vitals filed for this visit.    Physical Exam    General Appearance:  [x]  Alert   [x]  Oriented x person  [x]  No acute distress     [x]  Confused at baseline []  Disoriented   []  Comatose   Head:  Atraumatic and normocephalic, without obvious abnormality.   Eyes:         PERRLA, conjunctivae and sclerae normal, no Icterus. No pallor. Extra-occular movements are within normal limits.   Ears:  Ears appear intact with no abnormalities noted.  Severely decreased hearing bilaterally   Throat: No oral lesions, no thrush, oral mucosa moist.   Neck: Supple, trachea midline, no thyromegaly, no carotid bruit.   Back:   No kyphoscoliosis. No tenderness to palpation.   Lungs:   Chest shape is normal. Breath sounds heard bilaterally equally.  No wheezing.    Long expiratory phase bilaterally.  Rhonchus referred congestive changes throughout all lung fields, partially cleared with heavy cough.   Heart:  Normal S1 and S2, no murmur, no gallop, no rub. No JVD.   Abdomen:   Normal bowel sounds, no masses, no organomegaly. Soft, non-tender, non-distended, no guarding    Extremities: Moves all extremities at times, without edema, cyanosis or clubbing.    Thin, frail build.   Poor core strength and stability.   Pulses: Pulses palpable and equal bilaterally.   Skin: No bleeding or rash.  Generalized dry skin noted.  Age-related atrophy  of skin.   Neurologic: [x] Normal speech []  Normal mental status    [x] Cranial nerves II through XII intact, severely limited hearing capacity.  [x]  No anosmia [x]  DTR 2+ [x]  Proprioception intact  [x]  Age related motor/sensory deficits.      Psych/Mood:                    [x]  No acute changes []  Depressed  Urinary:                            []  Continent  [x]  Incontinent []  Retention  []  F/C      []  UTI w/treatment in progress         ASSESSMENT     Diagnoses and all orders for this visit:    1. Impaired mobility and ADLs (Primary)    2. Age-related physical debility    3. Essential hypertension    4. Chronic respiratory failure with hypoxia (CMS/HCC)    5. Chronic obstructive bronchitis (CMS/HCC)    6. GERD without esophagitis    7. Type 2 diabetes mellitus treated with insulin (CMS/HCC)    8. Vascular dementia without behavioral disturbance (CMS/HCC)    9. Anemia of chronic disease with mixed iron deficiency          PLAN      [x]  Discussed Patient in detail with nursing/staff, addressed all needs today.     [x]  Plan of Care Reviewed   []  PT/OT Reviewed   []  Order Changes  []  Discharge Plans Reviewed   []  Code Status Changes    I have reviewed and updated all copied forward information, as appropriate.  I attest to the accuracy and relevance of any unchanged information.       Jose D Pennington DO  11/18/2020

## 2020-12-07 NOTE — PROGRESS NOTES
Nursing Home Follow Up Note      Jose D Pennington DO []   ANDREW Cherry [x]  [address] [place] [address]  Phone: [phone]  Fax: [phone] Campbell Larios MD []    Michele Kearney DO []   [address] [place] [address]  Phone: [phone]  Fax: [phone]     PATIENT NAME: Brigette Ramirez                                                                          YOB: 1930           DATE OF SERVICE: 12/4/2020  FACILITY:  [][facility]   [] [place]   [x] [facility]   [] [facility]   [] Other ______________________________________________________________________      CHIEF COMPLAINT:    Dysuria    HISTORY OF PRESENT ILLNESS:     Visit performed via video today due to increased risk for Covid exposure.  Per nursing, patient has had complaints of dysuria today, per her family.  They report that during her visit today they thought she was more confused and she told them it burned when she urinated.  Patient has not reported that to nursing and does not report that during visit at this time.    PAST MEDICAL & SURGICAL HISTORY:   Diabetes  History reviewed. No pertinent surgical history.      MEDICATIONS:  I have reviewed and reconciled the patients medication list in the patients chart at the skilled nursing facility today.      ALLERGIES:    Not on File      SOCIAL HISTORY:    Social History     Socioeconomic History   • Marital status:      Spouse name: Not on file   • Number of children: Not on file   • Years of education: Not on file   • Highest education level: Not on file   Tobacco Use   • Smoking status: Former Smoker   • Smokeless tobacco: Never Used       FAMILY HISTORY:    History reviewed. No pertinent family history.    REVIEW OF SYSTEMS:    Review of Systems   Reason unable to perform ROS: ROS per nursing and patient.   Constitutional: Negative for activity change, appetite change, chills, diaphoresis, fatigue, fever, unexpected weight gain and unexpected  weight loss.   HENT: Positive for hearing loss. Negative for congestion, ear discharge, ear pain, mouth sores, nosebleeds, postnasal drip, rhinorrhea, sinus pressure, sneezing, sore throat, swollen glands and trouble swallowing.    Eyes: Positive for visual disturbance. Negative for pain, discharge, redness and itching.   Respiratory: Negative for cough, choking, chest tightness, shortness of breath, wheezing and stridor.    Cardiovascular: Negative for chest pain, palpitations and leg swelling.   Gastrointestinal: Negative for abdominal distention, abdominal pain, blood in stool, constipation, diarrhea, nausea, vomiting, GERD and indigestion.   Endocrine: Negative for polydipsia and polyuria.   Genitourinary: Positive for dysuria and urinary incontinence. Negative for decreased urine volume, difficulty urinating, flank pain, frequency, hematuria and urgency.   Musculoskeletal: Positive for gait problem. Negative for arthralgias, back pain, joint swelling and myalgias.        Confined to bed or wheelchair, non ambulatory   Skin: Negative for color change, dry skin, rash and skin lesions.   Allergic/Immunologic: Negative for environmental allergies.   Neurological: Positive for weakness, memory problem and confusion. Negative for dizziness, seizures, syncope and speech difficulty.   Psychiatric/Behavioral: Negative for behavioral problems, dysphoric mood, hallucinations, sleep disturbance, negative for hyperactivity and depressed mood. The patient is not nervous/anxious.        PHYSICAL EXAMINATION:   VITAL SIGNS:   Vitals:    12/04/20 1445   BP: 120/62   Pulse: 67   Resp: 20   Temp: 97.7 °F (36.5 °C)   SpO2: 96%   Weight: 49 kg (108 lb)       Physical Exam   Constitutional: Vital signs are normal. She appears well-developed and well-nourished.   HENT:   Head: Normocephalic.   Right Ear: External ear normal. Decreased hearing is noted.   Left Ear: External ear normal. Decreased hearing is noted.   Nose: Nose normal.    Eyes: Conjunctivae are normal.   Neck: Normal range of motion.   Cardiovascular: Normal rate.   Pulmonary/Chest: Effort normal. No respiratory distress. She has decreased breath sounds.        Abdominal: She exhibits no distension.   Musculoskeletal:      Comments: Generalized weakness. Confined to bed or WC.   Neurological: She is alert.   Skin: Skin is dry. No rash noted.   Psychiatric: Her speech is normal and behavior is normal. Cognition and memory are impaired.   Confused conversation   Nursing note and vitals reviewed.        RECORDS REVIEW:   I have reviewed and interpreted the most recent lab test results          ASSESSMENT     Dysuria    Dementia    Impaired Mobiity    PLAN    Dysuria  -Patient with complaints of dysuria earlier today with her family.  Collect UA with culture for further evaluation.  Will follow up with results and continue to monitor    Dementia  -No increased confusion or behavior changes at this time noted.  Will continue to monitor    Staff to continue supportive care for all ADLs.       [x]  Discussed Patient in detail with nursing/staff, addressed all needs today.     [x]  Plan of Care Reviewed   []  PT/OT Reviewed   []  Order Changes  []  Discharge Plans Reviewed   [x]  Advance Directive on file with Nursing Home.   [x]  POA on file with Nursing Home.   [x]  Code Status listed: []  Full Code   [x]  DNR       “I confirm accuracy of unchanged data/findings which have been carried forward from previous visit, as well as I have updated appropriately those that have changed.”

## 2024-12-09 NOTE — PROGRESS NOTES
Nursing Home Progress Note        Jose D Pennington DO [x]  ANDREW Cherry []  [address] [place] [address]  Phone: [phone]  Fax: [phone] Campbell Larios MD []  Michele Kearney DO []  [address] [place] [address]  Phone: [phone]  Fax: [phone]     PATIENT NAME: Brigette Ramirez                                                                          YOB: 1930           DATE OF SERVICE: 6/17/2020  FACILITY: []  [place]  [] [facility]  [x]  [facility]  [] [facility]  []  Other ______________________________________________________________________     CHIEF COMPLAINT:  Impaired mobility and ADLs/chronic respiratory failure with hypoxia/vascular dementia/type 2 diabetes mellitus treated with insulin/hypertension/long-term care needs      HISTORY OF PRESENT ILLNESS:   [x]  Follow Up visit for coordination of long term care issues and chronic medical management of Diagnoses and all orders for this visit:    Impaired mobility and ADLs    Age-related physical debility    Essential hypertension    Type 2 diabetes mellitus treated with insulin (CMS/[facility])    Vascular dementia without behavioral disturbance (CMS/[facility])    Anemia of chronic disease with mixed iron deficiency    GERD without esophagitis    Chronic respiratory failure with hypoxia (CMS/[facility])    Nursing/staff reports that patient continues to be responsive and receptive to board of care with respect to mobilization and transfers, as well as ADL assistance.  Nutritionally she is at baseline, without demonstratable clinical change.    No reported falls or injuries.    Vital signs have demonstrated hemodynamic stability.    No acute behavioral changes, sleeps well.    Demonstrates no findings of excessive blood loss, including epistaxis, hemoptysis, hematuria or BRB/BTS.    No reports of any focal aspiration.    Continues to utilize oxygen supplementation, has not required any increase.      PAST MEDICAL & SURGICAL  HISTORY:   No past medical history on file.   No past surgical history on file.      MEDICATIONS:  I have reviewed and reconciled the patients medication list in the patients chart at the skilled nursing facility today.      ALLERGIES:    Allergies not on file      SOCIAL HISTORY:    Social History     Socioeconomic History   • Marital status:      Spouse name: Not on file   • Number of children: Not on file   • Years of education: Not on file   • Highest education level: Not on file   Tobacco Use   • Smoking status: Former Smoker   • Smokeless tobacco: Never Used       FAMILY HISTORY:    No family history on file.    REVIEW OF SYSTEMS:  Patient is a poor historian, review of systems obtained on direct discussion with patient's treating nurse, as well as staff and review of records.  Review of Systems  · Appetite: Fair [x]   Good []   Poor []   Weight Loss []  [x]  Weight Stable   Unavoidable Weight Loss []  Tolerating Tube Feeding []    Supplements Provided []   · Constitutional: Negative for fever, chills, diaphoresis or fatigue and weight change.     PHYSICAL EXAMINATION:   VITAL SIGNS:   Vitals:    06/18/20 0826   BP: 118/59   Pulse: 61   Resp: 20   Temp: 98 °F (36.7 °C)   SpO2: 94%       Physical Exam    General Appearance:  [x]  Alert   [x]  Oriented x person  [x]  No acute distress     [x]  Confused at baseline []  Disoriented   []  Comatose   Head:  Atraumatic and normocephalic, without obvious abnormality.   Eyes:         PERRLA, conjunctivae and sclerae normal, no Icterus. No pallor. Extra-occular movements are within normal limits.   Ears:  Ears appear intact with no abnormalities noted.  Severely decreased hearing bilaterally   Throat: No oral lesions, no thrush, oral mucosa moist.   Neck: Supple, trachea midline, no thyromegaly, no carotid bruit.   Back:   No kyphoscoliosis. No tenderness to palpation.   Lungs:   Chest shape is normal. Breath sounds heard bilaterally equally.  No wheezing.     Long expiratory phase bilaterally.  Rhonchus referred congestive changes throughout all lung fields, partially cleared with heavy cough.   Heart:  Normal S1 and S2, no murmur, no gallop, no rub. No JVD.   Abdomen:   Normal bowel sounds, no masses, no organomegaly. Soft, non-tender, non-distended, no guarding    Extremities: Moves all extremities at times, without edema, cyanosis or clubbing.    Thin, frail build.   Poor core strength and stability.   Pulses: Pulses palpable and equal bilaterally.   Skin: No bleeding or rash.  Generalized dry skin noted.  Age-related atrophy of skin.   Neurologic: [x] Normal speech []  Normal mental status    [x] Cranial nerves II through XII intact, severely limited hearing capacity.  [x]  No anosmia [x]  DTR 2+ [x]  Proprioception intact  [x]  Age related motor/sensory deficits.      Psych/Mood:                    [x]  No acute changes []  Depressed  Urinary:                            []  Continent  [x]  Incontinent []  Retention  []  F/C      []  UTI w/treatment in progress         ASSESSMENT     Diagnoses and all orders for this visit:    1. Impaired mobility and ADLs (Primary)    2. Age-related physical debility    3. Essential hypertension    4. Type 2 diabetes mellitus treated with insulin (CMS/[facility])    5. Vascular dementia without behavioral disturbance (CMS/[facility])    6. Anemia of chronic disease with mixed iron deficiency    7. GERD without esophagitis    8. Chronic respiratory failure with hypoxia (CMS/HCC)          PLAN  Continue current supportive care for mobilization and transfers, as well as ADL assistance.  And follow her nutritional intake, maintain appropriate hydration status as best able.    Vital signs do demonstrate hemodynamic stability, blood pressure is at goal.    She has no acute behavioral changes at this time, continues to be very hard of hearing.  She does continue to decline, expectedly, as a result of her advanced age and numerous chronic comorbid  conditions.    Continue surveillance labs, noted iron deficiency in the past of multiple etiologies.    Continue aspiration precautions as well as dietary assistance as able.    Continue oxygen supplementation and other respiratory care.    Surveillance labs when needed.    [x]  Discussed Patient in detail with nursing/staff, addressed all needs today.     [x]  Plan of Care Reviewed   []  PT/OT Reviewed   []  Order Changes  []  Discharge Plans Reviewed   []  Code Status Changes    I have reviewed and updated all copied forward information, as appropriate.  I attest to the accuracy and relevance of any unchanged information.       Jose D Pennington DO  6/17/2020           Past 24 hrs